# Patient Record
Sex: FEMALE | Race: WHITE | NOT HISPANIC OR LATINO | URBAN - METROPOLITAN AREA
[De-identification: names, ages, dates, MRNs, and addresses within clinical notes are randomized per-mention and may not be internally consistent; named-entity substitution may affect disease eponyms.]

---

## 2017-10-03 ENCOUNTER — APPOINTMENT (OUTPATIENT)
Dept: URBAN - METROPOLITAN AREA CLINIC 200 | Age: 57
Setting detail: DERMATOLOGY
End: 2017-10-04

## 2017-10-03 DIAGNOSIS — L28.0 LICHEN SIMPLEX CHRONICUS: ICD-10-CM

## 2017-10-03 PROCEDURE — OTHER COUNSELING: OTHER

## 2017-10-03 PROCEDURE — OTHER PRESCRIPTION: OTHER

## 2017-10-03 PROCEDURE — 99212 OFFICE O/P EST SF 10 MIN: CPT

## 2017-10-03 RX ORDER — BETAMETHASONE DIPROPIONATE 0.5 MG/G
CREAM TOPICAL
Qty: 1 | Refills: 4 | Status: ERX

## 2017-10-03 ASSESSMENT — LOCATION DETAILED DESCRIPTION DERM: LOCATION DETAILED: RIGHT DISTAL DORSAL FOREARM

## 2017-10-03 ASSESSMENT — LOCATION SIMPLE DESCRIPTION DERM: LOCATION SIMPLE: RIGHT FOREARM

## 2017-10-03 ASSESSMENT — LOCATION ZONE DERM: LOCATION ZONE: ARM

## 2019-03-24 LAB
ALBUMIN SERPL-MCNC: 4.4 G/DL (ref 3.5–5.5)
ALBUMIN/GLOB SERPL: 1.5 {RATIO} (ref 1.2–2.2)
ALP SERPL-CCNC: 76 IU/L (ref 39–117)
ALT SERPL-CCNC: 20 IU/L (ref 0–32)
AST SERPL-CCNC: 22 IU/L (ref 0–40)
BASOPHILS # BLD AUTO: 0 X10E3/UL (ref 0–0.2)
BASOPHILS NFR BLD AUTO: 0 %
BILIRUB SERPL-MCNC: 0.3 MG/DL (ref 0–1.2)
BUN SERPL-MCNC: 11 MG/DL (ref 6–24)
BUN/CREAT SERPL: 16 (ref 9–23)
CALCIUM SERPL-MCNC: 9.7 MG/DL (ref 8.7–10.2)
CHLORIDE SERPL-SCNC: 97 MMOL/L (ref 96–106)
CO2 SERPL-SCNC: 28 MMOL/L (ref 20–29)
CREAT SERPL-MCNC: 0.68 MG/DL (ref 0.57–1)
EOSINOPHIL # BLD AUTO: 0.3 X10E3/UL (ref 0–0.4)
EOSINOPHIL NFR BLD AUTO: 2 %
ERYTHROCYTE [DISTWIDTH] IN BLOOD BY AUTOMATED COUNT: 14.6 % (ref 12.3–15.4)
GLOBULIN SER CALC-MCNC: 3 G/DL (ref 1.5–4.5)
GLUCOSE SERPL-MCNC: 145 MG/DL (ref 65–99)
HCT VFR BLD AUTO: 39 % (ref 34–46.6)
HGB BLD-MCNC: 13.5 G/DL (ref 11.1–15.9)
IMM GRANULOCYTES # BLD AUTO: 0 X10E3/UL (ref 0–0.1)
IMM GRANULOCYTES NFR BLD AUTO: 0 %
LAB CORP EGFR IF AFRICN AM: 112 ML/MIN/1.73
LAB CORP EGFR IF NONAFRICN AM: 97 ML/MIN/1.73
LYMPHOCYTES # BLD AUTO: 2.6 X10E3/UL (ref 0.7–3.1)
LYMPHOCYTES NFR BLD AUTO: 22 %
MCH RBC QN AUTO: 27.9 PG (ref 26.6–33)
MCHC RBC AUTO-ENTMCNC: 34.6 G/DL (ref 31.5–35.7)
MCV RBC AUTO: 81 FL (ref 79–97)
MONOCYTES # BLD AUTO: 0.8 X10E3/UL (ref 0.1–0.9)
MONOCYTES NFR BLD AUTO: 7 %
NEUTROPHILS # BLD AUTO: 8 X10E3/UL (ref 1.4–7)
NEUTROPHILS NFR BLD AUTO: 69 %
PLATELET # BLD AUTO: 327 X10E3/UL (ref 150–379)
POTASSIUM SERPL-SCNC: 4.2 MMOL/L (ref 3.5–5.2)
PROT SERPL-MCNC: 7.4 G/DL (ref 6–8.5)
RBC # BLD AUTO: 4.84 X10E6/UL (ref 3.77–5.28)
SODIUM SERPL-SCNC: 140 MMOL/L (ref 134–144)
WBC # BLD AUTO: 11.6 X10E3/UL (ref 3.4–10.8)

## 2019-04-02 ENCOUNTER — APPOINTMENT (OUTPATIENT)
Dept: URBAN - METROPOLITAN AREA CLINIC 200 | Age: 59
Setting detail: DERMATOLOGY
End: 2019-04-09

## 2019-04-02 DIAGNOSIS — C88.8 OTHER MALIGNANT IMMUNOPROLIFERATIVE DISEASES: ICD-10-CM

## 2019-04-02 PROCEDURE — OTHER COUNSELING: OTHER

## 2019-04-02 PROCEDURE — 99212 OFFICE O/P EST SF 10 MIN: CPT

## 2019-04-02 PROCEDURE — OTHER MIPS QUALITY: OTHER

## 2019-04-02 ASSESSMENT — LOCATION DETAILED DESCRIPTION DERM
LOCATION DETAILED: RIGHT SUPERIOR FOREHEAD
LOCATION DETAILED: RIGHT SUPERIOR FOREHEAD

## 2019-04-02 ASSESSMENT — LOCATION SIMPLE DESCRIPTION DERM
LOCATION SIMPLE: RIGHT FOREHEAD
LOCATION SIMPLE: RIGHT FOREHEAD

## 2019-04-02 ASSESSMENT — LOCATION ZONE DERM
LOCATION ZONE: FACE
LOCATION ZONE: FACE

## 2019-04-02 NOTE — PROCEDURE: MIPS QUALITY
Quality 474: Zoster Vaccination Status: Shingrix Vaccination not Administered or Previously Received, Reason not Otherwise Specified
Detail Level: Detailed
Quality 110: Preventive Care And Screening: Influenza Immunization: Influenza Immunization not Administered because Patient Refused.

## 2019-04-02 NOTE — HPI: COUNSELING (TEST RESULT)
Additional History: Patient had a biopsy done at [facility]. Is here today for a second opinion.

## 2019-04-03 ENCOUNTER — OFFICE VISIT (OUTPATIENT)
Dept: PRIMARY CARE | Facility: CLINIC | Age: 59
End: 2019-04-03
Payer: COMMERCIAL

## 2019-04-03 VITALS
SYSTOLIC BLOOD PRESSURE: 130 MMHG | BODY MASS INDEX: 46.77 KG/M2 | DIASTOLIC BLOOD PRESSURE: 80 MMHG | OXYGEN SATURATION: 97 % | WEIGHT: 291 LBS | HEART RATE: 100 BPM | HEIGHT: 66 IN

## 2019-04-03 DIAGNOSIS — R73.01 IMPAIRED FASTING GLUCOSE: ICD-10-CM

## 2019-04-03 DIAGNOSIS — D72.820 LYMPHOCYTOSIS: ICD-10-CM

## 2019-04-03 DIAGNOSIS — E66.01 OBESITY, CLASS III, BMI 40-49.9 (MORBID OBESITY) (CMS/HCC): ICD-10-CM

## 2019-04-03 DIAGNOSIS — I10 ESSENTIAL HYPERTENSION, BENIGN: Primary | ICD-10-CM

## 2019-04-03 DIAGNOSIS — C85.80 MARGINAL ZONE LYMPHOMA (CMS/HCC): ICD-10-CM

## 2019-04-03 PROBLEM — E66.813 OBESITY, CLASS III, BMI 40-49.9 (MORBID OBESITY): Status: ACTIVE | Noted: 2019-04-03

## 2019-04-03 PROBLEM — K76.0 FATTY LIVER: Status: ACTIVE | Noted: 2019-04-03

## 2019-04-03 PROCEDURE — 99214 OFFICE O/P EST MOD 30 MIN: CPT | Performed by: NURSE PRACTITIONER

## 2019-04-03 RX ORDER — NORETHINDRONE 5 MG/1
TABLET ORAL
Refills: 1 | COMMUNITY
Start: 2019-01-22 | End: 2019-04-03 | Stop reason: ALTCHOICE

## 2019-04-03 RX ORDER — LORAZEPAM 0.5 MG/1
0.5 TABLET ORAL EVERY 6 HOURS PRN
Refills: 0 | COMMUNITY
Start: 2019-03-28 | End: 2020-09-04 | Stop reason: ALTCHOICE

## 2019-04-03 NOTE — PROGRESS NOTES
[facility]  Meghan Cordova  [address]  [place] [address]  Phone: [phone]  Fax: [phone]      Patient ID: Anh Rowan                              : 1960    Visit Date: 4/3/2019    Chief Complaint: Results         Patient ID: Anh Rowan is a 58 y.o. female.    Patient Active Problem List   Diagnosis   • Essential hypertension, benign   • Fatty liver   • Obesity, Class III, BMI 40-49.9 (morbid obesity) (CMS/HCC) (HCC)   • Marginal zone lymphoma (CMS/HCC) (HCC)   • Impaired fasting glucose   • Elevated WBC count         Current Outpatient Prescriptions:   •  LORazepam (ATIVAN) 0.5 mg tablet, Take 0.5 mg by mouth every 6 (six) hours as needed. for anxiety, Disp: , Rfl: 0    Allergies   Allergen Reactions   • Clarithromycin Rash       Social History     Social History   • Marital status:      Spouse name: N/A   • Number of children: N/A   • Years of education: N/A     Occupational History   • Not on file.     Social History Main Topics   • Smoking status: Never Smoker   • Smokeless tobacco: Never Used   • Alcohol use Not on file   • Drug use: Unknown   • Sexual activity: Not on file     Other Topics Concern   • Not on file     Social History Narrative   • No narrative on file       Health Maintenance   Topic Date Due   • Pneumococcal 19-64 Highest Risk (1 of 3 - PCV13) 1980   • Colonoscopy  2011   • Influenza Vaccine (Season Ended) 2019   • Mammogram  07/10/2020   • DTaP, Tdap, and Td Vaccines (2 - Td) 2021   • Cervical Cancer Screening  2023   • HPV Vaccines  Aged Out   • Meningococcal Vaccine  Aged Out   • HIB Vaccines  Aged Out   • IPV Vaccines  Aged Out   • Hepatitis C Screening  Completed       HPI  Here to go over some recent lab results she had with her specialists. New dx of impaired glucose. Also with recent dx of lymphoma on her scalp. She is undergoing workup with oncology and will  "needs more surgery from [facility] as well. She has a PET scan scheduled this week. She has been under a lot of stress with this new dx but is doing ok overall.  Denies recent weight loss, increased thirst, urinary frequency, change in vision.  States she does consume a lot of carbs in her diet. She does not exercise regularly.        The following have been reviewed and updated as appropriate in this visit:  Allergies  Meds  Problems         Review of System  Review of Systems   Constitutional: Negative for activity change, appetite change, chills, fatigue, fever and unexpected weight change.   Respiratory: Negative.    Cardiovascular: Negative.    Endocrine: Negative.        Objective     Vitals  Vitals:    04/03/19 1614   BP: 130/80   Pulse: 100   SpO2: 97%   Weight: 132 kg (291 lb)   Height: 1.67 m (5' 5.75\")     Body mass index is 47.33 kg/m².    Physical Exam  Physical Exam   Constitutional: She is oriented to person, place, and time. She appears well-developed and well-nourished. No distress.   Neck: Neck supple. No JVD present. No thyromegaly present.   Cardiovascular: Normal rate, regular rhythm and normal heart sounds.    No murmur heard.  Pulmonary/Chest: Effort normal and breath sounds normal. No respiratory distress. She has no wheezes. She has no rales.   Musculoskeletal: She exhibits no edema.   Lymphadenopathy:     She has no cervical adenopathy.   Neurological: She is alert and oriented to person, place, and time.   Skin: She is not diaphoretic.   Vitals reviewed.      Assessment/Plan     Problem List Items Addressed This Visit     Essential hypertension, benign - Primary     BP stable today  Continued monitoring suggested.  Weight loss will help.         Obesity, Class III, BMI 40-49.9 (morbid obesity) (CMS/HCC) (HCC)     BMI very elevated.  Encouraged pt to actively work on weight loss and try to get some regular exercise as well.         Marginal zone lymphoma (CMS/HCC) ([facility])     Recent dx--skin " lesion on her scalp.  Seeing Dr Salcedo.  PET scan scheduled for 4/6/2019.  Will need wide excision of area-- schedule with [facility] for early May.         Impaired fasting glucose     Check fasting glucose and A1C with next lab draw for oncology.  Also needs a fasting lipid panel.  Encouraged low sugar/carb diet  Avoid concentrated sweets.         Elevated WBC count     Recent WBC 11.6  Repeat CBC 1 mo  May have been due to stress.             Follow up 3 mo      HAYDER James  4/4/2019

## 2019-04-04 PROBLEM — D72.829 ELEVATED WBC COUNT: Status: ACTIVE | Noted: 2019-04-04

## 2019-04-04 PROBLEM — R73.01 IMPAIRED FASTING GLUCOSE: Status: ACTIVE | Noted: 2019-04-04

## 2019-04-04 ASSESSMENT — ENCOUNTER SYMPTOMS
FEVER: 0
APPETITE CHANGE: 0
CHILLS: 0
UNEXPECTED WEIGHT CHANGE: 0
FATIGUE: 0
CARDIOVASCULAR NEGATIVE: 1
ENDOCRINE NEGATIVE: 1
RESPIRATORY NEGATIVE: 1
ACTIVITY CHANGE: 0

## 2019-04-04 NOTE — ASSESSMENT & PLAN NOTE
BMI very elevated.  Encouraged pt to actively work on weight loss and try to get some regular exercise as well.

## 2019-04-04 NOTE — ASSESSMENT & PLAN NOTE
Check fasting glucose and A1C with next lab draw for oncology.  Also needs a fasting lipid panel.  Encouraged low sugar/carb diet  Avoid concentrated sweets.

## 2019-04-09 LAB
BASOPHILS # BLD AUTO: (no result) 10*3/UL
EOSINOPHIL # BLD AUTO: (no result) 10*3/UL
EOSINOPHIL NFR BLD AUTO: (no result) %
ESTRADIOL SERPL-MCNC: NORMAL PG/ML
FSH SERPL-ACNC: NORMAL MIU/ML
HCT VFR BLD AUTO: (no result) %
HGB BLD-MCNC: (no result) G/DL
LYMPHOCYTES # BLD AUTO: (no result) 10*3/UL
LYMPHOCYTES NFR BLD AUTO: (no result) %
MONOCYTES NFR BLD AUTO: (no result) %
NEUTROPHILS NFR BLD AUTO: (no result) %
PLATELET # BLD AUTO: (no result) 10*3/UL
RBC # BLD AUTO: (no result) 10*6/UL
SPECIMEN STATUS: NORMAL
TSH SERPL DL<=0.005 MIU/L-ACNC: NORMAL UIU/ML (ref 0.45–4.5)
WBC # BLD AUTO: (no result) X10E3/UL (ref 3.4–10.8)

## 2019-05-10 ENCOUNTER — APPOINTMENT (OUTPATIENT)
Dept: URBAN - METROPOLITAN AREA CLINIC 200 | Age: 59
Setting detail: DERMATOLOGY
End: 2019-05-13

## 2019-05-10 DIAGNOSIS — C85.8 OTHER SPECIFIED TYPES OF NON-HODGKIN LYMPHOMA: ICD-10-CM

## 2019-05-10 PROBLEM — C85.80 OTHER SPECIFIED TYPES OF NON-HODGKIN LYMPHOMA, UNSPECIFIED SITE: Status: ACTIVE | Noted: 2019-05-10

## 2019-05-10 PROCEDURE — OTHER BIOPSY BY PUNCH METHOD: OTHER

## 2019-05-10 PROCEDURE — 11104 PUNCH BX SKIN SINGLE LESION: CPT

## 2019-05-10 ASSESSMENT — LOCATION ZONE DERM: LOCATION ZONE: FACE

## 2019-05-10 ASSESSMENT — LOCATION SIMPLE DESCRIPTION DERM: LOCATION SIMPLE: RIGHT FOREHEAD

## 2019-05-10 ASSESSMENT — LOCATION DETAILED DESCRIPTION DERM: LOCATION DETAILED: RIGHT SUPERIOR FOREHEAD

## 2019-05-10 NOTE — PROCEDURE: BIOPSY BY PUNCH METHOD
Billing Type: Third-Party Bill
Hemostasis: None
X Size Of Lesion In Cm (Optional): 0
Anesthesia Type: 1% lidocaine without epinephrine
Biopsy Type: H and E
Anesthesia Volume In Cc (Will Not Render If 0): 1
Consent: Written consent was obtained and risks were reviewed including but not limited to scarring, infection, bleeding, scabbing, incomplete removal, nerve damage and allergy to anesthesia.
Punch Size In Mm: 6
Render In Bullet Format When Appropriate: No
Epidermal Sutures: 4-0 Nylon
Home Suture Removal Text: Patient was provided a home suture removal kit and will remove their sutures at home.  If they have any questions or difficulties they will call the office.
Detail Level: Detailed
Was A Bandage Applied: Yes
Post-Care Instructions: I reviewed with the patient in detail post-care instructions. Patient is to keep the biopsy site dry overnight, and then apply bacitracin twice daily until healed. Patient may apply hydrogen peroxide soaks to remove any crusting.
Dressing: Band-Aid
Number Of Epidermal Sutures (Optional): 2
Suture Removal: 14 days
Wound Care: Vaseline
Notification Instructions: Patient will be notified of biopsy results. However, patient instructed to call the office if not contacted within 2 weeks.

## 2019-05-23 ENCOUNTER — APPOINTMENT (OUTPATIENT)
Dept: URBAN - METROPOLITAN AREA CLINIC 200 | Age: 59
Setting detail: DERMATOLOGY
End: 2019-06-10

## 2019-05-23 DIAGNOSIS — C85.8 OTHER SPECIFIED TYPES OF NON-HODGKIN LYMPHOMA: ICD-10-CM

## 2019-05-23 PROBLEM — C85.80 OTHER SPECIFIED TYPES OF NON-HODGKIN LYMPHOMA, UNSPECIFIED SITE: Status: ACTIVE | Noted: 2019-05-23

## 2019-05-23 PROCEDURE — OTHER SUTURE REMOVAL (GLOBAL PERIOD): OTHER

## 2019-05-23 ASSESSMENT — LOCATION DETAILED DESCRIPTION DERM: LOCATION DETAILED: RIGHT SUPERIOR FOREHEAD

## 2019-05-23 ASSESSMENT — LOCATION SIMPLE DESCRIPTION DERM: LOCATION SIMPLE: RIGHT FOREHEAD

## 2019-05-23 ASSESSMENT — LOCATION ZONE DERM: LOCATION ZONE: FACE

## 2019-05-23 NOTE — PROCEDURE: SUTURE REMOVAL (GLOBAL PERIOD)
Add 28976 Cpt? (Important Note: In 2017 The Use Of 15459 Is Being Tracked By Cms To Determine Future Global Period Reimbursement For Global Periods): no
Detail Level: Detailed

## 2019-07-24 NOTE — ASSESSMENT & PLAN NOTE
BP stable today  Continued monitoring suggested.  Weight loss will help.   E-prescription confirmation received. Patient was informed via [facility].

## 2019-08-15 ENCOUNTER — TELEPHONE (OUTPATIENT)
Dept: PRIMARY CARE | Facility: CLINIC | Age: 59
End: 2019-08-15

## 2019-08-15 RX ORDER — HYDROCHLOROTHIAZIDE 25 MG/1
25 TABLET ORAL DAILY
Qty: 30 TABLET | Refills: 0 | Status: SHIPPED | OUTPATIENT
Start: 2019-08-15 | End: 2019-09-06 | Stop reason: SDUPTHER

## 2019-08-15 NOTE — TELEPHONE ENCOUNTER
She is asking if a lab slip can be sent to [facility]  , also she would like water pills call to [facility] [address]

## 2019-08-15 NOTE — TELEPHONE ENCOUNTER
Ok to give HCTZ 25mg QD #30. She had labs in March-- was she looking to get something specific done?

## 2019-09-06 ENCOUNTER — APPOINTMENT (OUTPATIENT)
Dept: URBAN - METROPOLITAN AREA CLINIC 200 | Age: 59
Setting detail: DERMATOLOGY
End: 2019-09-09

## 2019-09-06 DIAGNOSIS — C85.8 OTHER SPECIFIED TYPES OF NON-HODGKIN LYMPHOMA: ICD-10-CM

## 2019-09-06 DIAGNOSIS — D18.0 HEMANGIOMA: ICD-10-CM

## 2019-09-06 DIAGNOSIS — L71.8 OTHER ROSACEA: ICD-10-CM

## 2019-09-06 PROBLEM — C85.80 OTHER SPECIFIED TYPES OF NON-HODGKIN LYMPHOMA, UNSPECIFIED SITE: Status: ACTIVE | Noted: 2019-09-06

## 2019-09-06 PROBLEM — D18.01 HEMANGIOMA OF SKIN AND SUBCUTANEOUS TISSUE: Status: ACTIVE | Noted: 2019-09-06

## 2019-09-06 PROCEDURE — OTHER REASSURANCE: OTHER

## 2019-09-06 PROCEDURE — OTHER PRESCRIPTION MEDICATION MANAGEMENT: OTHER

## 2019-09-06 PROCEDURE — OTHER PRESCRIPTION: OTHER

## 2019-09-06 PROCEDURE — 99213 OFFICE O/P EST LOW 20 MIN: CPT

## 2019-09-06 RX ORDER — METRONIDAZOLE 7.5 MG/G
GEL TOPICAL
Qty: 1 | Refills: 6 | Status: ERX | COMMUNITY
Start: 2019-09-06

## 2019-09-06 RX ORDER — HYDROCHLOROTHIAZIDE 25 MG/1
TABLET ORAL
Qty: 30 TABLET | Refills: 0 | Status: SHIPPED | OUTPATIENT
Start: 2019-09-06 | End: 2019-09-10 | Stop reason: SDUPTHER

## 2019-09-06 ASSESSMENT — LOCATION DETAILED DESCRIPTION DERM
LOCATION DETAILED: RIGHT CLAVICULAR NECK
LOCATION DETAILED: RIGHT MEDIAL MALAR CHEEK
LOCATION DETAILED: LEFT MEDIAL MALAR CHEEK
LOCATION DETAILED: HAIR

## 2019-09-06 ASSESSMENT — LOCATION SIMPLE DESCRIPTION DERM
LOCATION SIMPLE: RIGHT CHEEK
LOCATION SIMPLE: RIGHT ANTERIOR NECK
LOCATION SIMPLE: HAIR
LOCATION SIMPLE: LEFT CHEEK

## 2019-09-06 ASSESSMENT — LOCATION ZONE DERM
LOCATION ZONE: FACE
LOCATION ZONE: SCALP
LOCATION ZONE: NECK

## 2019-09-06 ASSESSMENT — SEVERITY ASSESSMENT OVERALL AMONG ALL PATIENTS
IN YOUR EXPERIENCE, AMONG ALL PATIENTS YOU HAVE SEEN WITH THIS CONDITION, HOW SEVERE IS THIS PATIENT'S CONDITION?: MILD TO MODERATE

## 2019-09-06 NOTE — PROCEDURE: PRESCRIPTION MEDICATION MANAGEMENT
Initiate Treatment: metronidazole 0.75 % topical gel
Detail Level: Generalized
Render In Strict Bullet Format?: No

## 2019-09-06 NOTE — TELEPHONE ENCOUNTER
Medicine Refill Request    Last Office Visit: 4/3/2019  Next Office Visit: Visit date not found        Current Outpatient Prescriptions:   •  hydrochlorothiazide (HYDRODIURIL) 25 mg tablet, Take 1 tablet (25 mg total) by mouth daily., Disp: 30 tablet, Rfl: 0  •  LORazepam (ATIVAN) 0.5 mg tablet, Take 0.5 mg by mouth every 6 (six) hours as needed. for anxiety, Disp: , Rfl: 0      BP Readings from Last 3 Encounters:   04/03/19 130/80       Recent Lab results:  No results found for: CHOL, No results found for: HDL, No results found for: LDLCALC, No results found for: TRIG     Lab Results   Component Value Date    GLUCOSE 145 (H) 03/23/2019   , No results found for: HGBA1C      Lab Results   Component Value Date    CREATININE 0.68 03/23/2019       Lab Results   Component Value Date    TSH Comment 03/23/2019

## 2019-09-10 RX ORDER — HYDROCHLOROTHIAZIDE 25 MG/1
25 TABLET ORAL
Qty: 90 TABLET | Refills: 0 | Status: SHIPPED | OUTPATIENT
Start: 2019-09-10 | End: 2020-09-04 | Stop reason: ALTCHOICE

## 2019-09-10 NOTE — TELEPHONE ENCOUNTER
Medicine Refill Request    Last Office Visit: 4/3/2019  Next Office Visit: Visit date not found    Pharm called-insurance will only accept #[phone]-repended to you    Current Outpatient Prescriptions:   •  hydrochlorothiazide (HYDRODIURIL) 25 mg tablet, TAKE 1 TABLET BY MOUTH EVERY DAY, Disp: 30 tablet, Rfl: 0  •  LORazepam (ATIVAN) 0.5 mg tablet, Take 0.5 mg by mouth every 6 (six) hours as needed. for anxiety, Disp: , Rfl: 0      BP Readings from Last 3 Encounters:   04/03/19 130/80       Recent Lab results:  No results found for: CHOL, No results found for: HDL, No results found for: LDLCALC, No results found for: TRIG     Lab Results   Component Value Date    GLUCOSE 145 (H) 03/23/2019   , No results found for: HGBA1C      Lab Results   Component Value Date    CREATININE 0.68 03/23/2019       Lab Results   Component Value Date    TSH Comment 03/23/2019

## 2020-03-09 ENCOUNTER — APPOINTMENT (OUTPATIENT)
Dept: URBAN - METROPOLITAN AREA CLINIC 200 | Age: 60
Setting detail: DERMATOLOGY
End: 2020-03-12

## 2020-03-09 DIAGNOSIS — L71.8 OTHER ROSACEA: ICD-10-CM

## 2020-03-09 DIAGNOSIS — L20.84 INTRINSIC (ALLERGIC) ECZEMA: ICD-10-CM

## 2020-03-09 DIAGNOSIS — C85.8 OTHER SPECIFIED TYPES OF NON-HODGKIN LYMPHOMA: ICD-10-CM

## 2020-03-09 PROBLEM — C85.80 OTHER SPECIFIED TYPES OF NON-HODGKIN LYMPHOMA, UNSPECIFIED SITE: Status: ACTIVE | Noted: 2020-03-09

## 2020-03-09 PROCEDURE — OTHER PRESCRIPTION MEDICATION MANAGEMENT: OTHER

## 2020-03-09 PROCEDURE — 99213 OFFICE O/P EST LOW 20 MIN: CPT

## 2020-03-09 PROCEDURE — OTHER PRESCRIPTION: OTHER

## 2020-03-09 RX ORDER — IVERMECTIN 10 MG/G
CREAM TOPICAL
Qty: 1 | Refills: 6 | Status: ERX | COMMUNITY
Start: 2020-03-09

## 2020-03-09 RX ORDER — TRIAMCINOLONE ACETONIDE 1 MG/G
CREAM TOPICAL
Qty: 1 | Refills: 6 | Status: ERX | COMMUNITY
Start: 2020-03-09

## 2020-03-09 ASSESSMENT — LOCATION SIMPLE DESCRIPTION DERM
LOCATION SIMPLE: RIGHT CHEEK
LOCATION SIMPLE: HAIR
LOCATION SIMPLE: RIGHT PRETIBIAL REGION
LOCATION SIMPLE: LEFT PRETIBIAL REGION
LOCATION SIMPLE: LEFT CHEEK

## 2020-03-09 ASSESSMENT — LOCATION DETAILED DESCRIPTION DERM
LOCATION DETAILED: HAIR
LOCATION DETAILED: LEFT DISTAL PRETIBIAL REGION
LOCATION DETAILED: RIGHT DISTAL PRETIBIAL REGION
LOCATION DETAILED: LEFT INFERIOR MEDIAL MALAR CHEEK
LOCATION DETAILED: RIGHT INFERIOR CENTRAL MALAR CHEEK

## 2020-03-09 ASSESSMENT — LOCATION ZONE DERM
LOCATION ZONE: SCALP
LOCATION ZONE: FACE
LOCATION ZONE: LEG

## 2020-03-09 NOTE — PROCEDURE: PRESCRIPTION MEDICATION MANAGEMENT
Initiate Treatment: Soolantra 1 % topical cream
Initiate Treatment: triamcinolone acetonide 0.1 % topical cream
Render In Strict Bullet Format?: No
Samples Given: Cerve anti itch moisturizer
Detail Level: Detailed

## 2020-08-22 ENCOUNTER — TELEPHONE (OUTPATIENT)
Dept: PRIMARY CARE | Facility: CLINIC | Age: 60
End: 2020-08-22

## 2020-08-22 NOTE — TELEPHONE ENCOUNTER
Pt phoned the answering service requesting that a water pill be called in today for her.  Ast the pt was last seen in April 2019 by [facility] and the most recent diuretic script was issued 9/2019, the pt was not phoned.  The answering service replied to her that this examiner is on call for emergencies. If she cannot wait until 8/24/2020 to call the office for her script, she should go to [facility] urgent care to be seen.

## 2020-08-24 NOTE — TELEPHONE ENCOUNTER
Please call pt to schedule her for appt with Meghan. Telemed ok. If she has questions, she can talk to me, but she can not get a script without an appt.

## 2020-09-03 ENCOUNTER — TELEPHONE (OUTPATIENT)
Dept: PRIMARY CARE | Facility: CLINIC | Age: 60
End: 2020-09-03

## 2020-09-03 NOTE — TELEPHONE ENCOUNTER
Patient went to urgent care for rash.  They did lab work on her and patient said they told her she should see her PCP.  Patient has high sugar 305 and liver is high.  Patient also has a rash on stomach, legs and forearm.  The rash is not itchy and patient does not have a fever.  Patient would like to be seen in the office by Meghan, but there are no openings tomorrow and patient could not do today until after 3:15.  Please advise

## 2020-09-03 NOTE — TELEPHONE ENCOUNTER
Pt called back. She will call tomorrow to see if she can get an appt. Offer 5:30 today with Dr Aguilera but she declined

## 2020-09-04 ENCOUNTER — OFFICE VISIT (OUTPATIENT)
Dept: PRIMARY CARE | Facility: CLINIC | Age: 60
End: 2020-09-04
Payer: COMMERCIAL

## 2020-09-04 VITALS
SYSTOLIC BLOOD PRESSURE: 140 MMHG | BODY MASS INDEX: 47.09 KG/M2 | DIASTOLIC BLOOD PRESSURE: 88 MMHG | WEIGHT: 293 LBS | TEMPERATURE: 98.6 F | OXYGEN SATURATION: 98 % | HEIGHT: 66 IN | HEART RATE: 68 BPM

## 2020-09-04 DIAGNOSIS — R73.01 IMPAIRED FASTING GLUCOSE: ICD-10-CM

## 2020-09-04 DIAGNOSIS — R23.3 PETECHIAL RASH: Primary | ICD-10-CM

## 2020-09-04 DIAGNOSIS — C85.80 MARGINAL ZONE LYMPHOMA (CMS/HCC): ICD-10-CM

## 2020-09-04 DIAGNOSIS — E66.01 OBESITY, CLASS III, BMI 40-49.9 (MORBID OBESITY) (CMS/HCC): ICD-10-CM

## 2020-09-04 DIAGNOSIS — R79.89 ABNORMAL LFTS: ICD-10-CM

## 2020-09-04 PROCEDURE — 99214 OFFICE O/P EST MOD 30 MIN: CPT | Performed by: NURSE PRACTITIONER

## 2020-09-04 RX ORDER — FUROSEMIDE 20 MG/1
20 TABLET ORAL
COMMUNITY
Start: 2020-08-22 | End: 2020-09-04 | Stop reason: ALTCHOICE

## 2020-09-04 ASSESSMENT — ENCOUNTER SYMPTOMS
FATIGUE: 0
SHORTNESS OF BREATH: 0
FEVER: 0

## 2020-09-04 NOTE — ASSESSMENT & PLAN NOTE
Check A1C  Pt aware with glucose in 300s that she has DM and we need to see where she is at to start treatment.  She will switch to diabetic plan at [facility].  May need telemed visit to discuss treatment plan.

## 2020-09-04 NOTE — PROGRESS NOTES
[facility]  Meghan Cordova  [address]  [place] [address]  Phone: [phone]  Fax: [phone]      Patient ID: Anh Rowan                              : 1960    Visit Date: 2020    Chief Complaint: Rash (treated and doctors express they did labs and sugar was high )         Patient ID: Anh Rowan is a 60 y.o. female.    Patient Active Problem List   Diagnosis   • Essential hypertension, benign   • Fatty liver   • Obesity, Class III, BMI 40-49.9 (morbid obesity) (CMS/HCC)   • Marginal zone lymphoma (CMS/HCC)   • Impaired fasting glucose   • Elevated WBC count   • Abnormal LFTs   • Petechial rash       No current outpatient medications on file.    Allergies   Allergen Reactions   • Clarithromycin Rash       Social History     Tobacco Use   • Smoking status: Never Smoker   • Smokeless tobacco: Never Used   Substance Use Topics   • Alcohol use: Not on file   • Drug use: Not on file       Health Maintenance   Topic Date Due   • Pneumococcal (1 of 3 - PCV13) 1967   • HIV Screening  1974   • Zoster Vaccine (1 of 2) 2011   • Colonoscopy  2011   • Mammogram  07/10/2020   • Influenza Vaccine (1) 2021 (Originally 2020)   • Varicella Vaccines (1 of 2 - 2-dose childhood series) 2031 (Originally 1961)   • DTaP, Tdap, and Td Vaccines (2 - Td) 2021   • Cervical Cancer Screening  2023   • Hepatitis C Screening  Completed   • Meningococcal ACWY  Aged Out   • HIB Vaccines  Aged Out   • IPV Vaccines  Aged Out   • HPV Vaccines  Aged Out       HPI  Rash on abdomen, arms and upper thighs.  It is slowly fading.  No symptoms-- no pain, itching, irritation.  Seen at   Labs done.  Glucose high @ 300  LFTs mildly elevated.    Rash   This is a new problem. The current episode started in the past 7 days. The problem has been gradually improving since onset. The affected locations include the abdomen,  "left arm, right arm, right upper leg and left upper leg. The rash is characterized by redness. She was exposed to nothing. Pertinent negatives include no fatigue, fever, joint pain or shortness of breath. Past treatments include nothing.       The following have been reviewed and updated as appropriate in this visit:         Review of System  Review of Systems   Constitutional: Negative for fatigue and fever.   Respiratory: Negative for shortness of breath.    Musculoskeletal: Negative for joint pain.   Skin: Positive for rash.       Objective     Vitals  Vitals:    09/04/20 1129   BP: 140/88   BP Location: Left forearm   Patient Position: Sitting   Pulse: 68   Temp: 37 °C (98.6 °F)   SpO2: 98%   Weight: 134 kg (296 lb)   Height: 1.67 m (5' 5.75\")     Body mass index is 48.14 kg/m².    Physical Exam  Physical Exam   Constitutional: She is oriented to person, place, and time. She appears well-developed and well-nourished. No distress.   Cardiovascular: Normal rate, regular rhythm and normal heart sounds. Exam reveals no gallop and no friction rub.   No murmur heard.  Pulmonary/Chest: Effort normal and breath sounds normal. No respiratory distress. She has no wheezes. She has no rales.   Neurological: She is alert and oriented to person, place, and time.   Skin: Rash noted. She is not diaphoretic.   Petechial rash on lower abdomen diffuse. Fading petechial rash both inner forearms and upper thighs.  Non tender. No discharge.   Vitals reviewed.      Assessment/Plan     Problem List Items Addressed This Visit     Obesity, Class III, BMI 40-49.9 (morbid obesity) (CMS/[facility])     Currently doing weight loss program with [facility].         Marginal zone lymphoma (CMS/[facility])     Dr Salcedo following.         Impaired fasting glucose     Check A1C  Pt aware with glucose in 300s that she has DM and we need to see where she is at to start treatment.  She will switch to diabetic plan at [facility].  May need telemed visit to " discuss treatment plan.         Relevant Orders    Hemoglobin A1c    Abnormal LFTs     Fatty liver hx  GI consult  Weight loss--pt is currently doing [facility].         Petechial rash - Primary     Unsure of cause.  Seems to be fading on its own  Monitor for now.  Doubt liver related-- LFTs only mildly elevated and improved from prior #s.                   HAYDER James  9/4/2020

## 2020-09-04 NOTE — ASSESSMENT & PLAN NOTE
Unsure of cause.  Seems to be fading on its own  Monitor for now.  Doubt liver related-- LFTs only mildly elevated and improved from prior #s.

## 2020-09-05 LAB — HBA1C MFR BLD: 11.3 % (ref 4.8–5.6)

## 2020-09-09 ENCOUNTER — TELEPHONE (OUTPATIENT)
Dept: PRIMARY CARE | Facility: CLINIC | Age: 60
End: 2020-09-09

## 2020-09-09 ENCOUNTER — APPOINTMENT (OUTPATIENT)
Dept: URBAN - METROPOLITAN AREA CLINIC 200 | Age: 60
Setting detail: DERMATOLOGY
End: 2020-09-29

## 2020-09-09 DIAGNOSIS — Z41.9 ENCOUNTER FOR PROCEDURE FOR PURPOSES OTHER THAN REMEDYING HEALTH STATE, UNSPECIFIED: ICD-10-CM

## 2020-09-09 DIAGNOSIS — L67.8 OTHER HAIR COLOR AND HAIR SHAFT ABNORMALITIES: ICD-10-CM

## 2020-09-09 DIAGNOSIS — C85.8 OTHER SPECIFIED TYPES OF NON-HODGKIN LYMPHOMA: ICD-10-CM

## 2020-09-09 PROBLEM — C85.80 OTHER SPECIFIED TYPES OF NON-HODGKIN LYMPHOMA, UNSPECIFIED SITE: Status: ACTIVE | Noted: 2020-09-09

## 2020-09-09 PROCEDURE — 99212 OFFICE O/P EST SF 10 MIN: CPT | Mod: 25

## 2020-09-09 PROCEDURE — OTHER COUNSELING: OTHER

## 2020-09-09 PROCEDURE — 11102 TANGNTL BX SKIN SINGLE LES: CPT

## 2020-09-09 PROCEDURE — OTHER CAUTERY: OTHER

## 2020-09-09 PROCEDURE — OTHER BIOPSY BY SHAVE METHOD: OTHER

## 2020-09-09 ASSESSMENT — LOCATION SIMPLE DESCRIPTION DERM
LOCATION SIMPLE: RIGHT ANTERIOR NECK
LOCATION SIMPLE: RIGHT FOREHEAD
LOCATION SIMPLE: CHEST
LOCATION SIMPLE: POSTERIOR SCALP
LOCATION SIMPLE: HAIR
LOCATION SIMPLE: LEFT AXILLARY VAULT

## 2020-09-09 ASSESSMENT — LOCATION DETAILED DESCRIPTION DERM
LOCATION DETAILED: RIGHT INFERIOR ANTERIOR NECK
LOCATION DETAILED: RIGHT SUPERIOR OCCIPITAL SCALP
LOCATION DETAILED: UPPER STERNUM
LOCATION DETAILED: RIGHT SUPERIOR FOREHEAD
LOCATION DETAILED: LEFT AXILLARY VAULT
LOCATION DETAILED: HAIR

## 2020-09-09 ASSESSMENT — LOCATION ZONE DERM
LOCATION ZONE: SCALP
LOCATION ZONE: FACE
LOCATION ZONE: TRUNK
LOCATION ZONE: NECK
LOCATION ZONE: AXILLAE

## 2020-09-09 NOTE — PROCEDURE: COUNSELING
Detail Level: Detailed
Patient Specific Counseling (Will Not Stick From Patient To Patient): No evidence of recurrence

## 2020-09-09 NOTE — TELEPHONE ENCOUNTER
I asked the nurses to call her. She needs to come back in and go over. A1C high--new dx of DM. I can do telemed or she can come in.

## 2020-09-09 NOTE — PROCEDURE: BIOPSY BY SHAVE METHOD
Consent: Written consent was obtained and risks were reviewed including but not limited to scarring, infection, bleeding, scabbing, incomplete removal, nerve damage and allergy to anesthesia.
Validate That Anesthesia Is Not 0: No
Dressing: bandage
Biopsy Type: H and E
Detail Level: Detailed
Additional Anesthesia Volume In Cc (Will Not Render If 0): 0
Electrodesiccation And Curettage Text: The wound bed was treated with electrodesiccation and curettage after the biopsy was performed.
Electrodesiccation Text: The wound bed was treated with electrodesiccation after the biopsy was performed.
Was A Bandage Applied: Yes
Biopsy Method: sterile single edge surgical blade
Hemostasis: Drysol
Anesthesia Volume In Cc (Will Not Render If 0): 0.5
Billing Type: Third-Party Bill
Type Of Destruction Used: Curettage
Cryotherapy Text: The wound bed was treated with cryotherapy after the biopsy was performed.
Curettage Text: The wound bed was treated with curettage after the biopsy was performed.
Silver Nitrate Text: The wound bed was treated with silver nitrate after the biopsy was performed.
Wound Care: Aquaphor
Information: Selecting Yes will display possible errors in your note based on the variables you have selected. This validation is only offered as a suggestion for you. PLEASE NOTE THAT THE VALIDATION TEXT WILL BE REMOVED WHEN YOU FINALIZE YOUR NOTE. IF YOU WANT TO FAX A PRELIMINARY NOTE YOU WILL NEED TO TOGGLE THIS TO 'NO' IF YOU DO NOT WANT IT IN YOUR FAXED NOTE.
Notification Instructions: Patient will be notified of biopsy results. However, patient instructed to call the office if not contacted within 2 weeks.
Post-Care Instructions: I reviewed with the patient in detail post-care instructions. Patient is to keep the biopsy site dry overnight, and then apply bacitracin twice daily until healed. Patient may apply hydrogen peroxide soaks to remove any crusting.
Depth Of Biopsy: dermis

## 2020-09-10 ENCOUNTER — TELEPHONE (OUTPATIENT)
Dept: FAMILY MEDICINE | Facility: CLINIC | Age: 60
End: 2020-09-10

## 2020-09-10 NOTE — TELEPHONE ENCOUNTER
----- Message from HAYDER James sent at 9/8/2020  6:39 AM EDT -----  Pt's A1c is 11.3. Uncontrolled DM--new diagnosis. Please have her schedule appt here this week to discuss.

## 2020-09-11 ENCOUNTER — OFFICE VISIT (OUTPATIENT)
Dept: PRIMARY CARE | Facility: CLINIC | Age: 60
End: 2020-09-11
Payer: COMMERCIAL

## 2020-09-11 VITALS
TEMPERATURE: 98.6 F | SYSTOLIC BLOOD PRESSURE: 120 MMHG | DIASTOLIC BLOOD PRESSURE: 80 MMHG | OXYGEN SATURATION: 98 % | HEART RATE: 64 BPM | WEIGHT: 293 LBS | BODY MASS INDEX: 47.09 KG/M2 | HEIGHT: 66 IN

## 2020-09-11 DIAGNOSIS — E66.01 OBESITY, CLASS III, BMI 40-49.9 (MORBID OBESITY) (CMS/HCC): ICD-10-CM

## 2020-09-11 DIAGNOSIS — E11.65 TYPE 2 DIABETES MELLITUS WITH HYPERGLYCEMIA, WITHOUT LONG-TERM CURRENT USE OF INSULIN (CMS/HCC): Primary | ICD-10-CM

## 2020-09-11 DIAGNOSIS — C85.80 MARGINAL ZONE LYMPHOMA (CMS/HCC): ICD-10-CM

## 2020-09-11 PROBLEM — R73.01 IMPAIRED FASTING GLUCOSE: Status: RESOLVED | Noted: 2019-04-04 | Resolved: 2020-09-11

## 2020-09-11 PROCEDURE — 99214 OFFICE O/P EST MOD 30 MIN: CPT | Performed by: NURSE PRACTITIONER

## 2020-09-11 RX ORDER — METFORMIN HYDROCHLORIDE 500 MG/1
500 TABLET, EXTENDED RELEASE ORAL
Qty: 30 TABLET | Refills: 0 | Status: SHIPPED | OUTPATIENT
Start: 2020-09-11 | End: 2020-10-06

## 2020-09-11 RX ORDER — BLOOD-GLUCOSE METER
1 KIT MISCELLANEOUS 2 TIMES DAILY
Qty: 100 STRIP | Refills: 1 | Status: SHIPPED | OUTPATIENT
Start: 2020-09-11 | End: 2020-10-16 | Stop reason: ALTCHOICE

## 2020-09-11 RX ORDER — INSULIN PUMP SYRINGE, 3 ML
EACH MISCELLANEOUS
Qty: 1 EACH | Refills: 0 | Status: SHIPPED | OUTPATIENT
Start: 2020-09-11 | End: 2020-10-16 | Stop reason: ALTCHOICE

## 2020-09-11 NOTE — PATIENT INSTRUCTIONS
Check fingerstick blood sugar twice a day before breakfast and before dinner.  Record and send through the portal weekly.  Start Metformin daily in AM  May cause diarrhea.  Low carb diet( continue [facility])  Exercise 30 minutes 5 days a week.  Follow up blood test 3-4 weeks then follow up.

## 2020-09-11 NOTE — PROGRESS NOTES
[facility]  Meghan Cordova  [address]  [place] [address]  Phone: [phone]  Fax: [phone]      Patient ID: Anh Rowan                              : 1960    Visit Date: 2020    Chief Complaint: Results         Patient ID: Anh Rowan is a 60 y.o. female.    Patient Active Problem List   Diagnosis   • Essential hypertension, benign   • Fatty liver   • Obesity, Class III, BMI 40-49.9 (morbid obesity) (CMS/HCC)   • Marginal zone lymphoma (CMS/HCC)   • Elevated WBC count   • Abnormal LFTs   • Petechial rash   • Type 2 diabetes mellitus with hyperglycemia, without long-term current use of insulin (CMS/HCC)         Current Outpatient Medications:   •  blood-glucose meter (FREESTYLE LITE METER) kit, Use as instructed, Disp: 1 each, Rfl: 0  •  FREESTYLE LITE STRIPS strip, 1 strip 2 (two) times a day., Disp: 100 strip, Rfl: 1  •  metFORMIN XR (GLUCOPHAGE XR) 500 mg 24 hr tablet, Take 1 tablet (500 mg total) by mouth daily with breakfast., Disp: 30 tablet, Rfl: 0    Allergies   Allergen Reactions   • Clarithromycin Rash       Social History     Tobacco Use   • Smoking status: Never Smoker   • Smokeless tobacco: Never Used   Substance Use Topics   • Alcohol use: Not on file   • Drug use: Not on file       Health Maintenance   Topic Date Due   • Pneumococcal (1 of 3 - PCV13) 1967   • Annual Dilated Retinal Exam  1971   • Diabetic Foot Exam  1971   • Urine Protein Screening  1971   • HIV Screening  1974   • Zoster Vaccine (1 of 2) 2011   • Colonoscopy  2011   • Mammogram  07/10/2020   • Influenza Vaccine (1) 2021 (Originally 2020)   • Varicella Vaccines (1 of 2 - 2-dose childhood series) 2031 (Originally 1961)   • DTaP, Tdap, and Td Vaccines (2 - Td) 2021   • Hemoglobin A1C  2021   • Cervical Cancer Screening  2023   • Hepatitis C Screening  Completed   •  "Meningococcal ACWY  Aged Out   • HIB Vaccines  Aged Out   • IPV Vaccines  Aged Out   • HPV Vaccines  Aged Out       HPI  Here to discuss A1C results.    Diabetes   She presents for her initial diabetic visit. She has type 2 diabetes mellitus. Her disease course has been worsening. There are no hypoglycemic associated symptoms. There are no diabetic associated symptoms. When asked about current treatments, none were reported. Her weight is decreasing steadily (on [facility]). She rarely participates in exercise. An ACE inhibitor/angiotensin II receptor blocker is not being taken. She does not see a podiatrist.Eye exam is not current.       The following have been reviewed and updated as appropriate in this visit:  Allergies  Meds  Problems         Review of System  Review of Systems    Objective     Vitals  Vitals:    09/11/20 1522   BP: 120/80   BP Location: Left upper arm   Patient Position: Sitting   Pulse: 64   Temp: 37 °C (98.6 °F)   SpO2: 98%   Weight: 134 kg (296 lb)   Height: 1.67 m (5' 5.75\")     Body mass index is 48.14 kg/m².    Physical Exam  Physical Exam   Constitutional: She is oriented to person, place, and time. She appears well-developed and well-nourished. No distress.   Neck: Neck supple. No JVD present. No thyromegaly present.   Cardiovascular: Normal rate, regular rhythm and normal heart sounds. Exam reveals no gallop and no friction rub.   No murmur heard.  Pulmonary/Chest: Effort normal and breath sounds normal. No respiratory distress. She has no wheezes. She has no rales.   Lymphadenopathy:     She has no cervical adenopathy.   Neurological: She is alert and oriented to person, place, and time.   Skin: Rash noted. She is not diaphoretic.   Arms and upper thighs pinpoint erythematous rash--unchanged   Vitals reviewed.    A1C 11.3  Assessment/Plan     Problem List Items Addressed This Visit     Obesity, Class III, BMI 40-49.9 (morbid obesity) (CMS/[facility])     On [facility] currently  Has " lost 7 lbs so far.  Need to keep diet going.  Also needs a regular exercise program 5 days a week for 30 minutes.         Marginal zone lymphoma (CMS/HCC)     Oncology following  Stable.         Type 2 diabetes mellitus with hyperglycemia, without long-term current use of insulin (CMS/HCC) - Primary     Check fingerstick blood sugar twice a day before breakfast and before dinner.  Record and send through the portal weekly.  Start Metformin daily in AM  May cause diarrhea.  Low carb diet( continue [employer])  Exercise 30 minutes 5 days a week.  Follow up blood test 3-4 weeks then follow up.         Relevant Medications    metFORMIN XR (GLUCOPHAGE XR) 500 mg 24 hr tablet    blood-glucose meter (FREESTYLE LITE METER) kit    FREESTYLE LITE STRIPS strip    Other Relevant Orders    Basic metabolic panel        Over 50% of 25 minute visit spent in counseling and education today        HAYDER James  9/12/2020

## 2020-09-12 DIAGNOSIS — E11.65 TYPE 2 DIABETES MELLITUS WITH HYPERGLYCEMIA, WITHOUT LONG-TERM CURRENT USE OF INSULIN (CMS/HCC): Primary | ICD-10-CM

## 2020-09-12 DIAGNOSIS — E11.65 UNCONTROLLED TYPE 2 DIABETES MELLITUS WITH HYPERGLYCEMIA (CMS/HCC): Primary | ICD-10-CM

## 2020-09-12 RX ORDER — DEXTROSE 4 G
1 TABLET,CHEWABLE ORAL 2 TIMES DAILY
Qty: 1 EACH | Refills: 0 | Status: SHIPPED | OUTPATIENT
Start: 2020-09-12 | End: 2020-12-09 | Stop reason: ALTCHOICE

## 2020-09-12 RX ORDER — INSULIN PUMP SYRINGE, 3 ML
EACH MISCELLANEOUS
Qty: 1 EACH | Refills: 0 | Status: CANCELLED | OUTPATIENT
Start: 2020-09-12 | End: 2021-09-12

## 2020-09-12 ASSESSMENT — ENCOUNTER SYMPTOMS: DIABETIC ASSOCIATED SYMPTOMS: 0

## 2020-09-12 NOTE — ASSESSMENT & PLAN NOTE
On [facility] currently  Has lost 7 lbs so far.  Need to keep diet going.  Also needs a regular exercise program 5 days a week for 30 minutes.

## 2020-09-14 DIAGNOSIS — E11.65 TYPE 2 DIABETES MELLITUS WITH HYPERGLYCEMIA, WITHOUT LONG-TERM CURRENT USE OF INSULIN (CMS/HCC): Primary | ICD-10-CM

## 2020-09-15 RX ORDER — DEXTROSE 4 G
TABLET,CHEWABLE ORAL
Qty: 1 EACH | Refills: 0 | Status: SHIPPED | OUTPATIENT
Start: 2020-09-15 | End: 2025-03-18 | Stop reason: SDUPTHER

## 2020-09-15 RX ORDER — LANCETS
1 EACH MISCELLANEOUS 2 TIMES DAILY
Qty: 100 EACH | Refills: 1 | Status: SHIPPED | OUTPATIENT
Start: 2020-09-15 | End: 2020-10-21 | Stop reason: SDUPTHER

## 2020-09-25 ENCOUNTER — APPOINTMENT (OUTPATIENT)
Dept: URBAN - METROPOLITAN AREA CLINIC 200 | Age: 60
Setting detail: DERMATOLOGY
End: 2020-10-02

## 2020-09-25 DIAGNOSIS — M31.0 HYPERSENSITIVITY ANGIITIS: ICD-10-CM

## 2020-09-25 PROCEDURE — 11105 PUNCH BX SKIN EA SEP/ADDL: CPT

## 2020-09-25 PROCEDURE — OTHER BIOPSY BY PUNCH METHOD: OTHER

## 2020-09-25 PROCEDURE — 11104 PUNCH BX SKIN SINGLE LESION: CPT

## 2020-09-25 PROCEDURE — OTHER ORDER TESTS: OTHER

## 2020-09-25 ASSESSMENT — LOCATION SIMPLE DESCRIPTION DERM: LOCATION SIMPLE: RIGHT FOREARM

## 2020-09-25 ASSESSMENT — LOCATION ZONE DERM: LOCATION ZONE: ARM

## 2020-09-25 ASSESSMENT — LOCATION DETAILED DESCRIPTION DERM: LOCATION DETAILED: RIGHT VENTRAL PROXIMAL FOREARM

## 2020-09-25 NOTE — PROCEDURE: BIOPSY BY PUNCH METHOD
X Size Of Lesion In Cm (Optional): 0
Anesthesia Type: 1% lidocaine without epinephrine
Validate Anticipated Plan: No
Validate Note Data (See Information Below): Yes
Billing Type: Third-Party Bill
Consent: Written consent was obtained and risks were reviewed including but not limited to scarring, infection, bleeding, scabbing, incomplete removal, nerve damage and allergy to anesthesia.
Wound Care: Aquaphor
Punch Size In Mm: 4
Suture Removal: 14 days
Dressing: pressure dressing
Notification Instructions: Patient will be notified of biopsy results. However, patient instructed to call the office if not contacted within 2 weeks.
Post-Care Instructions: I reviewed with the patient in detail post-care instructions. Patient is to keep the biopsy site dry overnight, and then apply bacitracin twice daily until healed. Patient may apply hydrogen peroxide soaks to remove any crusting.
Epidermal Sutures: 4-0 Nylon
Biopsy Type: DIF
Biopsy Type: H and E
Home Suture Removal Text: Patient was provided a home suture removal kit and will remove their sutures at home.  If they have any questions or difficulties they will call the office.
Information: Selecting Yes will display possible errors in your note based on the variables you have selected. This validation is only offered as a suggestion for you. PLEASE NOTE THAT THE VALIDATION TEXT WILL BE REMOVED WHEN YOU FINALIZE YOUR NOTE. IF YOU WANT TO FAX A PRELIMINARY NOTE YOU WILL NEED TO TOGGLE THIS TO 'NO' IF YOU DO NOT WANT IT IN YOUR FAXED NOTE.
Detail Level: Detailed
Anesthesia Volume In Cc (Will Not Render If 0): 1
Hemostasis: None

## 2020-10-03 DIAGNOSIS — E11.65 TYPE 2 DIABETES MELLITUS WITH HYPERGLYCEMIA, WITHOUT LONG-TERM CURRENT USE OF INSULIN (CMS/HCC): ICD-10-CM

## 2020-10-04 LAB
BUN SERPL-MCNC: 10 MG/DL (ref 6–24)
BUN/CREAT SERPL: 12 (ref 9–23)
CALCIUM SERPL-MCNC: 10.1 MG/DL (ref 8.7–10.2)
CHLORIDE SERPL-SCNC: 100 MMOL/L (ref 96–106)
CO2 SERPL-SCNC: 24 MMOL/L (ref 20–29)
CREAT SERPL-MCNC: 0.84 MG/DL (ref 0.57–1)
GLUCOSE SERPL-MCNC: 204 MG/DL (ref 65–99)
LAB CORP EGFR IF AFRICN AM: 88 ML/MIN/1.73
LAB CORP EGFR IF NONAFRICN AM: 76 ML/MIN/1.73
POTASSIUM SERPL-SCNC: 5.1 MMOL/L (ref 3.5–5.2)
SODIUM SERPL-SCNC: 144 MMOL/L (ref 134–144)

## 2020-10-05 ENCOUNTER — RX ONLY (RX ONLY)
Age: 60
End: 2020-10-05

## 2020-10-05 RX ORDER — PREDNISONE 10 MG/1
TABLET ORAL
Qty: 30 | Refills: 0 | Status: ERX | COMMUNITY
Start: 2020-10-05

## 2020-10-06 RX ORDER — METFORMIN HYDROCHLORIDE 500 MG/1
TABLET, EXTENDED RELEASE ORAL
Qty: 30 TABLET | Refills: 0 | Status: SHIPPED | OUTPATIENT
Start: 2020-10-06 | End: 2020-10-21 | Stop reason: SDUPTHER

## 2020-10-09 ENCOUNTER — APPOINTMENT (OUTPATIENT)
Dept: URBAN - METROPOLITAN AREA CLINIC 200 | Age: 60
Setting detail: DERMATOLOGY
End: 2020-10-28

## 2020-10-09 DIAGNOSIS — M31.0 HYPERSENSITIVITY ANGIITIS: ICD-10-CM

## 2020-10-09 PROCEDURE — OTHER SUTURE REMOVAL (NO GLOBAL PERIOD): OTHER

## 2020-10-09 PROCEDURE — OTHER ORDER TESTS: OTHER

## 2020-10-09 PROCEDURE — 99212 OFFICE O/P EST SF 10 MIN: CPT

## 2020-10-09 ASSESSMENT — LOCATION SIMPLE DESCRIPTION DERM: LOCATION SIMPLE: RIGHT FOREARM

## 2020-10-09 ASSESSMENT — LOCATION ZONE DERM: LOCATION ZONE: ARM

## 2020-10-09 ASSESSMENT — LOCATION DETAILED DESCRIPTION DERM: LOCATION DETAILED: RIGHT VENTRAL PROXIMAL FOREARM

## 2020-10-16 ENCOUNTER — OFFICE VISIT (OUTPATIENT)
Dept: PRIMARY CARE | Facility: CLINIC | Age: 60
End: 2020-10-16
Payer: COMMERCIAL

## 2020-10-16 VITALS
WEIGHT: 286 LBS | SYSTOLIC BLOOD PRESSURE: 122 MMHG | HEART RATE: 64 BPM | HEIGHT: 66 IN | TEMPERATURE: 97.8 F | DIASTOLIC BLOOD PRESSURE: 80 MMHG | BODY MASS INDEX: 45.96 KG/M2

## 2020-10-16 DIAGNOSIS — C85.80 MARGINAL ZONE LYMPHOMA (CMS/HCC): ICD-10-CM

## 2020-10-16 DIAGNOSIS — E11.65 TYPE 2 DIABETES MELLITUS WITH HYPERGLYCEMIA, WITHOUT LONG-TERM CURRENT USE OF INSULIN (CMS/HCC): Primary | ICD-10-CM

## 2020-10-16 DIAGNOSIS — R23.3 PETECHIAL RASH: ICD-10-CM

## 2020-10-16 PROBLEM — R79.89 ABNORMAL LFTS: Status: RESOLVED | Noted: 2020-07-21 | Resolved: 2020-10-16

## 2020-10-16 PROCEDURE — 99214 OFFICE O/P EST MOD 30 MIN: CPT | Performed by: NURSE PRACTITIONER

## 2020-10-16 ASSESSMENT — ENCOUNTER SYMPTOMS: DIABETIC ASSOCIATED SYMPTOMS: 0

## 2020-10-16 NOTE — ASSESSMENT & PLAN NOTE
On Metformin BID  Doing well  Home readings are under 150 now.  Has lost 10 lbs!  Continue weight loss program and home management  Repeat labs early December then follow up again.

## 2020-10-16 NOTE — PROGRESS NOTES
[facility]  Meghan Cordova  [address]  [place] [address]  Phone: [phone]  Fax: [phone]      Patient ID: Anh Rowan                              : 1960    Visit Date: 10/16/2020    Chief Complaint: Follow-up         Patient ID: Anh Rowan is a 60 y.o. female.    Patient Active Problem List   Diagnosis   • Essential hypertension, benign   • Fatty liver   • Body mass index (BMI) 45.0-49.9, adult (CMS/HCC)   • Marginal zone lymphoma (CMS/HCC)   • Elevated WBC count   • Petechial rash   • Type 2 diabetes mellitus with hyperglycemia, without long-term current use of insulin (CMS/HCC)         Current Outpatient Medications:   •  blood sugar diagnostic (ACCU-CHEK MARY PLUS TEST STRP) strip, 1 strip 2 (two) times a day., Disp: 100 strip, Rfl: 1  •  blood sugar diagnostic (ACCU-CHEK GUIDE TEST STRIPS) strip, 1 strip 2 (two) times a day., Disp: 200 strip, Rfl: 1  •  blood-glucose meter (ACCU-CHEK MARY PLUS METER) misc, 1 each 2 (two) times a day., Disp: 1 each, Rfl: 0  •  blood-glucose meter (ACCU-CHEK GUIDE GLUCOSE METER) misc, Test bid, Disp: 1 each, Rfl: 0  •  lancets (ACCU-CHEK MULTICLIX LANCET) misc, 1 each 2 (two) times a day., Disp: 100 each, Rfl: 1  •  metFORMIN XR (GLUCOPHAGE-XR) 500 mg 24 hr tablet, TAKE 1 TABLET BY MOUTH EVERY DAY WITH BREAKFAST, Disp: 30 tablet, Rfl: 0    Allergies   Allergen Reactions   • Clarithromycin Rash       Social History     Tobacco Use   • Smoking status: Never Smoker   • Smokeless tobacco: Never Used   Substance Use Topics   • Alcohol use: Not on file   • Drug use: Not on file       Health Maintenance   Topic Date Due   • Urine Protein Screening  1971   • Influenza Vaccine (1) 2021 (Originally 2020)   • Colonoscopy  10/15/2021 (Originally 2011)   • Zoster Vaccine (1 of 2) 10/16/2021 (Originally 2011)   • Hemoglobin A1C  2021   • Annual Dilated Retinal Exam   "10/15/2021   • Diabetic Foot Exam  10/16/2021   • Mammogram  07/20/2022   • Cervical Cancer Screening  03/07/2023   • Hepatitis C Screening  Completed   • Meningococcal ACWY  Aged Out   • HIB Vaccines  Aged Out   • IPV Vaccines  Aged Out   • HPV Vaccines  Aged Out   • DTaP, Tdap, and Td Vaccines  Discontinued   • Varicella Vaccines  Discontinued   • HIV Screening  Discontinued   • Pneumococcal  Discontinued       HPI  Follow up DM  Doing well.  Tolerating Metformin BID.  Monitoring blood sugars BID  Running under 150 now.  Still on Kayy Artemio.    Diabetes  She presents for her follow-up diabetic visit. She has type 2 diabetes mellitus. Her disease course has been improving. There are no hypoglycemic associated symptoms. There are no diabetic associated symptoms. Current diabetic treatment includes oral agent (monotherapy). She is compliant with treatment all of the time. Her weight is decreasing steadily. She is following a diabetic diet. Meal planning includes avoidance of concentrated sweets. She participates in exercise daily. Her home blood glucose trend is decreasing steadily. Her breakfast blood glucose range is generally 130-140 mg/dl. Her dinner blood glucose range is generally 130-140 mg/dl. An ACE inhibitor/angiotensin II receptor blocker is not being taken. She does not see a podiatrist.Eye exam is current.       The following have been reviewed and updated as appropriate in this visit:  Allergies  Meds  Problems         Review of System  Review of Systems    Objective     Vitals  Vitals:    10/16/20 1542   BP: 122/80   BP Location: Left forearm   Patient Position: Sitting   Pulse: 64   Temp: 36.6 °C (97.8 °F)   Weight: 130 kg (286 lb)   Height: 1.67 m (5' 5.75\")     Body mass index is 46.51 kg/m².    Physical Exam  Physical Exam  Cardiovascular:      Pulses:           Dorsalis pedis pulses are 2+ on the right side and 2+ on the left side.   Musculoskeletal:      Right foot: Normal range of motion. No " deformity or bunion.      Left foot: Normal range of motion. No deformity or bunion.   Feet:      Right foot:      Skin integrity: Skin integrity normal.      Left foot:      Skin integrity: Skin integrity normal.      Comments: Normal sensation bilat.        Assessment/Plan     Problem List Items Addressed This Visit     Body mass index (BMI) 45.0-49.9, adult (CMS/[facility])     Losing weight steadily  Continue Kayy Ulloa.         Marginal zone lymphoma (CMS/[facility])     Has follow up with oncology in early November.  Has been stable.         Petechial rash     Rash still present.  No symptoms  Will discuss with Oncology at upcoming appt.         Type 2 diabetes mellitus with hyperglycemia, without long-term current use of insulin (CMS/[facility]) - Primary     On Metformin BID  Doing well  Home readings are under 150 now.  Has lost 10 lbs!  Continue weight loss program and home management  Repeat labs early December then follow up again.         Relevant Orders    Microalbumin/Creatinine Ur Random    Hemoglobin A1c    Comprehensive metabolic panel        Over 50% of 25 minute visit spent in counseling and education today        HAYDER James  10/16/2020

## 2020-10-21 DIAGNOSIS — E11.65 TYPE 2 DIABETES MELLITUS WITH HYPERGLYCEMIA, WITHOUT LONG-TERM CURRENT USE OF INSULIN (CMS/HCC): ICD-10-CM

## 2020-10-21 RX ORDER — METFORMIN HYDROCHLORIDE 500 MG/1
1000 TABLET, EXTENDED RELEASE ORAL
Qty: 60 TABLET | Refills: 0 | Status: SHIPPED | OUTPATIENT
Start: 2020-10-21 | End: 2020-11-18 | Stop reason: SDUPTHER

## 2020-10-21 RX ORDER — BLOOD SUGAR DIAGNOSTIC
1 STRIP MISCELLANEOUS 2 TIMES DAILY
Qty: 200 STRIP | Refills: 1 | Status: SHIPPED | OUTPATIENT
Start: 2020-10-21 | End: 2021-11-01

## 2020-10-21 RX ORDER — LANCETS
1 EACH MISCELLANEOUS 2 TIMES DAILY
Qty: 100 EACH | Refills: 1 | Status: SHIPPED | OUTPATIENT
Start: 2020-10-21 | End: 2020-12-29 | Stop reason: SDUPTHER

## 2020-10-28 DIAGNOSIS — E11.65 TYPE 2 DIABETES MELLITUS WITH HYPERGLYCEMIA, WITHOUT LONG-TERM CURRENT USE OF INSULIN (CMS/HCC): ICD-10-CM

## 2020-10-28 RX ORDER — METFORMIN HYDROCHLORIDE 500 MG/1
TABLET, EXTENDED RELEASE ORAL
Qty: 60 TABLET | Refills: 0 | OUTPATIENT
Start: 2020-10-28

## 2020-10-28 NOTE — TELEPHONE ENCOUNTER
Medicine Refill Request    Last Office Visit: 10/16/2020  Last Telemedicine Visit: 8/22/2020 Gail Ricks MD    Next Office Visit: 12/9/2020  Next Telemedicine Visit: Visit date not found         Current Outpatient Medications:   •  blood sugar diagnostic (ACCU-CHEK MARY PLUS TEST STRP) strip, 1 strip 2 (two) times a day., Disp: 100 strip, Rfl: 1  •  blood sugar diagnostic (ACCU-CHEK GUIDE TEST STRIPS) strip, 1 strip 2 (two) times a day., Disp: 200 strip, Rfl: 1  •  blood-glucose meter (ACCU-CHEK MARY PLUS METER) misc, 1 each 2 (two) times a day., Disp: 1 each, Rfl: 0  •  blood-glucose meter (ACCU-CHEK GUIDE GLUCOSE METER) misc, Test bid, Disp: 1 each, Rfl: 0  •  lancets (ACCU-CHEK MULTICLIX LANCET) misc, 1 each 2 (two) times a day., Disp: 100 each, Rfl: 1  •  metFORMIN XR (GLUCOPHAGE-XR) 500 mg 24 hr tablet, Take 2 tablets (1,000 mg total) by mouth daily with breakfast., Disp: 60 tablet, Rfl: 0      BP Readings from Last 3 Encounters:   10/16/20 122/80   09/11/20 120/80   09/04/20 140/88       Recent Lab results:  No results found for: CHOL, No results found for: HDL, No results found for: LDLCALC, No results found for: TRIG     Lab Results   Component Value Date    GLUCOSE 204 (H) 10/03/2020   ,   Lab Results   Component Value Date    HGBA1C 11.3 (H) 09/04/2020         Lab Results   Component Value Date    CREATININE 0.84 10/03/2020       Lab Results   Component Value Date    TSH Comment 03/23/2019

## 2020-11-12 ENCOUNTER — TELEPHONE (OUTPATIENT)
Dept: PRIMARY CARE | Facility: CLINIC | Age: 60
End: 2020-11-12

## 2020-11-12 ENCOUNTER — RX ONLY (RX ONLY)
Age: 60
End: 2020-11-12

## 2020-11-12 RX ORDER — COLCHICINE 0.6 MG/1
TABLET, FILM COATED ORAL
Qty: 14 | Refills: 0 | Status: ERX | COMMUNITY
Start: 2020-11-12

## 2020-11-12 NOTE — TELEPHONE ENCOUNTER
Patient has a question regarding test strips.  She says she never has enough, she runs short.  Patient would like to talk to Meghan about this.

## 2020-11-12 NOTE — TELEPHONE ENCOUNTER
She is not due in yet. I can call in more. She can test twice a day total. Please get info to what she needs.

## 2020-11-13 NOTE — TELEPHONE ENCOUNTER
Called patient and left her VM and gave her Meghan's message below. Patient is advised to call the office to inform Meghan what else she needs

## 2020-11-18 DIAGNOSIS — E11.65 TYPE 2 DIABETES MELLITUS WITH HYPERGLYCEMIA, WITHOUT LONG-TERM CURRENT USE OF INSULIN (CMS/HCC): ICD-10-CM

## 2020-11-18 RX ORDER — METFORMIN HYDROCHLORIDE 500 MG/1
1000 TABLET, EXTENDED RELEASE ORAL
Qty: 180 TABLET | Refills: 0 | Status: SHIPPED | OUTPATIENT
Start: 2020-11-18 | End: 2021-01-03 | Stop reason: SDUPTHER

## 2020-12-06 LAB
ALBUMIN SERPL-MCNC: 4.4 G/DL (ref 3.8–4.9)
ALBUMIN/CREAT UR: 17 MG/G CREAT (ref 0–29)
ALBUMIN/GLOB SERPL: 1.6 {RATIO} (ref 1.2–2.2)
ALP SERPL-CCNC: 75 IU/L (ref 39–117)
ALT SERPL-CCNC: 25 IU/L (ref 0–32)
AST SERPL-CCNC: 31 IU/L (ref 0–40)
BILIRUB SERPL-MCNC: 0.4 MG/DL (ref 0–1.2)
BUN SERPL-MCNC: 9 MG/DL (ref 6–24)
BUN/CREAT SERPL: 13 (ref 9–23)
CALCIUM SERPL-MCNC: 9.7 MG/DL (ref 8.7–10.2)
CHLORIDE SERPL-SCNC: 102 MMOL/L (ref 96–106)
CO2 SERPL-SCNC: 25 MMOL/L (ref 20–29)
CREAT SERPL-MCNC: 0.72 MG/DL (ref 0.57–1)
CREAT UR-MCNC: 132.4 MG/DL
GLOBULIN SER CALC-MCNC: 2.7 G/DL (ref 1.5–4.5)
GLUCOSE SERPL-MCNC: 161 MG/DL (ref 65–99)
HBA1C MFR BLD: 7.3 % (ref 4.8–5.6)
LAB CORP EGFR IF AFRICN AM: 106 ML/MIN/1.73
LAB CORP EGFR IF NONAFRICN AM: 92 ML/MIN/1.73
MICROALBUMIN UR-MCNC: 23.1 UG/ML
POTASSIUM SERPL-SCNC: 4.6 MMOL/L (ref 3.5–5.2)
PROT SERPL-MCNC: 7.1 G/DL (ref 6–8.5)
SODIUM SERPL-SCNC: 141 MMOL/L (ref 134–144)

## 2020-12-09 ENCOUNTER — OFFICE VISIT (OUTPATIENT)
Dept: PRIMARY CARE | Facility: CLINIC | Age: 60
End: 2020-12-09
Payer: COMMERCIAL

## 2020-12-09 DIAGNOSIS — C85.80 MARGINAL ZONE LYMPHOMA (CMS/HCC): ICD-10-CM

## 2020-12-09 DIAGNOSIS — J01.00 ACUTE NON-RECURRENT MAXILLARY SINUSITIS: ICD-10-CM

## 2020-12-09 DIAGNOSIS — E11.65 TYPE 2 DIABETES MELLITUS WITH HYPERGLYCEMIA, WITHOUT LONG-TERM CURRENT USE OF INSULIN (CMS/HCC): Primary | ICD-10-CM

## 2020-12-09 PROBLEM — D72.829 ELEVATED WBC COUNT: Status: RESOLVED | Noted: 2019-04-04 | Resolved: 2020-12-09

## 2020-12-09 PROBLEM — R23.3 PETECHIAL RASH: Status: RESOLVED | Noted: 2020-09-04 | Resolved: 2020-12-09

## 2020-12-09 PROCEDURE — 99214 OFFICE O/P EST MOD 30 MIN: CPT | Performed by: NURSE PRACTITIONER

## 2020-12-09 RX ORDER — AMOXICILLIN 875 MG/1
875 TABLET, FILM COATED ORAL 2 TIMES DAILY
Qty: 20 TABLET | Refills: 0 | Status: SHIPPED | OUTPATIENT
Start: 2020-12-09 | End: 2020-12-19

## 2020-12-09 NOTE — PROGRESS NOTES
[facility]  Meghan Cordova  [address]  [place] [address]  Phone: [phone]  Fax: [phone]      Patient ID: Anh Rowan                              : 1960    Visit Date: 2020    Chief Complaint: Diabetes         Patient ID: Anh Rowan is a 60 y.o. female.    Patient Active Problem List   Diagnosis   • Essential hypertension, benign   • Fatty liver   • BMI 40.0-44.9, adult (CMS/HCC)   • Marginal zone lymphoma (CMS/HCC)   • Type 2 diabetes mellitus with hyperglycemia, without long-term current use of insulin (CMS/HCC)   • Acute non-recurrent maxillary sinusitis         Current Outpatient Medications:   •  blood sugar diagnostic (ACCU-CHEK GUIDE TEST STRIPS) strip, 1 strip 2 (two) times a day., Disp: 200 strip, Rfl: 1  •  blood-glucose meter (ACCU-CHEK GUIDE GLUCOSE METER) misc, Test bid, Disp: 1 each, Rfl: 0  •  lancets (ACCU-CHEK MULTICLIX LANCET) misc, 1 each 2 (two) times a day., Disp: 100 each, Rfl: 1  •  metFORMIN XR (GLUCOPHAGE-XR) 500 mg 24 hr tablet, Take 2 tablets (1,000 mg total) by mouth daily with breakfast., Disp: 180 tablet, Rfl: 0  •  amoxicillin (AMOXIL) 875 mg tablet, Take 1 tablet (875 mg total) by mouth 2 (two) times a day for 10 days., Disp: 20 tablet, Rfl: 0    Allergies   Allergen Reactions   • Clarithromycin Rash       Social History     Tobacco Use   • Smoking status: Never Smoker   • Smokeless tobacco: Never Used   Substance Use Topics   • Alcohol use: Not on file   • Drug use: Not on file       Health Maintenance   Topic Date Due   • Influenza Vaccine (1) 2021 (Originally 2020)   • Colonoscopy  10/15/2021 (Originally 2011)   • Zoster Vaccine (1 of 2) 10/16/2021 (Originally 2011)   • Annual Dilated Retinal Exam  10/15/2021   • Diabetic Foot Exam  10/16/2021   • Hemoglobin A1C  2021   • Urine Protein Screening  2021   • Mammogram  2022   • Cervical Cancer Screening   03/07/2023   • Hepatitis C Screening  Completed   • Meningococcal ACWY  Aged Out   • HIB Vaccines  Aged Out   • IPV Vaccines  Aged Out   • HPV Vaccines  Aged Out   • DTaP, Tdap, and Td Vaccines  Discontinued   • Varicella Vaccines  Discontinued   • HIV Screening  Discontinued   • Pneumococcal  Discontinued       HPI  Follow up --DM  Discuss labs.    Possible sinus infection.    Diabetes  She presents for her follow-up diabetic visit. She has type 2 diabetes mellitus. Her disease course has been improving. Hypoglycemia symptoms include headaches. There are no diabetic associated symptoms. Current diabetic treatment includes oral agent (monotherapy). She is compliant with treatment all of the time. Her weight is decreasing steadily. She is following a generally healthy diet. Meal planning includes avoidance of concentrated sweets. She participates in exercise three times a week. There is no change in her home blood glucose trend. Her breakfast blood glucose range is generally 110-130 mg/dl. An ACE inhibitor/angiotensin II receptor blocker is not being taken. She does not see a podiatrist.Eye exam is current.   Sinus Problem  This is a new problem. The current episode started in the past 7 days. The problem is unchanged. There has been no fever. The pain is moderate. Associated symptoms include congestion, headaches and sinus pressure. Pertinent negatives include no chills, coughing, diaphoresis, ear pain, hoarse voice, neck pain, shortness of breath, sneezing, sore throat or swollen glands. (Mucus is green/yellow) Past treatments include nothing.       The following have been reviewed and updated as appropriate in this visit:         Review of System  Review of Systems   Constitutional: Negative for chills and diaphoresis.   HENT: Positive for congestion and sinus pressure. Negative for ear pain, hoarse voice, sneezing and sore throat.    Respiratory: Negative for cough and shortness of breath.    Musculoskeletal:  "Negative for neck pain.   Neurological: Positive for headaches.       Objective     Vitals  Vitals:    12/09/20 1531   BP: 124/80   BP Location: Left upper arm   Patient Position: Sitting   Pulse: 88   Temp: 36.2 °C (97.2 °F)   SpO2: 97%   Weight: 127 kg (281 lb)   Height: 1.715 m (5' 7.5\")     Body mass index is 43.36 kg/m².    Physical Exam  Physical Exam  Vitals signs reviewed.   Constitutional:       General: She is not in acute distress.     Appearance: Normal appearance. She is not diaphoretic.   HENT:      Right Ear: Tympanic membrane, ear canal and external ear normal.      Left Ear: Tympanic membrane, ear canal and external ear normal.      Nose: Mucosal edema and rhinorrhea present.      Right Sinus: Maxillary sinus tenderness present.      Left Sinus: Maxillary sinus tenderness present.      Mouth/Throat:      Mouth: Mucous membranes are moist.      Pharynx: Oropharynx is clear. Posterior oropharyngeal erythema present. No oropharyngeal exudate.   Neck:      Musculoskeletal: Neck supple. No neck rigidity or muscular tenderness.   Cardiovascular:      Rate and Rhythm: Normal rate and regular rhythm.      Heart sounds: No murmur. No friction rub. No gallop.    Pulmonary:      Effort: Pulmonary effort is normal.      Breath sounds: Normal breath sounds. No wheezing, rhonchi or rales.   Musculoskeletal:      Right lower leg: No edema.      Left lower leg: No edema.   Lymphadenopathy:      Cervical: No cervical adenopathy.   Neurological:      Mental Status: She is alert and oriented to person, place, and time.         Assessment/Plan     Problem List Items Addressed This Visit     BMI 40.0-44.9, adult (CMS/HCC)     BMI lower @ 43  Continue weight loss efforts with Kayy Ulloa.  Regular exercise.         Marginal zone lymphoma (CMS/HCC)     Stable.  Dr Matias tapia.         Type 2 diabetes mellitus with hyperglycemia, without long-term current use of insulin (CMS/[facility]) - Primary     Excellent " improvement!  A1C down from 11.3 to 7.3.  Increase Metformin to 2 po BID  Stay on Kayydimple Collazoig and continue weight loss efforts( lost 20 lbs)  Continue to monitor blood glucose at home  Repeat A1C 3 mo then follow up.         Relevant Orders    Hemoglobin A1c    Acute non-recurrent maxillary sinusitis     Amox BID #20  Push po fluids  Saline NS PRN  Follow up if symptoms worsen/persist.         Relevant Medications    amoxicillin (AMOXIL) 875 mg tablet              HAYDER James  12/10/2020

## 2020-12-10 VITALS
HEIGHT: 68 IN | BODY MASS INDEX: 42.59 KG/M2 | TEMPERATURE: 97.2 F | WEIGHT: 281 LBS | SYSTOLIC BLOOD PRESSURE: 124 MMHG | OXYGEN SATURATION: 97 % | HEART RATE: 88 BPM | DIASTOLIC BLOOD PRESSURE: 80 MMHG

## 2020-12-10 PROBLEM — J01.00 ACUTE NON-RECURRENT MAXILLARY SINUSITIS: Status: ACTIVE | Noted: 2020-12-10

## 2020-12-10 ASSESSMENT — ENCOUNTER SYMPTOMS
COUGH: 0
HOARSE VOICE: 0
SORE THROAT: 0
CHILLS: 0
HEADACHES: 1
DIAPHORESIS: 0
SHORTNESS OF BREATH: 0
DIABETIC ASSOCIATED SYMPTOMS: 0
SWOLLEN GLANDS: 0
NECK PAIN: 0
SINUS PRESSURE: 1
SINUS COMPLAINT: 1

## 2020-12-10 NOTE — ASSESSMENT & PLAN NOTE
Excellent improvement!  A1C down from 11.3 to 7.3.  Increase Metformin to 2 po BID  Stay on Kayy Artemio and continue weight loss efforts( lost 20 lbs)  Continue to monitor blood glucose at home  Repeat A1C 3 mo then follow up.

## 2020-12-29 DIAGNOSIS — E11.65 TYPE 2 DIABETES MELLITUS WITH HYPERGLYCEMIA, WITHOUT LONG-TERM CURRENT USE OF INSULIN (CMS/HCC): ICD-10-CM

## 2020-12-29 RX ORDER — LANCETS
1 EACH MISCELLANEOUS 2 TIMES DAILY
Qty: 102 EACH | Refills: 1 | Status: SHIPPED | OUTPATIENT
Start: 2020-12-29 | End: 2021-03-29

## 2020-12-29 NOTE — TELEPHONE ENCOUNTER
Medicine Refill Request    Last Office Visit: 12/9/2020  Last Telemedicine Visit: 8/22/2020 Gail Ricks MD    Next Office Visit: Visit date not found  Next Telemedicine Visit: Visit date not found     Insurance is requesting rx be for 90 day supply for lancets    Current Outpatient Medications:   •  blood sugar diagnostic (ACCU-CHEK GUIDE TEST STRIPS) strip, 1 strip 2 (two) times a day., Disp: 200 strip, Rfl: 1  •  blood-glucose meter (ACCU-CHEK GUIDE GLUCOSE METER) misc, Test bid, Disp: 1 each, Rfl: 0  •  lancets (ACCU-CHEK MULTICLIX LANCET) misc, 1 each 2 (two) times a day., Disp: 100 each, Rfl: 1  •  metFORMIN XR (GLUCOPHAGE-XR) 500 mg 24 hr tablet, Take 2 tablets (1,000 mg total) by mouth daily with breakfast., Disp: 180 tablet, Rfl: 0      BP Readings from Last 3 Encounters:   12/09/20 124/80   10/16/20 122/80   09/11/20 120/80       Recent Lab results:  No results found for: CHOL, No results found for: HDL, No results found for: LDLCALC, No results found for: TRIG     Lab Results   Component Value Date    GLUCOSE 161 (H) 12/05/2020   ,   Lab Results   Component Value Date    HGBA1C 7.3 (H) 12/05/2020         Lab Results   Component Value Date    CREATININE 0.72 12/05/2020       Lab Results   Component Value Date    TSH Comment 03/23/2019

## 2021-01-03 DIAGNOSIS — E11.65 TYPE 2 DIABETES MELLITUS WITH HYPERGLYCEMIA, WITHOUT LONG-TERM CURRENT USE OF INSULIN (CMS/HCC): ICD-10-CM

## 2021-01-04 NOTE — TELEPHONE ENCOUNTER
Medicine Refill Request    Last Office Visit: 12/9/2020  Last Telemedicine Visit: 8/22/2020 Gail Ricks MD    Next Office Visit: Visit date not found  Next Telemedicine Visit: Visit date not found         Current Outpatient Medications:   •  blood sugar diagnostic (ACCU-CHEK GUIDE TEST STRIPS) strip, 1 strip 2 (two) times a day., Disp: 200 strip, Rfl: 1  •  blood-glucose meter (ACCU-CHEK GUIDE GLUCOSE METER) misc, Test bid, Disp: 1 each, Rfl: 0  •  lancets (ACCU-CHEK MULTICLIX LANCET) misc, 1 each 2 (two) times a day., Disp: 102 each, Rfl: 1  •  metFORMIN XR (GLUCOPHAGE-XR) 500 mg 24 hr tablet, Take 2 tablets (1,000 mg total) by mouth daily with breakfast., Disp: 180 tablet, Rfl: 0      BP Readings from Last 3 Encounters:   12/09/20 124/80   10/16/20 122/80   09/11/20 120/80       Recent Lab results:  No results found for: CHOL, No results found for: HDL, No results found for: LDLCALC, No results found for: TRIG     Lab Results   Component Value Date    GLUCOSE 161 (H) 12/05/2020   ,   Lab Results   Component Value Date    HGBA1C 7.3 (H) 12/05/2020         Lab Results   Component Value Date    CREATININE 0.72 12/05/2020       Lab Results   Component Value Date    TSH Comment 03/23/2019

## 2021-01-05 RX ORDER — METFORMIN HYDROCHLORIDE 500 MG/1
1000 TABLET, EXTENDED RELEASE ORAL
Qty: 180 TABLET | Refills: 0 | Status: SHIPPED | OUTPATIENT
Start: 2021-01-05 | End: 2021-01-11 | Stop reason: SDUPTHER

## 2021-01-11 ENCOUNTER — TELEPHONE (OUTPATIENT)
Dept: PRIMARY CARE | Facility: CLINIC | Age: 61
End: 2021-01-11

## 2021-01-11 DIAGNOSIS — E11.65 TYPE 2 DIABETES MELLITUS WITH HYPERGLYCEMIA, WITHOUT LONG-TERM CURRENT USE OF INSULIN (CMS/HCC): ICD-10-CM

## 2021-01-11 RX ORDER — METFORMIN HYDROCHLORIDE 500 MG/1
TABLET, EXTENDED RELEASE ORAL
Qty: 360 TABLET | Refills: 0 | Status: SHIPPED | OUTPATIENT
Start: 2021-01-11 | End: 2021-04-06

## 2021-01-11 NOTE — TELEPHONE ENCOUNTER
Meghan please clarify dosage of metformin looks like pt is on 1000mg with breakfast. But her request for refill is 2 tabs in the AM and 2 in the afternoon . Tried to call pt to verify LMOM for pt to return call

## 2021-01-11 NOTE — TELEPHONE ENCOUNTER
Patient said the pharmacy will not dispense her metformin because of the way the script is written.  It needs to say take 2 in the morning and 2 in the afternoon.    Please resent script.      Patient knows Meghan is not in today.

## 2021-01-11 NOTE — TELEPHONE ENCOUNTER
LMOM for pt I looked at the 2 last OV notes and do not see where pt's metformin is 2 tab in AM and 2 in the afternoon . Told pt I will contact her tomorrow after your clarification

## 2021-02-21 LAB — HBA1C MFR BLD: 6.3 % (ref 4.8–5.6)

## 2021-03-03 ENCOUNTER — APPOINTMENT (OUTPATIENT)
Dept: URBAN - METROPOLITAN AREA CLINIC 200 | Age: 61
Setting detail: DERMATOLOGY
End: 2021-03-05

## 2021-03-03 DIAGNOSIS — L71.0 PERIORAL DERMATITIS: ICD-10-CM

## 2021-03-03 PROCEDURE — OTHER PRESCRIPTION: OTHER

## 2021-03-03 PROCEDURE — 99213 OFFICE O/P EST LOW 20 MIN: CPT

## 2021-03-03 PROCEDURE — OTHER MIPS QUALITY: OTHER

## 2021-03-03 RX ORDER — AZELAIC ACID 0.15 G/G
AEROSOL, FOAM TOPICAL
Qty: 1 | Refills: 3 | Status: ERX | COMMUNITY
Start: 2021-03-03

## 2021-03-03 ASSESSMENT — LOCATION ZONE DERM: LOCATION ZONE: FACE

## 2021-03-03 ASSESSMENT — LOCATION SIMPLE DESCRIPTION DERM: LOCATION SIMPLE: LEFT FOREHEAD

## 2021-03-03 ASSESSMENT — LOCATION DETAILED DESCRIPTION DERM: LOCATION DETAILED: LEFT INFERIOR MEDIAL FOREHEAD

## 2021-03-05 ENCOUNTER — OFFICE VISIT (OUTPATIENT)
Dept: PRIMARY CARE | Facility: CLINIC | Age: 61
End: 2021-03-05
Payer: COMMERCIAL

## 2021-03-05 VITALS
BODY MASS INDEX: 39.6 KG/M2 | HEIGHT: 68 IN | TEMPERATURE: 98.5 F | HEART RATE: 70 BPM | SYSTOLIC BLOOD PRESSURE: 140 MMHG | WEIGHT: 261.3 LBS | DIASTOLIC BLOOD PRESSURE: 80 MMHG | OXYGEN SATURATION: 98 %

## 2021-03-05 DIAGNOSIS — C85.80 MARGINAL ZONE LYMPHOMA (CMS/HCC): ICD-10-CM

## 2021-03-05 DIAGNOSIS — E11.9 TYPE 2 DIABETES MELLITUS WITHOUT COMPLICATION, WITHOUT LONG-TERM CURRENT USE OF INSULIN (CMS/HCC): Primary | ICD-10-CM

## 2021-03-05 PROBLEM — J01.00 ACUTE NON-RECURRENT MAXILLARY SINUSITIS: Status: RESOLVED | Noted: 2020-12-10 | Resolved: 2021-03-05

## 2021-03-05 PROCEDURE — 99214 OFFICE O/P EST MOD 30 MIN: CPT | Performed by: NURSE PRACTITIONER

## 2021-03-05 ASSESSMENT — ENCOUNTER SYMPTOMS
RESPIRATORY NEGATIVE: 1
CARDIOVASCULAR NEGATIVE: 1
CONSTITUTIONAL NEGATIVE: 1
ENDOCRINE NEGATIVE: 1
DIABETIC ASSOCIATED SYMPTOMS: 0

## 2021-03-05 NOTE — PROGRESS NOTES
[facility]  Meghan Cordova  [address]  [place] [address]  Phone: [phone]  Fax: [phone]      Patient ID: Anh Rowan                              : 1960    Visit Date: 3/5/2021    Chief Complaint: Diabetes         Patient ID: Anh Rowan is a 60 y.o. female.    Patient Active Problem List   Diagnosis   • Essential hypertension, benign   • Fatty liver   • BMI 40.0-44.9, adult (CMS/HCC)   • Marginal zone lymphoma (CMS/HCC)   • Type 2 diabetes mellitus without complication, without long-term current use of insulin (CMS/HCC)         Current Outpatient Medications:   •  blood sugar diagnostic (ACCU-CHEK GUIDE TEST STRIPS) strip, 1 strip 2 (two) times a day., Disp: 200 strip, Rfl: 1  •  blood-glucose meter (ACCU-CHEK GUIDE GLUCOSE METER) misc, Test bid, Disp: 1 each, Rfl: 0  •  lancets (ACCU-CHEK MULTICLIX LANCET) misc, 1 each 2 (two) times a day., Disp: 102 each, Rfl: 1  •  metFORMIN XR (GLUCOPHAGE-XR) 500 mg 24 hr tablet, 2 po BID, Disp: 360 tablet, Rfl: 0    Allergies   Allergen Reactions   • Clarithromycin Rash       Social History     Tobacco Use   • Smoking status: Never Smoker   • Smokeless tobacco: Never Used   Substance Use Topics   • Alcohol use: Not on file   • Drug use: Not on file       Health Maintenance   Topic Date Due   • Influenza Vaccine (1) 2021 (Originally 2020)   • Colonoscopy  10/15/2021 (Originally 2011)   • Zoster Vaccine (1 of 2) 10/16/2021 (Originally 2011)   • Annual Dilated Retinal Exam  10/15/2021   • Diabetic Foot Exam  10/16/2021   • Urine Protein Screening  2021   • Hemoglobin A1C  2022   • Mammogram  2022   • Cervical Cancer Screening  2023   • Hepatitis C Screening  Completed   • Meningococcal ACWY  Aged Out   • HIB Vaccines  Aged Out   • IPV Vaccines  Aged Out   • HPV Vaccines  Aged Out   • DTaP, Tdap, and Td Vaccines  Discontinued   • Varicella Vaccines   "Discontinued   • HIV Screening  Discontinued   • Pneumococcal  Discontinued       HPI  Follow up DM    Diabetes  She presents for her follow-up diabetic visit. She has type 2 diabetes mellitus. Her disease course has been stable. There are no hypoglycemic associated symptoms. There are no diabetic associated symptoms. Her weight is decreasing steadily. She is following a generally healthy diet. Meal planning includes avoidance of concentrated sweets. She participates in exercise daily. There is no change in her home blood glucose trend. Her breakfast blood glucose is taken between 6-7 am. Her breakfast blood glucose range is generally  mg/dl. An ACE inhibitor/angiotensin II receptor blocker is not being taken. She sees a podiatrist.Eye exam is current.       The following have been reviewed and updated as appropriate in this visit:  Allergies  Meds  Problems         Review of System  Review of Systems   Constitutional: Negative.    Respiratory: Negative.    Cardiovascular: Negative.    Endocrine: Negative.        Objective     Vitals  Vitals:    03/05/21 1532   BP: 140/80   BP Location: Left upper arm   Patient Position: Sitting   Pulse: 70   Temp: 36.9 °C (98.5 °F)   SpO2: 98%   Weight: 119 kg (261 lb 4.8 oz)   Height: 1.715 m (5' 7.5\")     Body mass index is 40.32 kg/m².    Physical Exam  Physical Exam  Constitutional:       General: She is not in acute distress.     Appearance: Normal appearance. She is obese. She is not diaphoretic.   Neck:      Musculoskeletal: Neck supple. No neck rigidity or muscular tenderness.      Thyroid: No thyromegaly.   Cardiovascular:      Rate and Rhythm: Normal rate and regular rhythm.      Heart sounds: No murmur. No friction rub. No gallop.    Pulmonary:      Effort: Pulmonary effort is normal.      Breath sounds: Normal breath sounds. No wheezing, rhonchi or rales.   Musculoskeletal:      Right lower leg: No edema.      Left lower leg: No edema.   Lymphadenopathy:      " Cervical: No cervical adenopathy.   Neurological:      Mental Status: She is alert and oriented to person, place, and time.         Assessment/Plan     Problem List Items Addressed This Visit     BMI 40.0-44.9, adult (CMS/[facility])     Has lost 40 lbs total.  Continue weight loss efforts.  Continue regular exercise.         Marginal zone lymphoma (CMS/[facility])     Stable.  Dr Salcedo following every 6 mo.  Recent CT was good.  Labs have been stable.         Type 2 diabetes mellitus without complication, without long-term current use of insulin (CMS/[facility]) - Primary     Stable now  A1C 6.3  Continues to lose weight  Continue Metformin 2 BID  Repeat A1C 3 mo  Does not want to start ACE.  NL microalbumin.  Will address lipids when repeated in the fall.         Relevant Orders    Hemoglobin A1c        I spent 30 minutes on this date of service performing the following activities: obtaining history, performing examination, entering orders, documenting, preparing for visit, obtaining / reviewing records and providing counseling and education.      HAYDER James  3/5/2021

## 2021-03-05 NOTE — ASSESSMENT & PLAN NOTE
Stable now  A1C 6.3  Continues to lose weight  Continue Metformin 2 BID  Repeat A1C 3 mo  Does not want to start ACE.  NL microalbumin.  Will address lipids when repeated in the fall.

## 2021-03-29 DIAGNOSIS — E11.65 TYPE 2 DIABETES MELLITUS WITH HYPERGLYCEMIA, WITHOUT LONG-TERM CURRENT USE OF INSULIN (CMS/HCC): ICD-10-CM

## 2021-03-29 RX ORDER — LANCETS
EACH MISCELLANEOUS
Qty: 100 EACH | Refills: 0 | Status: SHIPPED | OUTPATIENT
Start: 2021-03-29 | End: 2021-07-07

## 2021-03-29 NOTE — TELEPHONE ENCOUNTER
Medicine Refill Request    Last Office Visit: 3/5/2021  Last Telemedicine Visit: 8/22/2020 Gail Ricks MD    Next Office Visit: 6/24/2021  Next Telemedicine Visit: Visit date not found         Current Outpatient Medications:   •  blood sugar diagnostic (ACCU-CHEK GUIDE TEST STRIPS) strip, 1 strip 2 (two) times a day., Disp: 200 strip, Rfl: 1  •  blood-glucose meter (ACCU-CHEK GUIDE GLUCOSE METER) misc, Test bid, Disp: 1 each, Rfl: 0  •  lancets (ACCU-CHEK MULTICLIX LANCET) misc, 1 each 2 (two) times a day., Disp: 102 each, Rfl: 1  •  metFORMIN XR (GLUCOPHAGE-XR) 500 mg 24 hr tablet, 2 po BID, Disp: 360 tablet, Rfl: 0      BP Readings from Last 3 Encounters:   03/05/21 140/80   12/09/20 124/80   10/16/20 122/80       Recent Lab results:  No results found for: CHOL, No results found for: HDL, No results found for: LDLCALC, No results found for: TRIG     Lab Results   Component Value Date    GLUCOSE 161 (H) 12/05/2020   ,   Lab Results   Component Value Date    HGBA1C 6.3 (H) 02/20/2021         Lab Results   Component Value Date    CREATININE 0.72 12/05/2020       Lab Results   Component Value Date    TSH Comment 03/23/2019

## 2021-04-06 DIAGNOSIS — E11.65 TYPE 2 DIABETES MELLITUS WITH HYPERGLYCEMIA, WITHOUT LONG-TERM CURRENT USE OF INSULIN (CMS/HCC): ICD-10-CM

## 2021-04-06 RX ORDER — METFORMIN HYDROCHLORIDE 500 MG/1
TABLET, EXTENDED RELEASE ORAL
Qty: 360 TABLET | Refills: 0 | Status: SHIPPED | OUTPATIENT
Start: 2021-04-06 | End: 2021-06-24 | Stop reason: SDUPTHER

## 2021-04-06 NOTE — TELEPHONE ENCOUNTER
Medicine Refill Request    Last Office Visit: 3/5/2021  Last Telemedicine Visit: 8/22/2020 Gail Ricks MD    Next Office Visit: 6/24/2021  Next Telemedicine Visit: Visit date not found         Current Outpatient Medications:   •  ACCU-CHEK FASTCLIX LANCET DRUM misc, USE TO TEST BLOOD SUGAR TWICE A DAY, Disp: 100 each, Rfl: 0  •  blood sugar diagnostic (ACCU-CHEK GUIDE TEST STRIPS) strip, 1 strip 2 (two) times a day., Disp: 200 strip, Rfl: 1  •  blood-glucose meter (ACCU-CHEK GUIDE GLUCOSE METER) misc, Test bid, Disp: 1 each, Rfl: 0  •  metFORMIN XR (GLUCOPHAGE-XR) 500 mg 24 hr tablet, 2 po BID, Disp: 360 tablet, Rfl: 0      BP Readings from Last 3 Encounters:   03/05/21 140/80   12/09/20 124/80   10/16/20 122/80       Recent Lab results:  No results found for: CHOL, No results found for: HDL, No results found for: LDLCALC, No results found for: TRIG     Lab Results   Component Value Date    GLUCOSE 161 (H) 12/05/2020   ,   Lab Results   Component Value Date    HGBA1C 6.3 (H) 02/20/2021         Lab Results   Component Value Date    CREATININE 0.72 12/05/2020       Lab Results   Component Value Date    TSH Comment 03/23/2019

## 2021-04-14 ENCOUNTER — APPOINTMENT (OUTPATIENT)
Dept: URBAN - METROPOLITAN AREA CLINIC 200 | Age: 61
Setting detail: DERMATOLOGY
End: 2021-04-15

## 2021-04-14 DIAGNOSIS — L60.8 OTHER NAIL DISORDERS: ICD-10-CM

## 2021-04-14 DIAGNOSIS — L67.0 TRICHORRHEXIS NODOSA: ICD-10-CM

## 2021-04-14 PROBLEM — D48.5 NEOPLASM OF UNCERTAIN BEHAVIOR OF SKIN: Status: ACTIVE | Noted: 2021-04-14

## 2021-04-14 PROCEDURE — OTHER REASSURANCE: OTHER

## 2021-04-14 PROCEDURE — OTHER HAIR CLIPPING FOR HISTOLOGY: OTHER

## 2021-04-14 PROCEDURE — 96902 MCRSCP XM HAIR PLUCK/CLIPPED: CPT

## 2021-04-14 PROCEDURE — OTHER PRESCRIPTION: OTHER

## 2021-04-14 PROCEDURE — 99213 OFFICE O/P EST LOW 20 MIN: CPT | Mod: 25

## 2021-04-14 PROCEDURE — OTHER HAIR PREP: OTHER

## 2021-04-14 RX ORDER — POLY-UREAURETHANE 16 %
NAIL FILM SOLUTION (ML) TOPICAL
Qty: 1 | Refills: 3 | Status: ERX | COMMUNITY
Start: 2021-04-14

## 2021-04-14 ASSESSMENT — LOCATION SIMPLE DESCRIPTION DERM
LOCATION SIMPLE: POSTERIOR SCALP
LOCATION SIMPLE: LEFT INDEX FINGERNAIL
LOCATION SIMPLE: HAIR

## 2021-04-14 ASSESSMENT — LOCATION DETAILED DESCRIPTION DERM
LOCATION DETAILED: LEFT INDEX FINGERNAIL
LOCATION DETAILED: MID-OCCIPITAL SCALP
LOCATION DETAILED: HAIR

## 2021-04-14 ASSESSMENT — LOCATION ZONE DERM
LOCATION ZONE: SCALP
LOCATION ZONE: FINGERNAIL

## 2021-04-14 NOTE — HPI: HAIR LOSS
Previous Labs: Yes
How Did The Hair Loss Occur?: gradual in onset
How Severe Is Your Hair Loss?: moderate
Additional History: Actual brittle hair in back of head.

## 2021-04-14 NOTE — PROCEDURE: HAIR CLIPPING FOR HISTOLOGY
Render Path Notes In Note?: No
Billing Type: Third-Party Bill
Path Notes (To The Dermatopathologist): Hair is kinky and dry on nuchal/ occipital scalp area.  No flat iron or processing other than hair dye.  Hair in frontal scalp normal.  Sleeps on back.  Used dermatoscope to examine I did not see fraying or nodules. Thank you!!!!
Detail Level: Detailed

## 2021-06-21 LAB — HBA1C MFR BLD: 6.3 %

## 2021-06-24 ENCOUNTER — OFFICE VISIT (OUTPATIENT)
Dept: PRIMARY CARE | Facility: CLINIC | Age: 61
End: 2021-06-24
Payer: COMMERCIAL

## 2021-06-24 VITALS
RESPIRATION RATE: 17 BRPM | WEIGHT: 256 LBS | DIASTOLIC BLOOD PRESSURE: 82 MMHG | OXYGEN SATURATION: 97 % | HEART RATE: 84 BPM | SYSTOLIC BLOOD PRESSURE: 124 MMHG | BODY MASS INDEX: 38.8 KG/M2 | TEMPERATURE: 98.2 F | HEIGHT: 68 IN

## 2021-06-24 DIAGNOSIS — E66.01 MORBID OBESITY DUE TO EXCESS CALORIES (CMS/HCC): ICD-10-CM

## 2021-06-24 DIAGNOSIS — I10 ESSENTIAL HYPERTENSION, BENIGN: ICD-10-CM

## 2021-06-24 DIAGNOSIS — E11.65 TYPE 2 DIABETES MELLITUS WITH HYPERGLYCEMIA, WITHOUT LONG-TERM CURRENT USE OF INSULIN (CMS/HCC): Primary | ICD-10-CM

## 2021-06-24 DIAGNOSIS — Z12.11 COLON CANCER SCREENING: ICD-10-CM

## 2021-06-24 PROCEDURE — 3079F DIAST BP 80-89 MM HG: CPT | Performed by: NURSE PRACTITIONER

## 2021-06-24 PROCEDURE — 3008F BODY MASS INDEX DOCD: CPT | Performed by: NURSE PRACTITIONER

## 2021-06-24 PROCEDURE — 3074F SYST BP LT 130 MM HG: CPT | Performed by: NURSE PRACTITIONER

## 2021-06-24 PROCEDURE — 99214 OFFICE O/P EST MOD 30 MIN: CPT | Performed by: NURSE PRACTITIONER

## 2021-06-24 RX ORDER — METFORMIN HYDROCHLORIDE 500 MG/1
TABLET, EXTENDED RELEASE ORAL
Qty: 360 TABLET | Refills: 1 | Status: SHIPPED | OUTPATIENT
Start: 2021-06-24 | End: 2022-01-03

## 2021-06-24 ASSESSMENT — ENCOUNTER SYMPTOMS
RESPIRATORY NEGATIVE: 1
CONSTITUTIONAL NEGATIVE: 1
DIABETIC ASSOCIATED SYMPTOMS: 0
CARDIOVASCULAR NEGATIVE: 1

## 2021-06-24 NOTE — PROGRESS NOTES
[facility] at [address]  [place] [address]  [phone]  Fax [phone]      Patient ID: Anh Rowan                              : 1960    Visit Date: 2021    Chief Complaint: Med Management         Patient ID: Anh Rowan is a 61 y.o. female.    Patient Active Problem List   Diagnosis   • Essential hypertension, benign   • Fatty liver   • Morbid obesity due to excess calories (CMS/HCC)   • Marginal zone lymphoma (CMS/HCC)   • Type 2 diabetes mellitus with hyperglycemia, without long-term current use of insulin (CMS/HCC)         Current Outpatient Medications:   •  ACCU-CHEK FASTCLIX LANCET DRUM misc, USE TO TEST BLOOD SUGAR TWICE A DAY, Disp: 100 each, Rfl: 0  •  blood sugar diagnostic (ACCU-CHEK GUIDE TEST STRIPS) strip, 1 strip 2 (two) times a day., Disp: 200 strip, Rfl: 1  •  blood-glucose meter (ACCU-CHEK GUIDE GLUCOSE METER) misc, Test bid, Disp: 1 each, Rfl: 0  •  metFORMIN XR (GLUCOPHAGE-XR) 500 mg 24 hr tablet, TAKE 2 TABLETS BY MOUTH TWICE A DAY, Disp: 360 tablet, Rfl: 1    Allergies   Allergen Reactions   • Clarithromycin Rash       Social History     Tobacco Use   • Smoking status: Never Smoker   • Smokeless tobacco: Never Used   Substance Use Topics   • Alcohol use: Not on file   • Drug use: Not on file       Health Maintenance   Topic Date Due   • Influenza Vaccine (Season Ended) 2021 (Originally 2021)   • COVID-19 Vaccine (1) 2021 (Originally 1973)   • Colonoscopy  10/15/2021 (Originally 2011)   • Zoster Vaccine (1 of 2) 10/16/2021 (Originally 2011)   • Annual Dilated Retinal Exam  10/15/2021   • Diabetic Foot Exam  10/16/2021   • Urine Protein Screening  2021   • Hemoglobin A1C  2022   • Mammogram  2022   • Cervical Cancer Screening  2023   • Hepatitis C Screening  Completed   • Meningococcal ACWY  Aged Out   • HIB Vaccines  Aged Out   • IPV Vaccines   "Aged Out   • HPV Vaccines  Aged Out   • DTaP, Tdap, and Td Vaccines  Discontinued   • Varicella Vaccines  Discontinued   • HIV Screening  Discontinued   • Pneumococcal  Discontinued       HPI  DM follow up.  Recent labs.    Diabetes  She presents for her follow-up diabetic visit. She has type 2 diabetes mellitus. Her disease course has been stable. There are no hypoglycemic associated symptoms. There are no diabetic associated symptoms. Symptoms are stable. Current diabetic treatment includes oral agent (monotherapy). She is compliant with treatment all of the time. She is following a generally healthy diet. Meal planning includes avoidance of concentrated sweets. She participates in exercise three times a week. There is no change in her home blood glucose trend. An ACE inhibitor/angiotensin II receptor blocker is not being taken. She sees a podiatrist.Eye exam is current.       The following have been reviewed and updated as appropriate in this visit:  Allergies  Meds  Problems         Review of System  Review of Systems   Constitutional: Negative.    Respiratory: Negative.    Cardiovascular: Negative.        Objective     Vitals  Vitals:    06/24/21 0959   BP: 124/82   Pulse: 84   Resp: 17   Temp: 36.8 °C (98.2 °F)   TempSrc: Oral   SpO2: 97%   Weight: 116 kg (256 lb)   Height: 1.715 m (5' 7.5\")     Body mass index is 39.5 kg/m².    Physical Exam  Physical Exam  Vitals reviewed.   Constitutional:       General: She is not in acute distress.     Appearance: Normal appearance. She is not diaphoretic.   Neck:      Vascular: No carotid bruit.   Cardiovascular:      Rate and Rhythm: Normal rate and regular rhythm.      Heart sounds: No murmur heard.   No friction rub. No gallop.    Pulmonary:      Effort: Pulmonary effort is normal.      Breath sounds: Normal breath sounds. No wheezing, rhonchi or rales.   Musculoskeletal:      Cervical back: Neck supple. No rigidity or tenderness.      Right lower leg: No edema.    "   Left lower leg: No edema.   Lymphadenopathy:      Cervical: No cervical adenopathy.   Neurological:      Mental Status: She is alert and oriented to person, place, and time.         Assessment/Plan     Problem List Items Addressed This Visit     Essential hypertension, benign     BP stable today.  Discussed ACE.  Pt declines for now.         Relevant Orders    CBC and Differential    Comprehensive metabolic panel    TSH 3rd Generation    Urinalysis with Reflex Culture (ED and Outpatient only)    Morbid obesity due to excess calories (CMS/[facility])     BMI down to 39.  Continue weight loss efforts and regular exercise to lower BMI.         Type 2 diabetes mellitus with hyperglycemia, without long-term current use of insulin (CMS/[facility]) - Primary     A1C 6.3  Excellent control.  Continue Metformin 2 BID  Follow up 6 mo  Repeat full labs before next visit.  Routine eye exam.  Sees podiatry Dr Dalton.         Relevant Medications    metFORMIN XR (GLUCOPHAGE-XR) 500 mg 24 hr tablet    Other Relevant Orders    Lipid panel    Hemoglobin A1c    Microalbumin/Creatinine Ur Random      Other Visit Diagnoses     Colon cancer screening        Relevant Orders    Ambulatory referral to Gastroenterology      Referred to GI for baseline colonoscopy.        HAYDER James  6/24/2021

## 2021-06-24 NOTE — ASSESSMENT & PLAN NOTE
A1C 6.3  Excellent control.  Continue Metformin 2 BID  Follow up 6 mo  Repeat full labs before next visit.  Routine eye exam.  Sees podiatry Dr Dalton.

## 2021-07-07 DIAGNOSIS — E11.65 TYPE 2 DIABETES MELLITUS WITH HYPERGLYCEMIA, WITHOUT LONG-TERM CURRENT USE OF INSULIN (CMS/HCC): ICD-10-CM

## 2021-07-07 RX ORDER — LANCETS
EACH MISCELLANEOUS
Qty: 100 EACH | Refills: 2 | Status: SHIPPED | OUTPATIENT
Start: 2021-07-07 | End: 2021-12-29 | Stop reason: SDUPTHER

## 2021-07-07 NOTE — TELEPHONE ENCOUNTER
Medicine Refill Request    Last Office Visit: 6/24/2021  Last Telemedicine Visit: 8/22/2020 Gail Ricks MD    Next Office Visit: 12/21/2021  Next Telemedicine Visit: Visit date not found         Current Outpatient Medications:   •  ACCU-CHEK FASTCLIX LANCET DRUM misc, USE TO TEST BLOOD SUGAR TWICE A DAY, Disp: 100 each, Rfl: 0  •  blood sugar diagnostic (ACCU-CHEK GUIDE TEST STRIPS) strip, 1 strip 2 (two) times a day., Disp: 200 strip, Rfl: 1  •  blood-glucose meter (ACCU-CHEK GUIDE GLUCOSE METER) misc, Test bid, Disp: 1 each, Rfl: 0  •  metFORMIN XR (GLUCOPHAGE-XR) 500 mg 24 hr tablet, TAKE 2 TABLETS BY MOUTH TWICE A DAY, Disp: 360 tablet, Rfl: 1      BP Readings from Last 3 Encounters:   06/24/21 124/82   03/05/21 140/80   12/09/20 124/80       Recent Lab results:  No results found for: CHOL, No results found for: HDL, No results found for: LDLCALC, No results found for: TRIG     Lab Results   Component Value Date    GLUCOSE 161 (H) 12/05/2020   ,   Lab Results   Component Value Date    HGBA1C 6.3 06/21/2021         Lab Results   Component Value Date    CREATININE 0.72 12/05/2020       Lab Results   Component Value Date    TSH Comment 03/23/2019

## 2021-08-02 ENCOUNTER — TELEPHONE (OUTPATIENT)
Dept: PRIMARY CARE | Facility: CLINIC | Age: 61
End: 2021-08-02

## 2021-08-02 DIAGNOSIS — Z12.31 VISIT FOR SCREENING MAMMOGRAM: Primary | ICD-10-CM

## 2021-08-02 NOTE — LETTER
[facility]  [facility] in [place]  [address] [place] [address]  [address]  [facility] [address]  Phone:  [phone]  Fax:  [phone]    [facility] Account Number - [id number]  [facility] Account Number - [id number]      Patient:    Anh Rowan MRN:  [id number]   [address] [facility]  :  1960  Sex:  F   Phone: [phone]      INSURANCE PAYOR PLAN GROUP # SUBSCRIBER ID   Primary:   [employer] [id number]  Insurance: N/A  Plan Name: N/A [id number]     Order Date:  Aug 2, 2021             BI SCREENING MAMMOGRAM BILATERAL(TOMOSYNTHESIS)  CPT: 92979   (Order ID: [id number])   Diagnosis:  Visit for screening mammogram (Z12.31)   Priority:  Routine  Comments: FAST TRACK  Release to patient: Immediate                       Authorized by: Meghan Cordova CRNP   (NPI: [id number])    E-Signature: Meghan Cordova CRNP                       To schedule radiology appointments with [facility]     call Central Scheduling at [phone]  To schedule PET scans call PET Scheduling [phone]    To schedule Low-Dose CT Scan for Lung Cancer Screening  call [phone]        Lab draws do not require an appointment

## 2021-08-02 NOTE — TELEPHONE ENCOUNTER
Gave patient Cat's message.  Patient said they have been paying for it in the past.  Faxed over mammo order to [facility].

## 2021-08-02 NOTE — TELEPHONE ENCOUNTER
Patient is asking if the mammogram order can have 'fast track' on it.  With 'fast track' on the order they will review right after the mammogram.  For the past 3 years patient says she gets a call the following day saying they found something on the film and she needs to go back.  Saying 'fast track' will eliminate this.    Pt would like order to be faxed to Nicole Solis - [phone] and notified.

## 2021-08-02 NOTE — TELEPHONE ENCOUNTER
Please let pt know I ordered and it says fast track I have sent it through her portal but please make sure she is aware we can not guarantee her insurance will cover at 100 %

## 2021-09-02 ENCOUNTER — APPOINTMENT (OUTPATIENT)
Dept: URBAN - METROPOLITAN AREA CLINIC 200 | Age: 61
Setting detail: DERMATOLOGY
End: 2021-09-06

## 2021-09-02 DIAGNOSIS — Z11.52 ENCOUNTER FOR SCREENING FOR COVID-19: ICD-10-CM

## 2021-09-02 DIAGNOSIS — L65.9 NONSCARRING HAIR LOSS, UNSPECIFIED: ICD-10-CM

## 2021-09-02 PROCEDURE — OTHER ORDER TESTS: OTHER

## 2021-09-02 PROCEDURE — OTHER PRESCRIPTION MEDICATION MANAGEMENT: OTHER

## 2021-09-02 PROCEDURE — OTHER SCREENING FOR COVID-19: OTHER

## 2021-09-02 PROCEDURE — OTHER COUNSELING: OTHER

## 2021-09-02 PROCEDURE — OTHER PRESCRIPTION: OTHER

## 2021-09-02 PROCEDURE — 99213 OFFICE O/P EST LOW 20 MIN: CPT

## 2021-09-02 RX ORDER — FINASTERIDE 5 MG/1
TABLET, FILM COATED ORAL
Qty: 15 | Refills: 3 | Status: ERX | COMMUNITY
Start: 2021-09-02

## 2021-09-02 ASSESSMENT — LOCATION SIMPLE DESCRIPTION DERM
LOCATION SIMPLE: ANTERIOR SCALP
LOCATION SIMPLE: ABDOMEN
LOCATION SIMPLE: LEFT SCALP
LOCATION SIMPLE: POSTERIOR SCALP

## 2021-09-02 ASSESSMENT — LOCATION DETAILED DESCRIPTION DERM
LOCATION DETAILED: POSTERIOR MID-PARIETAL SCALP
LOCATION DETAILED: PERIUMBILICAL SKIN
LOCATION DETAILED: LEFT MEDIAL FRONTAL SCALP
LOCATION DETAILED: MID-FRONTAL SCALP

## 2021-09-02 ASSESSMENT — LOCATION ZONE DERM
LOCATION ZONE: SCALP
LOCATION ZONE: TRUNK

## 2021-09-02 NOTE — PROCEDURE: PRESCRIPTION MEDICATION MANAGEMENT
Render In Strict Bullet Format?: No
Plan: T/c If not improving in 2 months on finasteride, will try spironolactone and minoxidil.
Initiate Treatment: finasteride 5 mg tablet
Detail Level: Detailed

## 2021-09-29 ENCOUNTER — TELEMEDICINE (OUTPATIENT)
Dept: PRIMARY CARE | Facility: CLINIC | Age: 61
End: 2021-09-29
Payer: COMMERCIAL

## 2021-09-29 VITALS — HEIGHT: 65 IN | WEIGHT: 247 LBS | BODY MASS INDEX: 41.15 KG/M2

## 2021-09-29 DIAGNOSIS — E11.65 TYPE 2 DIABETES MELLITUS WITH HYPERGLYCEMIA, WITHOUT LONG-TERM CURRENT USE OF INSULIN (CMS/HCC): ICD-10-CM

## 2021-09-29 DIAGNOSIS — J02.9 PHARYNGITIS, UNSPECIFIED ETIOLOGY: Primary | ICD-10-CM

## 2021-09-29 DIAGNOSIS — E66.01 MORBID OBESITY DUE TO EXCESS CALORIES (CMS/HCC): ICD-10-CM

## 2021-09-29 PROCEDURE — 3008F BODY MASS INDEX DOCD: CPT | Performed by: FAMILY MEDICINE

## 2021-09-29 PROCEDURE — 99214 OFFICE O/P EST MOD 30 MIN: CPT | Mod: 95 | Performed by: FAMILY MEDICINE

## 2021-09-29 RX ORDER — MELOXICAM 15 MG/1
15 TABLET ORAL DAILY
Qty: 7 TABLET | Refills: 1 | Status: SHIPPED | OUTPATIENT
Start: 2021-09-29 | End: 2021-12-29

## 2021-09-29 RX ORDER — METHYLPREDNISOLONE 4 MG/1
TABLET ORAL
Qty: 21 TABLET | Refills: 0 | Status: SHIPPED | OUTPATIENT
Start: 2021-09-29 | End: 2021-10-06

## 2021-09-29 ASSESSMENT — ENCOUNTER SYMPTOMS
DYSPHORIC MOOD: 0
CONSTIPATION: 0
EYE PAIN: 0
FATIGUE: 0
CHILLS: 0
HEADACHES: 0
FEVER: 0
POLYDIPSIA: 1
DIZZINESS: 0
ABDOMINAL DISTENTION: 0
NUMBNESS: 0
SHORTNESS OF BREATH: 0
RHINORRHEA: 0
HALLUCINATIONS: 0
SINUS PAIN: 0
COUGH: 1
DIARRHEA: 0

## 2021-09-29 NOTE — PROGRESS NOTES
Verification of Patient Location:  The patient affirms they are currently located in the following state: [place]    Request for Consent:    Audio and Video Encounter   Hello, my name is Nicole LOCKHART DO Carlos.  Before we proceed, can you please verify your identification by telling me your full name and date of birth?  Can you tell me who is in the room with you?    You and I are about to have a telemedicine check-in or visit because you have requested it.  This is a live video-conference.  I am a real person, speaking to you in real time.  There is no one else with me on the video-conference.  However, when we use ([employer]eor, [employer], etc) it is important for you to know that the video-conference may not be secure or private.  I am not recording this conversation and I am asking you not to record it.  This telemedicine visit will be billed to your health insurance or you, if you are self-insured.  You understand you will be responsible for any copayments or coinsurances that apply to your telemedicine visit.  Communication platform used for this encounter:  [employer] Video Visit (with Zoom integration)     Before starting our telemedicine visit, I am required to get your consent for this virtual check-in or visit by telemedicine. Do you consent?      Patient Response to Request for Consent:  Yes      Visit Documentation:  Subjective     Patient ID: Anh Rowan is a 61 y.o. female.  1960      Sore throat, severe, unable to swallow.  No fever, no change in taste or smell.      The following have been reviewed and updated as appropriate in this visit:  Tobacco  Allergies  Meds  Problems  Med Hx  Surg Hx  Fam Hx       Review of Systems   Constitutional: Negative for chills, fatigue and fever.   HENT: Negative for congestion, postnasal drip, rhinorrhea and sinus pain.    Eyes: Negative for pain.   Respiratory: Positive for cough (slight). Negative for shortness of breath.    Gastrointestinal: Negative  for abdominal distention, constipation and diarrhea.   Endocrine: Positive for polydipsia.   Neurological: Negative for dizziness, numbness and headaches.   Psychiatric/Behavioral: Negative for dysphoric mood and hallucinations.         Assessment/Plan   Diagnoses and all orders for this visit:    Pharyngitis, unspecified etiology (Primary)  Assessment & Plan:  Medrol Dose Pack, as dir, Magic mouthwash, finish abx      Morbid obesity due to excess calories (CMS/[facility])    Type 2 diabetes mellitus with hyperglycemia, without long-term current use of insulin (CMS/[facility])    Other orders  -     methylPREDNISolone (MEDROL DOSEPACK) 4 mg tablet; Follow package directions.  -     magic mouthwash (BENADRYL-MAALOX-LIDOCAINE); Swish and spit 15 mL every 4 (four) hours as needed (sore throat).  -     meloxicam (MOBIC) 15 mg tablet; Take 1 tablet (15 mg total) by mouth daily.      Time Spent:  I spent 24 minutes on this date of service performing the following activities:

## 2021-10-30 DIAGNOSIS — E11.65 TYPE 2 DIABETES MELLITUS WITH HYPERGLYCEMIA, WITHOUT LONG-TERM CURRENT USE OF INSULIN (CMS/HCC): ICD-10-CM

## 2021-11-01 RX ORDER — BLOOD SUGAR DIAGNOSTIC
STRIP MISCELLANEOUS
Qty: 200 STRIP | Refills: 1 | Status: SHIPPED | OUTPATIENT
Start: 2021-11-01 | End: 2021-12-29 | Stop reason: SDUPTHER

## 2021-11-01 NOTE — TELEPHONE ENCOUNTER
Medicine Refill Request    Last Office Visit: 6/24/2021  Last Telemedicine Visit: 9/29/2021 Nicole Gonzalez DO    Next Office Visit: 12/21/2021  Next Telemedicine Visit: Visit date not found         Current Outpatient Medications:   •  ACCU-CHEK FASTCLIX LANCET DRUM misc, USE TO TEST BLOOD SUGAR TWICE A DAY, Disp: 100 each, Rfl: 2  •  blood sugar diagnostic (ACCU-CHEK GUIDE TEST STRIPS) strip, 1 strip 2 (two) times a day., Disp: 200 strip, Rfl: 1  •  blood-glucose meter (ACCU-CHEK GUIDE GLUCOSE METER) misc, Test bid, Disp: 1 each, Rfl: 0  •  magic mouthwash (BENADRYL-MAALOX-LIDOCAINE), Swish and spit 15 mL every 4 (four) hours as needed (sore throat)., Disp: 180 mL, Rfl: 1  •  meloxicam (MOBIC) 15 mg tablet, Take 1 tablet (15 mg total) by mouth daily., Disp: 7 tablet, Rfl: 1  •  metFORMIN XR (GLUCOPHAGE-XR) 500 mg 24 hr tablet, TAKE 2 TABLETS BY MOUTH TWICE A DAY, Disp: 360 tablet, Rfl: 1      BP Readings from Last 3 Encounters:   06/24/21 124/82   03/05/21 140/80   12/09/20 124/80       Recent Lab results:  No results found for: CHOL, No results found for: HDL, No results found for: LDLCALC, No results found for: TRIG     Lab Results   Component Value Date    GLUCOSE 161 (H) 12/05/2020   ,   Lab Results   Component Value Date    HGBA1C 6.3 06/21/2021         Lab Results   Component Value Date    CREATININE 0.72 12/05/2020       Lab Results   Component Value Date    TSH Comment 03/23/2019

## 2021-12-06 ENCOUNTER — APPOINTMENT (OUTPATIENT)
Dept: URBAN - METROPOLITAN AREA CLINIC 200 | Age: 61
Setting detail: DERMATOLOGY
End: 2021-12-13

## 2021-12-06 DIAGNOSIS — Z11.52 ENCOUNTER FOR SCREENING FOR COVID-19: ICD-10-CM

## 2021-12-06 DIAGNOSIS — L71.8 OTHER ROSACEA: ICD-10-CM

## 2021-12-06 DIAGNOSIS — C85.8 OTHER SPECIFIED TYPES OF NON-HODGKIN LYMPHOMA: ICD-10-CM

## 2021-12-06 PROBLEM — C85.80 OTHER SPECIFIED TYPES OF NON-HODGKIN LYMPHOMA, UNSPECIFIED SITE: Status: ACTIVE | Noted: 2021-12-06

## 2021-12-06 PROBLEM — Z85.79 PERSONAL HISTORY OF OTHER MALIGNANT NEOPLASMS OF LYMPHOID, HEMATOPOIETIC AND RELATED TISSUES: Status: ACTIVE | Noted: 2021-12-06

## 2021-12-06 PROCEDURE — OTHER REASSURANCE: OTHER

## 2021-12-06 PROCEDURE — 99213 OFFICE O/P EST LOW 20 MIN: CPT

## 2021-12-06 PROCEDURE — OTHER SCREENING FOR COVID-19: OTHER

## 2021-12-06 PROCEDURE — OTHER PRESCRIPTION: OTHER

## 2021-12-06 PROCEDURE — OTHER PRESCRIPTION MEDICATION MANAGEMENT: OTHER

## 2021-12-06 RX ORDER — METRONIDAZOLE 7.5 MG/G
GEL TOPICAL
Qty: 45 | Refills: 3 | Status: ERX | COMMUNITY
Start: 2021-12-06

## 2021-12-06 ASSESSMENT — LOCATION SIMPLE DESCRIPTION DERM
LOCATION SIMPLE: LEFT SCALP
LOCATION SIMPLE: RIGHT CHEEK
LOCATION SIMPLE: LEFT CHEEK
LOCATION SIMPLE: ABDOMEN

## 2021-12-06 ASSESSMENT — LOCATION ZONE DERM
LOCATION ZONE: FACE
LOCATION ZONE: SCALP
LOCATION ZONE: TRUNK

## 2021-12-06 ASSESSMENT — LOCATION DETAILED DESCRIPTION DERM
LOCATION DETAILED: RIGHT CENTRAL MALAR CHEEK
LOCATION DETAILED: PERIUMBILICAL SKIN
LOCATION DETAILED: LEFT CENTRAL MALAR CHEEK
LOCATION DETAILED: LEFT MEDIAL FRONTAL SCALP
LOCATION DETAILED: LEFT INFERIOR CENTRAL MALAR CHEEK

## 2021-12-29 ENCOUNTER — OFFICE VISIT (OUTPATIENT)
Dept: PRIMARY CARE | Facility: CLINIC | Age: 61
End: 2021-12-29
Payer: COMMERCIAL

## 2021-12-29 VITALS
OXYGEN SATURATION: 97 % | WEIGHT: 262 LBS | HEART RATE: 96 BPM | HEIGHT: 68 IN | DIASTOLIC BLOOD PRESSURE: 82 MMHG | SYSTOLIC BLOOD PRESSURE: 130 MMHG | BODY MASS INDEX: 39.71 KG/M2

## 2021-12-29 DIAGNOSIS — E66.01 MORBID OBESITY DUE TO EXCESS CALORIES (CMS/HCC): ICD-10-CM

## 2021-12-29 DIAGNOSIS — I10 ESSENTIAL HYPERTENSION, BENIGN: ICD-10-CM

## 2021-12-29 DIAGNOSIS — Z12.11 COLON CANCER SCREENING: ICD-10-CM

## 2021-12-29 DIAGNOSIS — C85.80 MARGINAL ZONE LYMPHOMA (CMS/HCC): ICD-10-CM

## 2021-12-29 DIAGNOSIS — E11.65 TYPE 2 DIABETES MELLITUS WITH HYPERGLYCEMIA, WITHOUT LONG-TERM CURRENT USE OF INSULIN (CMS/HCC): Primary | ICD-10-CM

## 2021-12-29 PROBLEM — J02.9 PHARYNGITIS: Status: RESOLVED | Noted: 2021-09-29 | Resolved: 2021-12-29

## 2021-12-29 PROBLEM — H90.3 ASYMMETRICAL SENSORINEURAL HEARING LOSS: Status: ACTIVE | Noted: 2021-12-29

## 2021-12-29 PROCEDURE — 3079F DIAST BP 80-89 MM HG: CPT | Performed by: NURSE PRACTITIONER

## 2021-12-29 PROCEDURE — 3075F SYST BP GE 130 - 139MM HG: CPT | Performed by: NURSE PRACTITIONER

## 2021-12-29 PROCEDURE — 3008F BODY MASS INDEX DOCD: CPT | Performed by: NURSE PRACTITIONER

## 2021-12-29 PROCEDURE — 99214 OFFICE O/P EST MOD 30 MIN: CPT | Performed by: NURSE PRACTITIONER

## 2021-12-29 RX ORDER — LANCETS
EACH MISCELLANEOUS
Qty: 100 EACH | Refills: 1 | Status: SHIPPED | OUTPATIENT
Start: 2021-12-29 | End: 2025-03-18 | Stop reason: SDUPTHER

## 2021-12-29 RX ORDER — BLOOD SUGAR DIAGNOSTIC
STRIP MISCELLANEOUS
Qty: 100 STRIP | Refills: 1 | Status: SHIPPED | OUTPATIENT
Start: 2021-12-29 | End: 2022-01-14 | Stop reason: SDUPTHER

## 2021-12-29 RX ORDER — ORAL SEMAGLUTIDE 3 MG/1
3 TABLET ORAL DAILY
Qty: 30 TABLET | Refills: 0 | Status: SHIPPED | OUTPATIENT
Start: 2021-12-29 | End: 2022-03-30

## 2021-12-29 RX ORDER — FINASTERIDE 5 MG/1
TABLET, FILM COATED ORAL
COMMUNITY
Start: 2021-12-01 | End: 2021-12-29

## 2021-12-29 ASSESSMENT — PATIENT HEALTH QUESTIONNAIRE - PHQ9: SUM OF ALL RESPONSES TO PHQ9 QUESTIONS 1 & 2: 0

## 2021-12-29 ASSESSMENT — ENCOUNTER SYMPTOMS
PALPITATIONS: 0
HEADACHES: 0
HYPERTENSION: 1
BLURRED VISION: 0
SHORTNESS OF BREATH: 0
DIABETIC ASSOCIATED SYMPTOMS: 0

## 2021-12-29 NOTE — ASSESSMENT & PLAN NOTE
Results for MINERVA PIERRE (MRN [id number]) as of 12/29/2021 07:27   Ref. Range 12/18/2021 07:42   WBC Latest Ref Range: 3.4 - 10.8 x10E3/uL 8.9   RBC Latest Ref Range: 3.77 - 5.28 x10E6/uL 4.55   Hemoglobin Latest Ref Range: 11.1 - 15.9 g/dL 12.7   Hematocrit Latest Ref Range: 34.0 - 46.6 % 37.9   MCV Latest Ref Range: 79 - 97 fL 83   MCH Latest Ref Range: 26.6 - 33.0 pg 27.9   MCHC Latest Ref Range: 31.5 - 35.7 g/dL 33.5   RDW Latest Ref Range: 11.7 - 15.4 % 13.1   Platelets Latest Ref Range: 150 - 450 x10E3/uL 301     Dr Matias hogan.

## 2021-12-29 NOTE — ASSESSMENT & PLAN NOTE
Weight is up from last time. Using Kayy Ulloa.  Frustrated.  Add Rybelsus daily  Follow up 3 months.

## 2021-12-29 NOTE — ASSESSMENT & PLAN NOTE
Results for MINERVA PIERRE (MRN [id number]) as of 12/29/2021 07:27   Ref. Range 12/18/2021 07:42   Hemoglobin A1C Latest Ref Range: 4.8 - 5.6 % 5.9 (H)     Very stable  Adding Rybelsus  For weight loss help.  Follow up 3 mo.  Check A1C 3 months.

## 2021-12-29 NOTE — PROGRESS NOTES
[facility] at [address]  [facility] [address]  [phone]  Fax [phone]      Patient ID: Anh Rowan                              : 1960    Visit Date: 2021    Chief Complaint: Diabetes         Patient ID: Anh Rowan is a 61 y.o. female.    Patient Active Problem List   Diagnosis   • Essential hypertension, benign   • Fatty liver   • Morbid obesity due to excess calories (CMS/HCC)   • Marginal zone lymphoma (CMS/HCC)   • Type 2 diabetes mellitus with hyperglycemia, without long-term current use of insulin (CMS/HCC)   • Asymmetrical sensorineural hearing loss         Current Outpatient Medications:   •  blood sugar diagnostic (ACCU-CHEK GUIDE TEST STRIPS) strip, Test 1-2 times daily, Disp: 100 strip, Rfl: 1  •  blood-glucose meter (ACCU-CHEK GUIDE GLUCOSE METER) misc, Test bid, Disp: 1 each, Rfl: 0  •  lancets (ACCU-CHEK FASTCLIX LANCET DRUM) misc, Test blood sugar one to two times daily, Disp: 100 each, Rfl: 1  •  metFORMIN XR (GLUCOPHAGE-XR) 500 mg 24 hr tablet, TAKE 2 TABLETS BY MOUTH TWICE A DAY, Disp: 360 tablet, Rfl: 1  •  semaglutide (RYBELSUS) 3 mg tablet, Take 1 tablet (3 mg total) by mouth daily., Disp: 30 tablet, Rfl: 0    Allergies   Allergen Reactions   • Clarithromycin Rash       Social History     Tobacco Use   • Smoking status: Never Smoker   • Smokeless tobacco: Never Used   Substance Use Topics   • Alcohol use: Not on file   • Drug use: Not on file       Health Maintenance   Topic Date Due   • COVID-19 Vaccine (1) Never done   • Diabetic Foot Exam  2022 (Originally 10/16/2021)   • Colonoscopy  2022 (Originally 2011)   • Zoster Vaccine (1 of 2) 2022 (Originally 2011)   • Influenza Vaccine (1) 2022 (Originally 2021)   • Annual Dilated Retinal Exam  2022   • Hemoglobin A1C  2022   • Urine Protein Screening  2022   • Cervical Cancer Screening   03/07/2023   • Breast Cancer Screening  08/09/2023   • Hepatitis C Screening  Completed   • Meningococcal ACWY  Aged Out   • HIB Vaccines  Aged Out   • IPV Vaccines  Aged Out   • HPV Vaccines  Aged Out   • DTaP, Tdap, and Td Vaccines  Discontinued   • HIV Screening  Discontinued   • Pneumococcal  Discontinued       HPI  Routine med check.    Diabetes  She presents for her follow-up diabetic visit. She has type 2 diabetes mellitus. Her disease course has been stable. Pertinent negatives for hypoglycemia include no headaches. There are no diabetic associated symptoms. Pertinent negatives for diabetes include no blurred vision and no chest pain. Her weight is stable. She is following a generally healthy diet. Meal planning includes avoidance of concentrated sweets. She participates in exercise daily. She sees a podiatrist.Eye exam is current.   Hypertension  This is a chronic problem. The current episode started more than 1 year ago. The problem is controlled. Pertinent negatives include no blurred vision, chest pain, headaches, palpitations, peripheral edema or shortness of breath. There are no associated agents to hypertension. Past treatments include lifestyle changes. The current treatment provides significant improvement. There are no compliance problems.  There is no history of chronic renal disease.   Hyperlipidemia  This is a new problem. This is a new diagnosis. Lipid results: very borderline high. Exacerbating diseases include diabetes and obesity. She has no history of chronic renal disease, hypothyroidism, liver disease or nephrotic syndrome. There are no known factors aggravating her hyperlipidemia. Pertinent negatives include no chest pain or shortness of breath. Current antihyperlipidemic treatment includes diet change and exercise. There are no compliance problems.        The following have been reviewed and updated as appropriate in this visit:          Review of System  Review of Systems   Eyes:  "Negative for blurred vision.   Respiratory: Negative for shortness of breath.    Cardiovascular: Negative for chest pain and palpitations.   Neurological: Negative for headaches.       Objective     Vitals  Vitals:    12/29/21 1557   BP: 130/82   Pulse: 96   SpO2: 97%   Weight: 119 kg (262 lb)   Height: 1.715 m (5' 7.5\")     Body mass index is 40.43 kg/m².    Physical Exam  Physical Exam  Vitals reviewed.   Constitutional:       General: She is not in acute distress.     Appearance: Normal appearance. She is not diaphoretic.   Cardiovascular:      Rate and Rhythm: Normal rate and regular rhythm.      Heart sounds: No murmur heard.    No friction rub. No gallop.   Pulmonary:      Effort: Pulmonary effort is normal.      Breath sounds: Normal breath sounds. No wheezing, rhonchi or rales.   Neurological:      Mental Status: She is alert and oriented to person, place, and time.         Assessment/Plan     Problem List Items Addressed This Visit     Essential hypertension, benign     Results for MINERVA PIERRE (MRN [id number]) as of 12/29/2021 07:27   Ref. Range 12/18/2021 07:42   Sodium Latest Ref Range: 134 - 144 mmol/L 144   Potassium Latest Ref Range: 3.5 - 5.2 mmol/L 4.8   Chloride Latest Ref Range: 96 - 106 mmol/L 102   CO2 Latest Ref Range: 20 - 29 mmol/L 28   BUN Latest Ref Range: 8 - 27 mg/dL 14   Creatinine Latest Ref Range: 0.57 - 1.00 mg/dL 0.66   Glucose Latest Ref Range: 65 - 99 mg/dL 113 (H)   Calcium Latest Ref Range: 8.7 - 10.3 mg/dL 9.9   AST (SGOT) Latest Ref Range: 0 - 40 IU/L 19   ALT (SGPT) Latest Ref Range: 0 - 32 IU/L 19   Alkaline Phosphatase Latest Ref Range: 44 - 121 IU/L 74   Total Protein Latest Ref Range: 6.0 - 8.5 g/dL 7.2   Albumin Latest Ref Range: 3.8 - 4.9 g/dL 4.6   Bilirubin, Total Latest Ref Range: 0.0 - 1.2 mg/dL 0.4   eGFR Latest Ref Range: >59 mL/min/1.73 96   Results for MINERVA PIERRE (MRN [id number]) as of 12/29/2021 07:27   Ref. Range 12/18/2021 07:42   Cholesterol " Latest Ref Range: 100 - 199 mg/dL 168   Triglycerides Latest Ref Range: 0 - 149 mg/dL 117   HDL Latest Ref Range: >39 mg/dL 44   LDL Calculated Latest Ref Range: 0 - 99 mg/dL 103 (H)     Stable BP.  Continue to monitor.  Continue weight loss efforts.         Morbid obesity due to excess calories (CMS/HCC)     Weight is up from last time. Using Kayy Ulloa.  Frustrated.  Add Rybelsus daily  Follow up 3 months.         Marginal zone lymphoma (CMS/HCC)     Results for MINERVA PIERRE (MRN [id number]) as of 12/29/2021 07:27   Ref. Range 12/18/2021 07:42   WBC Latest Ref Range: 3.4 - 10.8 x10E3/uL 8.9   RBC Latest Ref Range: 3.77 - 5.28 x10E6/uL 4.55   Hemoglobin Latest Ref Range: 11.1 - 15.9 g/dL 12.7   Hematocrit Latest Ref Range: 34.0 - 46.6 % 37.9   MCV Latest Ref Range: 79 - 97 fL 83   MCH Latest Ref Range: 26.6 - 33.0 pg 27.9   MCHC Latest Ref Range: 31.5 - 35.7 g/dL 33.5   RDW Latest Ref Range: 11.7 - 15.4 % 13.1   Platelets Latest Ref Range: 150 - 450 x10E3/uL 301     Dr Salcedo follows.         Type 2 diabetes mellitus with hyperglycemia, without long-term current use of insulin (CMS/[facility], located within [facility]) - Primary     Results for MINERVA PIERRE (MRN [id number]) as of 12/29/2021 07:27   Ref. Range 12/18/2021 07:42   Hemoglobin A1C Latest Ref Range: 4.8 - 5.6 % 5.9 (H)     Very stable  Adding Rybelsus  For weight loss help.  Follow up 3 mo.  Check A1C 3 months.         Relevant Medications    semaglutide (RYBELSUS) 3 mg tablet    lancets (ACCU-CHEK FASTCLIX LANCET DRUM) [facility]    blood sugar diagnostic (ACCU-CHEK GUIDE TEST STRIPS) strip    Other Relevant Orders    Hemoglobin A1c      Other Visit Diagnoses     Colon cancer screening        Relevant Orders    Ambulatory referral to Gastroenterology        Schedule COVID vaccine.      HAYDER James  12/29/2021

## 2021-12-29 NOTE — ASSESSMENT & PLAN NOTE
Results for MINERVA PIERRE (MRN [id number]) as of 12/29/2021 07:27   Ref. Range 12/18/2021 07:42   Sodium Latest Ref Range: 134 - 144 mmol/L 144   Potassium Latest Ref Range: 3.5 - 5.2 mmol/L 4.8   Chloride Latest Ref Range: 96 - 106 mmol/L 102   CO2 Latest Ref Range: 20 - 29 mmol/L 28   BUN Latest Ref Range: 8 - 27 mg/dL 14   Creatinine Latest Ref Range: 0.57 - 1.00 mg/dL 0.66   Glucose Latest Ref Range: 65 - 99 mg/dL 113 (H)   Calcium Latest Ref Range: 8.7 - 10.3 mg/dL 9.9   AST (SGOT) Latest Ref Range: 0 - 40 IU/L 19   ALT (SGPT) Latest Ref Range: 0 - 32 IU/L 19   Alkaline Phosphatase Latest Ref Range: 44 - 121 IU/L 74   Total Protein Latest Ref Range: 6.0 - 8.5 g/dL 7.2   Albumin Latest Ref Range: 3.8 - 4.9 g/dL 4.6   Bilirubin, Total Latest Ref Range: 0.0 - 1.2 mg/dL 0.4   eGFR Latest Ref Range: >59 mL/min/1.73 96   Results for MINERVA PIERRE (MRN [id number]) as of 12/29/2021 07:27   Ref. Range 12/18/2021 07:42   Cholesterol Latest Ref Range: 100 - 199 mg/dL 168   Triglycerides Latest Ref Range: 0 - 149 mg/dL 117   HDL Latest Ref Range: >39 mg/dL 44   LDL Calculated Latest Ref Range: 0 - 99 mg/dL 103 (H)     Stable BP.  Continue to monitor.  Continue weight loss efforts.

## 2022-01-02 DIAGNOSIS — E11.65 TYPE 2 DIABETES MELLITUS WITH HYPERGLYCEMIA, WITHOUT LONG-TERM CURRENT USE OF INSULIN (CMS/HCC): ICD-10-CM

## 2022-01-03 RX ORDER — METFORMIN HYDROCHLORIDE 500 MG/1
TABLET, EXTENDED RELEASE ORAL
Qty: 360 TABLET | Refills: 0 | Status: SHIPPED | OUTPATIENT
Start: 2022-01-03 | End: 2022-03-30 | Stop reason: SDUPTHER

## 2022-01-03 NOTE — TELEPHONE ENCOUNTER
Medicine Refill Request    Last Office: 12/29/2021   Last Consult Visit: Visit date not found  Last Telemedicine Visit: 9/29/2021 Nicole Gonzalez DO    Next Appointment: 3/30/2022      Current Outpatient Medications:   •  blood sugar diagnostic (ACCU-CHEK GUIDE TEST STRIPS) strip, Test 1-2 times daily, Disp: 100 strip, Rfl: 1  •  blood-glucose meter (ACCU-CHEK GUIDE GLUCOSE METER) misc, Test bid, Disp: 1 each, Rfl: 0  •  lancets (ACCU-CHEK FASTCLIX LANCET DRUM) misc, Test blood sugar one to two times daily, Disp: 100 each, Rfl: 1  •  metFORMIN XR (GLUCOPHAGE-XR) 500 mg 24 hr tablet, TAKE 2 TABLETS BY MOUTH TWICE A DAY, Disp: 360 tablet, Rfl: 1  •  semaglutide (RYBELSUS) 3 mg tablet, Take 1 tablet (3 mg total) by mouth daily., Disp: 30 tablet, Rfl: 0      BP Readings from Last 3 Encounters:   12/29/21 130/82   06/24/21 124/82   03/05/21 140/80       Recent Lab results:  Lab Results   Component Value Date    CHOL 168 12/18/2021   ,   Lab Results   Component Value Date    HDL 44 12/18/2021   ,   Lab Results   Component Value Date    LDLCALC 103 (H) 12/18/2021   ,   Lab Results   Component Value Date    TRIG 117 12/18/2021        Lab Results   Component Value Date    GLUCOSE 113 (H) 12/18/2021   ,   Lab Results   Component Value Date    HGBA1C 5.9 (H) 12/18/2021         Lab Results   Component Value Date    CREATININE 0.66 12/18/2021       Lab Results   Component Value Date    TSH 3.050 12/18/2021

## 2022-01-14 DIAGNOSIS — E11.65 TYPE 2 DIABETES MELLITUS WITH HYPERGLYCEMIA, WITHOUT LONG-TERM CURRENT USE OF INSULIN (CMS/HCC): ICD-10-CM

## 2022-01-14 RX ORDER — BLOOD SUGAR DIAGNOSTIC
STRIP MISCELLANEOUS
Qty: 100 STRIP | Refills: 1 | Status: SHIPPED | OUTPATIENT
Start: 2022-01-14 | End: 2022-01-17 | Stop reason: SDUPTHER

## 2022-01-14 NOTE — TELEPHONE ENCOUNTER
Medicine Refill Request    Last Office: 12/29/2021   Last Consult Visit: Visit date not found  Last Telemedicine Visit: 9/29/2021 Nicole Gonzalez DO    Next Appointment: 3/30/2022      Current Outpatient Medications:   •  metFORMIN  mg 24 hr tablet, TAKE 2 TABLETS TWICE A DAY, Disp: 360 tablet, Rfl: 0  •  blood sugar diagnostic (ACCU-CHEK GUIDE TEST STRIPS) strip, Test 1-2 times daily, Disp: 100 strip, Rfl: 1  •  blood-glucose meter (ACCU-CHEK GUIDE GLUCOSE METER) misc, Test bid, Disp: 1 each, Rfl: 0  •  lancets (ACCU-CHEK FASTCLIX LANCET DRUM) misc, Test blood sugar one to two times daily, Disp: 100 each, Rfl: 1  •  semaglutide (RYBELSUS) 3 mg tablet, Take 1 tablet (3 mg total) by mouth daily., Disp: 30 tablet, Rfl: 0      BP Readings from Last 3 Encounters:   12/29/21 130/82   06/24/21 124/82   03/05/21 140/80       Recent Lab results:  Lab Results   Component Value Date    CHOL 168 12/18/2021   ,   Lab Results   Component Value Date    HDL 44 12/18/2021   ,   Lab Results   Component Value Date    LDLCALC 103 (H) 12/18/2021   ,   Lab Results   Component Value Date    TRIG 117 12/18/2021        Lab Results   Component Value Date    GLUCOSE 113 (H) 12/18/2021   ,   Lab Results   Component Value Date    HGBA1C 5.9 (H) 12/18/2021         Lab Results   Component Value Date    CREATININE 0.66 12/18/2021       Lab Results   Component Value Date    TSH 3.050 12/18/2021

## 2022-01-17 DIAGNOSIS — E11.65 TYPE 2 DIABETES MELLITUS WITH HYPERGLYCEMIA, WITHOUT LONG-TERM CURRENT USE OF INSULIN (CMS/HCC): ICD-10-CM

## 2022-01-17 RX ORDER — BLOOD SUGAR DIAGNOSTIC
STRIP MISCELLANEOUS
Qty: 100 STRIP | Refills: 1 | Status: SHIPPED | OUTPATIENT
Start: 2022-01-17 | End: 2025-03-18 | Stop reason: SDUPTHER

## 2022-02-20 ENCOUNTER — RX ONLY (RX ONLY)
Age: 62
End: 2022-02-20

## 2022-02-20 RX ORDER — BETAMETHASONE DIPROPIONATE 0.5 MG/G
CREAM, AUGMENTED TOPICAL
Qty: 50 | Refills: 5 | Status: ERX | COMMUNITY
Start: 2022-02-20

## 2022-03-16 ENCOUNTER — TELEMEDICINE (OUTPATIENT)
Dept: PRIMARY CARE | Facility: CLINIC | Age: 62
End: 2022-03-16
Payer: COMMERCIAL

## 2022-03-16 DIAGNOSIS — E11.65 TYPE 2 DIABETES MELLITUS WITH HYPERGLYCEMIA, WITHOUT LONG-TERM CURRENT USE OF INSULIN (CMS/HCC): ICD-10-CM

## 2022-03-16 DIAGNOSIS — J01.00 ACUTE NON-RECURRENT MAXILLARY SINUSITIS: Primary | ICD-10-CM

## 2022-03-16 DIAGNOSIS — C85.80 MARGINAL ZONE LYMPHOMA (CMS/HCC): ICD-10-CM

## 2022-03-16 PROCEDURE — 99213 OFFICE O/P EST LOW 20 MIN: CPT | Mod: 95 | Performed by: NURSE PRACTITIONER

## 2022-03-16 RX ORDER — AZELASTINE HYDROCHLORIDE, FLUTICASONE PROPIONATE 137; 50 UG/1; UG/1
1 SPRAY, METERED NASAL 2 TIMES DAILY
Qty: 23 G | Refills: 0 | Status: SHIPPED | OUTPATIENT
Start: 2022-03-16 | End: 2022-08-26

## 2022-03-16 RX ORDER — AMOXICILLIN 875 MG/1
875 TABLET, FILM COATED ORAL 2 TIMES DAILY
Qty: 20 TABLET | Refills: 0 | Status: SHIPPED | OUTPATIENT
Start: 2022-03-16 | End: 2022-03-30 | Stop reason: ALTCHOICE

## 2022-03-16 RX ORDER — PREDNISONE 20 MG/1
TABLET ORAL
COMMUNITY
Start: 2022-03-14 | End: 2022-03-30 | Stop reason: ALTCHOICE

## 2022-03-16 RX ORDER — AZITHROMYCIN 250 MG/1
TABLET, FILM COATED ORAL
COMMUNITY
Start: 2022-03-14 | End: 2022-03-30 | Stop reason: ALTCHOICE

## 2022-03-16 ASSESSMENT — ENCOUNTER SYMPTOMS
SHORTNESS OF BREATH: 0
NECK PAIN: 0
HEADACHES: 1
SWOLLEN GLANDS: 0
SINUS PRESSURE: 1
SORE THROAT: 0
SINUS COMPLAINT: 1
COUGH: 1
CHILLS: 0
HOARSE VOICE: 0

## 2022-03-16 NOTE — PROGRESS NOTES
Verification of Patient Location:  The patient affirms they are currently located in the following state: [place]    Request for Consent:    Audio and Video Encounter   Hello, my name is HAYDER James.  Before we proceed, can you please verify your identification by telling me your full name and date of birth?  Can you tell me who is in the room with you?    You and I are about to have a telemedicine check-in or visit because you have requested it.  This is a live video-conference.  I am a real person, speaking to you in real time.  There is no one else with me on the video-conference.  However, when we use ([employer], [employer], etc) it is important for you to know that the video-conference may not be secure or private.  I am not recording this conversation and I am asking you not to record it.  This telemedicine visit will be billed to your health insurance or you, if you are self-insured.  You understand you will be responsible for any copayments or coinsurances that apply to your telemedicine visit.  Communication platform used for this encounter:  [employer] Video Visit (no Zoom)     Before starting our telemedicine visit, I am required to get your consent for this virtual check-in or visit by telemedicine. Do you consent?      Patient Response to Request for Consent:  Yes    Patient Active Problem List   Diagnosis   • Essential hypertension, benign   • Fatty liver   • Morbid obesity due to excess calories (CMS/HCC)   • Marginal zone lymphoma (CMS/HCC)   • Type 2 diabetes mellitus with hyperglycemia, without long-term current use of insulin (CMS/HCC)   • Acute non-recurrent maxillary sinusitis   • Asymmetrical sensorineural hearing loss     Current Outpatient Medications on File Prior to Visit   Medication Sig Dispense Refill   • metFORMIN  mg 24 hr tablet TAKE 2 TABLETS TWICE A  tablet 0   • azithromycin (ZITHROMAX) 250 mg tablet      • blood sugar diagnostic (ACCU-CHEK GUIDE TEST STRIPS) strip  Test 1-2 times daily 100 strip 1   • blood-glucose meter (ACCU-CHEK GUIDE GLUCOSE METER) misc Test bid 1 each 0   • lancets (ACCU-CHEK FASTCLIX LANCET DRUM) misc Test blood sugar one to two times daily 100 each 1   • predniSONE (DELTASONE) 20 mg tablet      • semaglutide (RYBELSUS) 3 mg tablet Take 1 tablet (3 mg total) by mouth daily. 30 tablet 0     No current facility-administered medications on file prior to visit.     Allergies   Allergen Reactions   • Clarithromycin Rash     Social History     Tobacco Use   • Smoking status: Never Smoker   • Smokeless tobacco: Never Used   Substance Use Topics   • Alcohol use: Not on file   • Drug use: Not on file     Health Maintenance   Topic Date Due   • COVID-19 Vaccine (1) Never done   • Colonoscopy  03/30/2022 (Originally 5/23/2006)   • Zoster Vaccine (1 of 2) 12/29/2022 (Originally 5/23/2011)   • Influenza Vaccine (1) 12/29/2022 (Originally 8/1/2021)   • Annual Dilated Retinal Exam  11/16/2022   • Hemoglobin A1C  12/18/2022   • Urine Protein Screening  12/18/2022   • Diabetic Foot Exam  12/30/2022   • Cervical Cancer Screening  03/07/2023   • Breast Cancer Screening  08/09/2023   • Hepatitis C Screening  Completed   • Meningococcal ACWY  Aged Out   • HIB Vaccines  Aged Out   • IPV Vaccines  Aged Out   • HPV Vaccines  Aged Out   • DTaP, Tdap, and Td Vaccines  Discontinued   • HIV Screening  Discontinued   • Pneumococcal  Discontinued         Visit Documentation:  Subjective     Patient ID: Anh Rowan is a 61 y.o. female.  1960      Sinus issues  Recent UC visit.  Treated with Zpack and steroids and does not feel any better.  COVID tested negative.  Severe congestion  Mucus is thick and yellow/green in color    Sinus Problem  This is a new problem. The current episode started in the past 7 days. Associated symptoms include congestion, coughing, ear pain, headaches and sinus pressure. Pertinent negatives include no chills, hoarse voice, neck pain, shortness of  breath, sneezing, sore throat or swollen glands. Past treatments include antibiotics (steroids). The treatment provided no relief.       The following have been reviewed and updated as appropriate in this visit:   Allergies  Meds  Problems       Review of Systems   Constitutional: Negative for chills.   HENT: Positive for congestion, ear pain and sinus pressure. Negative for hoarse voice, sneezing and sore throat.    Respiratory: Positive for cough. Negative for shortness of breath.    Musculoskeletal: Negative for neck pain.   Neurological: Positive for headaches.     Physical Exam  Constitutional:       General: She is not in acute distress.     Appearance: Normal appearance. She is not ill-appearing, toxic-appearing or diaphoretic.   HENT:      Nose: Congestion present.      Right Sinus: Maxillary sinus tenderness present.      Left Sinus: Maxillary sinus tenderness present.      Comments: Per pt exam  Pulmonary:      Effort: Pulmonary effort is normal. No respiratory distress.      Breath sounds: No stridor. No wheezing.   Neurological:      Mental Status: She is alert and oriented to person, place, and time.         Assessment/Plan   Diagnoses and all orders for this visit:    Acute non-recurrent maxillary sinusitis (Primary)  Assessment & Plan:  Amox BID #20  Dymista NS BID  Push po fluids  Follow up if symptoms worsen/persist.      Orders:  -     amoxicillin (AMOXIL) 875 mg tablet; Take 1 tablet (875 mg total) by mouth 2 (two) times a day for 10 days.  -     azelastine-fluticasone (DYMISTA) 137-50 mcg/spray nasal spray; Administer 1 spray into each nostril 2 (two) times a day.    Marginal zone lymphoma (CMS/HCC)  Assessment & Plan:  Stable.  Dr Salcedo follows.      Type 2 diabetes mellitus with hyperglycemia, without long-term current use of insulin (CMS/HCC)  Assessment & Plan:  Stable on current meds.  Recent A1C 5.9.  Next follow up June 2022.        Time Spent:  I spent 20 minutes on this date of  service performing the following activities: obtaining history, performing examination, entering orders, documenting, preparing for visit, obtaining / reviewing records and providing counseling and education.

## 2022-03-27 LAB — HBA1C MFR BLD: 6.1 % (ref 4.8–5.6)

## 2022-03-30 ENCOUNTER — OFFICE VISIT (OUTPATIENT)
Dept: PRIMARY CARE | Facility: CLINIC | Age: 62
End: 2022-03-30
Payer: COMMERCIAL

## 2022-03-30 VITALS
WEIGHT: 271.8 LBS | RESPIRATION RATE: 16 BRPM | BODY MASS INDEX: 41.19 KG/M2 | HEART RATE: 96 BPM | TEMPERATURE: 98.3 F | OXYGEN SATURATION: 99 % | HEIGHT: 68 IN | SYSTOLIC BLOOD PRESSURE: 136 MMHG | DIASTOLIC BLOOD PRESSURE: 82 MMHG

## 2022-03-30 DIAGNOSIS — I10 ESSENTIAL HYPERTENSION, BENIGN: ICD-10-CM

## 2022-03-30 DIAGNOSIS — E11.9 TYPE 2 DIABETES MELLITUS WITHOUT COMPLICATION, WITHOUT LONG-TERM CURRENT USE OF INSULIN (CMS/HCC): Primary | ICD-10-CM

## 2022-03-30 DIAGNOSIS — E11.65 TYPE 2 DIABETES MELLITUS WITH HYPERGLYCEMIA, WITHOUT LONG-TERM CURRENT USE OF INSULIN (CMS/HCC): ICD-10-CM

## 2022-03-30 DIAGNOSIS — E66.01 MORBID OBESITY DUE TO EXCESS CALORIES (CMS/HCC): ICD-10-CM

## 2022-03-30 PROBLEM — J01.00 ACUTE NON-RECURRENT MAXILLARY SINUSITIS: Status: RESOLVED | Noted: 2020-12-10 | Resolved: 2022-03-30

## 2022-03-30 PROCEDURE — 3079F DIAST BP 80-89 MM HG: CPT | Performed by: NURSE PRACTITIONER

## 2022-03-30 PROCEDURE — 3075F SYST BP GE 130 - 139MM HG: CPT | Performed by: NURSE PRACTITIONER

## 2022-03-30 PROCEDURE — 3008F BODY MASS INDEX DOCD: CPT | Performed by: NURSE PRACTITIONER

## 2022-03-30 PROCEDURE — 99214 OFFICE O/P EST MOD 30 MIN: CPT | Performed by: NURSE PRACTITIONER

## 2022-03-30 RX ORDER — ORAL SEMAGLUTIDE 7 MG/1
7 TABLET ORAL DAILY
Qty: 30 TABLET | Refills: 0 | Status: SHIPPED | OUTPATIENT
Start: 2022-03-30 | End: 2022-04-08 | Stop reason: SDUPTHER

## 2022-03-30 RX ORDER — METFORMIN HYDROCHLORIDE 500 MG/1
TABLET, EXTENDED RELEASE ORAL
Qty: 360 TABLET | Refills: 1 | Status: SHIPPED | OUTPATIENT
Start: 2022-03-30 | End: 2022-04-08 | Stop reason: SDUPTHER

## 2022-03-30 RX ORDER — FLUTICASONE PROPIONATE 50 MCG
SPRAY, SUSPENSION (ML) NASAL
COMMUNITY
Start: 2022-03-14 | End: 2022-08-26

## 2022-03-30 ASSESSMENT — PATIENT HEALTH QUESTIONNAIRE - PHQ9: SUM OF ALL RESPONSES TO PHQ9 QUESTIONS 1 & 2: 0

## 2022-03-30 NOTE — ASSESSMENT & PLAN NOTE
Latest Reference Range & Units 03/26/22 08:09   Hemoglobin A1C 4.8 - 5.6 % 6.1 (H) [1]   (H): Data is abnormally high  [1]          Prediabetes: 5.7 - 6.4            Diabetes: >6.4            Glycemic control for adults with diabetes: <7.0     Increase Rybelsus to 7mg daily  Referred to [facility].  Weight loss encouraged.  Regular exercise encouraged.  Follow up 6 months

## 2022-03-30 NOTE — PROGRESS NOTES
[facility] at [address]  [place] [address]  [phone]  Fax [phone]      Patient ID: Anh Rowan                              : 1960    Visit Date: 3/30/2022    Chief Complaint: Follow-up         Patient ID: Anh Rowan is a 61 y.o. female.    Patient Active Problem List   Diagnosis   • Essential hypertension, benign   • Fatty liver   • Morbid obesity due to excess calories (CMS/HCC)   • Marginal zone lymphoma (CMS/HCC)   • Type 2 diabetes mellitus without complication, without long-term current use of insulin (CMS/HCC)   • Asymmetrical sensorineural hearing loss         Current Outpatient Medications:   •  azelastine-fluticasone (DYMISTA) 137-50 mcg/spray nasal spray, Administer 1 spray into each nostril 2 (two) times a day., Disp: 23 g, Rfl: 0  •  blood sugar diagnostic (ACCU-CHEK GUIDE TEST STRIPS) strip, Test 1-2 times daily, Disp: 100 strip, Rfl: 1  •  blood-glucose meter (ACCU-CHEK GUIDE GLUCOSE METER) misc, Test bid, Disp: 1 each, Rfl: 0  •  fluticasone propionate (FLONASE) 50 mcg/actuation nasal spray, TAKE 1 SPRAY EACH NOSTRIL TWICE DAILY FOR 14 DAYS (AND AS NEEDED FOR NASAL OBSTRUCTION), Disp: , Rfl:   •  lancets (ACCU-CHEK FASTCLIX LANCET DRUM) misc, Test blood sugar one to two times daily, Disp: 100 each, Rfl: 1  •  metFORMIN XR (GLUCOPHAGE-XR) 500 mg 24 hr tablet, TAKE 2 TABLETS TWICE A DAY, Disp: 360 tablet, Rfl: 1  •  semaglutide (RYBELSUS) 7 mg tablet, Take 1 tablet (7 mg total) by mouth daily., Disp: 30 tablet, Rfl: 0    Allergies   Allergen Reactions   • Clarithromycin Rash       Social History     Tobacco Use   • Smoking status: Never Smoker   • Smokeless tobacco: Never Used       Health Maintenance   Topic Date Due   • Colonoscopy  2022 (Originally 2006)   • Zoster Vaccine (1 of 2) 2022 (Originally 2011)   • Influenza Vaccine (1) 2022 (Originally 2021)   • COVID-19 Vaccine  (1) 12/31/2022 (Originally 5/23/1972)   • Annual Dilated Retinal Exam  11/16/2022   • Urine Protein Screening  12/18/2022   • Diabetic Foot Exam  12/30/2022   • Cervical Cancer Screening  03/07/2023   • Hemoglobin A1C  03/26/2023   • Breast Cancer Screening  08/09/2023   • Hepatitis C Screening  Completed   • Meningococcal ACWY  Aged Out   • HIB Vaccines  Aged Out   • IPV Vaccines  Aged Out   • HPV Vaccines  Aged Out   • DTaP, Tdap, and Td Vaccines  Discontinued   • HIV Screening  Discontinued   • Pneumococcal  Discontinued       HPI  Routine follow up  Recent labs    Diabetes  She presents for her follow-up diabetic visit. She has type 2 diabetes mellitus. Her disease course has been stable. Pertinent negatives for diabetes include no chest pain. Weakness: hyperten. There are no diabetic complications. Her weight is decreasing steadily. She is following a generally healthy diet. Meal planning includes avoidance of concentrated sweets (on Kayy Artemio). She participates in exercise intermittently. Her home blood glucose trend is increasing steadily. Her breakfast blood glucose is taken between 7-8 am. Her breakfast blood glucose range is generally 140-180 mg/dl. An ACE inhibitor/angiotensin II receptor blocker is not being taken. She sees a podiatrist.Eye exam is current.   Hypertension  This is a chronic problem. The current episode started more than 1 year ago. The problem has been resolved since onset. The problem is controlled. Pertinent negatives include no chest pain, palpitations, peripheral edema or shortness of breath. There are no associated agents to hypertension. Past treatments include lifestyle changes. The current treatment provides significant improvement. There are no compliance problems.        The following have been reviewed and updated as appropriate in this visit:   Allergies  Meds  Problems           Review of System  Review of Systems   Respiratory: Negative for shortness of breath.   "  Cardiovascular: Negative for chest pain and palpitations.   Neurological: Weakness: hyperten.       Objective     Vitals  Vitals:    03/30/22 1533   BP: 136/82   BP Location: Left upper arm   Patient Position: Sitting   Pulse: 96   Resp: 16   Temp: 36.8 °C (98.3 °F)   TempSrc: Oral   SpO2: 99%   Weight: 123 kg (271 lb 12.8 oz)   Height: 1.715 m (5' 7.52\")     Body mass index is 41.92 kg/m².    Physical Exam  Physical Exam  Vitals reviewed.   Constitutional:       General: She is not in acute distress.     Appearance: Normal appearance. She is not diaphoretic.   Cardiovascular:      Rate and Rhythm: Normal rate and regular rhythm.      Heart sounds: No murmur heard.    No friction rub. No gallop.   Pulmonary:      Effort: Pulmonary effort is normal.      Breath sounds: Normal breath sounds. No wheezing, rhonchi or rales.   Neurological:      Mental Status: She is alert and oriented to person, place, and time.         Assessment/Plan     Problem List Items Addressed This Visit     Essential hypertension, benign     BP borderline today.  To consider low dose ACE/ARB.  Weight loss emphasized.  Follow up 6 mo.           Morbid obesity due to excess calories (CMS/HCC)     Weight loss encouraged.  On Kayy Ulloa now and feels her blood sugars are going up on the new plan.  Referred to [facility].           Type 2 diabetes mellitus without complication, without long-term current use of insulin (CMS/HCC) - Primary      Latest Reference Range & Units 03/26/22 08:09   Hemoglobin A1C 4.8 - 5.6 % 6.1 (H) [1]   (H): Data is abnormally high  [1]          Prediabetes: 5.7 - 6.4            Diabetes: >6.4            Glycemic control for adults with diabetes: <7.0     Increase Rybelsus to 7mg daily  Referred to [facility].  Weight loss encouraged.  Regular exercise encouraged.  Follow up 6 months           Relevant Medications    semaglutide (RYBELSUS) 7 mg tablet    metFORMIN XR (GLUCOPHAGE-XR) 500 mg 24 hr tablet    "   Other Visit Diagnoses     Type 2 diabetes mellitus with hyperglycemia, without long-term current use of insulin (CMS/[facility])        Relevant Medications    semaglutide (RYBELSUS) 7 mg tablet    metFORMIN XR (GLUCOPHAGE-XR) 500 mg 24 hr tablet    Other Relevant Orders    Ambulatory referral to HAYDER Delgado  3/31/2022

## 2022-03-31 ASSESSMENT — ENCOUNTER SYMPTOMS
PALPITATIONS: 0
HYPERTENSION: 1
SHORTNESS OF BREATH: 0

## 2022-03-31 NOTE — ASSESSMENT & PLAN NOTE
Weight loss encouraged.  On Kayydimple Ulloa now and feels her blood sugars are going up on the new plan.  Referred to [facility].

## 2022-05-24 NOTE — ASSESSMENT & PLAN NOTE
Recent dx--skin lesion on her scalp.  Seeing Dr Salcedo.  PET scan scheduled for 4/6/2019.  Will need wide excision of area-- schedule with DERM for early May.   No

## 2022-07-08 ENCOUNTER — TELEPHONE (OUTPATIENT)
Dept: PRIMARY CARE | Facility: CLINIC | Age: 62
End: 2022-07-08
Payer: COMMERCIAL

## 2022-07-08 DIAGNOSIS — E11.65 TYPE 2 DIABETES MELLITUS WITH HYPERGLYCEMIA, WITHOUT LONG-TERM CURRENT USE OF INSULIN (CMS/HCC): ICD-10-CM

## 2022-07-08 RX ORDER — METFORMIN HYDROCHLORIDE 500 MG/1
TABLET, EXTENDED RELEASE ORAL
Qty: 14 TABLET | Refills: 0 | Status: SHIPPED | OUTPATIENT
Start: 2022-07-08 | End: 2022-10-10

## 2022-07-08 NOTE — TELEPHONE ENCOUNTER
Patient had a family emergency down in [place].  Airport security confiscated her metformin 500mg.  Patient is asking if Meghan would send a weeks worth to the [facility] in [facility].    Patient would like to be notified when sent.

## 2022-07-08 NOTE — TELEPHONE ENCOUNTER
Martha @ pharmacy in [place] left message checking on qty of meds. Patient thought there should have been more. Confirmed with [facility]

## 2022-07-13 ENCOUNTER — TELEPHONE (OUTPATIENT)
Dept: PRIMARY CARE | Facility: CLINIC | Age: 62
End: 2022-07-13
Payer: COMMERCIAL

## 2022-07-13 DIAGNOSIS — M54.30 SCIATICA, UNSPECIFIED LATERALITY: Primary | ICD-10-CM

## 2022-07-13 RX ORDER — METHYLPREDNISOLONE 4 MG/1
TABLET ORAL
Qty: 21 TABLET | Refills: 0 | Status: SHIPPED
Start: 2022-07-13 | End: 2022-07-15 | Stop reason: ALTCHOICE

## 2022-07-13 NOTE — TELEPHONE ENCOUNTER
Patient is still in [place] (family emergency) and she leaves tomorrow morning.    Patient said her sciatica is acting up and she is asking if Meghan could send something to the pharmacy for her to get through the flight home.    Prescription to go to [facility] in [place].    Patient said she will make an appt with Meghan when she gets home

## 2022-07-19 ENCOUNTER — OFFICE VISIT (OUTPATIENT)
Dept: PRIMARY CARE | Facility: CLINIC | Age: 62
End: 2022-07-19
Payer: COMMERCIAL

## 2022-07-19 VITALS
DIASTOLIC BLOOD PRESSURE: 80 MMHG | RESPIRATION RATE: 19 BRPM | BODY MASS INDEX: 42.57 KG/M2 | HEART RATE: 108 BPM | HEIGHT: 67 IN | OXYGEN SATURATION: 99 % | SYSTOLIC BLOOD PRESSURE: 130 MMHG | TEMPERATURE: 97.8 F

## 2022-07-19 DIAGNOSIS — R00.0 TACHYCARDIA: ICD-10-CM

## 2022-07-19 DIAGNOSIS — M54.31 RIGHT SCIATIC NOTCH PAIN: Primary | ICD-10-CM

## 2022-07-19 PROCEDURE — 3008F BODY MASS INDEX DOCD: CPT | Performed by: STUDENT IN AN ORGANIZED HEALTH CARE EDUCATION/TRAINING PROGRAM

## 2022-07-19 PROCEDURE — 99213 OFFICE O/P EST LOW 20 MIN: CPT | Performed by: STUDENT IN AN ORGANIZED HEALTH CARE EDUCATION/TRAINING PROGRAM

## 2022-07-19 PROCEDURE — 3079F DIAST BP 80-89 MM HG: CPT | Performed by: STUDENT IN AN ORGANIZED HEALTH CARE EDUCATION/TRAINING PROGRAM

## 2022-07-19 PROCEDURE — 3075F SYST BP GE 130 - 139MM HG: CPT | Performed by: STUDENT IN AN ORGANIZED HEALTH CARE EDUCATION/TRAINING PROGRAM

## 2022-07-19 RX ORDER — NAPROXEN 500 MG/1
500 TABLET ORAL 2 TIMES DAILY WITH MEALS
Qty: 20 TABLET | Refills: 0 | Status: SHIPPED | OUTPATIENT
Start: 2022-07-19 | End: 2022-08-03 | Stop reason: ALTCHOICE

## 2022-07-19 RX ORDER — LIDOCAINE 50 MG/G
1 PATCH TOPICAL DAILY
Qty: 30 PATCH | Refills: 1 | Status: SHIPPED | OUTPATIENT
Start: 2022-07-19 | End: 2022-08-26 | Stop reason: ALTCHOICE

## 2022-07-19 RX ORDER — CYCLOBENZAPRINE HCL 10 MG
10 TABLET ORAL 3 TIMES DAILY PRN
Qty: 30 TABLET | Refills: 0 | Status: SHIPPED | OUTPATIENT
Start: 2022-07-19 | End: 2022-08-03 | Stop reason: ALTCHOICE

## 2022-07-19 ASSESSMENT — PATIENT HEALTH QUESTIONNAIRE - PHQ9: SUM OF ALL RESPONSES TO PHQ9 QUESTIONS 1 & 2: 0

## 2022-07-19 NOTE — ASSESSMENT & PLAN NOTE
No significant radiculopathic symptoms, no red flag symptoms. Will start with supportive care.   -Naproxen 500mg, 1 tablet, twice daily, for 5 days - sent to pharmacy  -Flexiril, half to 1 tablet, as needed - sent to pharmacy  -Lidocaine patch, keep on for 12 hours - sent to pharmacy  -Try Voltaren gel - over the counter  -Look up [facility] - sciatica pain/lumbar strain. Do once daily

## 2022-07-19 NOTE — PATIENT INSTRUCTIONS
-Naproxen 500mg, 1 tablet, twice daily, for 5 days - sent to pharmacy  -Flexiril, half to 1 tablet, as needed - sent to pharmacy  -Lidocaine patch, keep on for 12 hours - sent to pharmacy  -Try Voltaren gel - over the counter  -Look up [facility] - sciatica pain/lumbar strain. Do once daily

## 2022-07-19 NOTE — PROGRESS NOTES
[facility] in [place]  Dr. Darell Barriga  [address], [address]  [place] [address]  Phone: [phone]  Fax: [phone]        Patient ID: Anh Rowan                              : 1960    Visit Date: 2022    Chief Complaint: Hip Pain (R sided lower back pain radiating down to the hip. Hurts to move. )      Patient ID: Anh Rowan is a 62 y.o. female.    HPI  HPI  CC: Sciatica  -Started  - occurred during flight  -Right sided pain - pain goes into right buttock. Occasional numbness in posterior thigh (though is  -Pain is better in mornings Towards after lunch, acts up. At night, throbs  -No weakness, no bowel/bladder incontinecne, no saddle anesthesias  -Called office, prescribed Prednisone 40mg, 5 day course, on Thursday  -Not taking anything else for pain  -No history of back pain, no other trauma        Patient Active Problem List   Diagnosis   • Essential hypertension, benign   • Fatty liver   • Morbid obesity due to excess calories (CMS/HCC)   • Marginal zone lymphoma (CMS/HCC)   • Type 2 diabetes mellitus without complication, without long-term current use of insulin (CMS/HCC)   • Asymmetrical sensorineural hearing loss   • Right sciatic notch pain       Past Medical History:   Diagnosis Date   • Abnormal LFTs 2020       No past surgical history on file.      Current Outpatient Medications:   •  blood sugar diagnostic (ACCU-CHEK GUIDE TEST STRIPS) strip, Test 1-2 times daily, Disp: 100 strip, Rfl: 1  •  blood-glucose meter (ACCU-CHEK GUIDE GLUCOSE METER) misc, Test bid, Disp: 1 each, Rfl: 0  •  cyclobenzaprine (FLEXERIL) 10 mg tablet, Take 1 tablet (10 mg total) by mouth 3 (three) times a day as needed for muscle spasms for up to 14 days., Disp: 30 tablet, Rfl: 0  •  fluticasone propionate (FLONASE) 50 mcg/actuation nasal spray, TAKE 1 SPRAY EACH NOSTRIL TWICE DAILY FOR 14 DAYS (AND AS NEEDED FOR NASAL OBSTRUCTION), Disp: , Rfl:   •  lancets  (ACCU-CHEK FASTCLIX LANCET DRUM) [facility], Test blood sugar one to two times daily, Disp: 100 each, Rfl: 1  •  lidocaine (LIDODERM) 5 % patch, Place 1 patch on the skin daily. Remove & discard patch within 12 hours or as directed by prescriber., Disp: 30 patch, Rfl: 1  •  metFORMIN XR (GLUCOPHAGE-XR) 500 mg 24 hr tablet, TAKE 2 TABLETS TWICE A DAY, Disp: 14 tablet, Rfl: 0  •  naproxen (NAPROSYN) 500 mg tablet, Take 1 tablet (500 mg total) by mouth 2 (two) times a day with meals for 5 days., Disp: 20 tablet, Rfl: 0  •  predniSONE (DELTASONE) 10 mg tablet, 2 po BID x 5 days with food, Disp: 20 tablet, Rfl: 0  •  RYBELSUS 7 mg tablet, TAKE 1 TABLET BY MOUTH EVERY DAY, Disp: 30 tablet, Rfl: 4  •  azelastine-fluticasone (DYMISTA) 137-50 mcg/spray nasal spray, Administer 1 spray into each nostril 2 (two) times a day., Disp: 23 g, Rfl: 0    Allergies   Allergen Reactions   • Clarithromycin Rash       No family history on file.    Social History     Tobacco Use   • Smoking status: Never Smoker   • Smokeless tobacco: Never Used       Health Maintenance   Topic Date Due   • Colonoscopy  08/31/2022 (Originally 5/23/2006)   • Zoster Vaccine (1 of 2) 12/29/2022 (Originally 5/23/1980)   • COVID-19 Vaccine (1) 12/31/2022 (Originally [birth year]/1966)   • Influenza Vaccine (1) 08/01/2022   • Annual Dilated Retinal Exam  11/16/2022   • Diabetes Kidney Health Evaluation: eGFR  12/18/2022   • Diabetes Kidney Health Evaluation:  uACR  12/18/2022   • Urine Protein Screening  12/18/2022   • Diabetic Foot Exam  12/30/2022   • Cervical Cancer Screening  03/07/2023   • Hemoglobin A1C  03/26/2023   • Breast Cancer Screening  08/09/2023   • Hepatitis C Screening  Completed   • Meningococcal ACWY  Aged Out   • HIB Vaccines  Aged Out   • IPV Vaccines  Aged Out   • HPV Vaccines  Aged Out   • DTaP, Tdap, and Td Vaccines  Discontinued   • HIV Screening  Discontinued   • Pneumococcal  Discontinued         The following have been reviewed and updated as  "appropriate in this visit:            Review of System  All others negative    Objective     Vitals  Vitals:    07/19/22 1424 07/19/22 1712   BP: 130/80    BP Location: Left upper arm    Patient Position: Sitting    Pulse: (!) 111 (!) 108   Resp: 19    Temp: 36.6 °C (97.8 °F)    TempSrc: Temporal    SpO2: 99%    Height: 1.702 m (5' 7\")        Body mass index is 42.57 kg/m².    Physical Exam  Physical Exam  Vitals reviewed.   Constitutional:       Appearance: Normal appearance.   Eyes:      Extraocular Movements: Extraocular movements intact.   Cardiovascular:      Rate and Rhythm: Regular rhythm. Tachycardia present.      Pulses: Normal pulses.      Heart sounds: Normal heart sounds.   Pulmonary:      Effort: Pulmonary effort is normal.      Breath sounds: Normal breath sounds.   Abdominal:      General: Bowel sounds are normal.      Palpations: Abdomen is soft.      Tenderness: There is no abdominal tenderness.   Musculoskeletal:      Right lower leg: No edema.      Left lower leg: No edema.      Comments: TTP at right lumbar region near SI joint, no skin changes  5/5 strength to seated hip flexion, knee extension, knee flexion, dorsiflexion/plantar flexion, B/L. Full sensation intact. Seated SLR negative   Skin:     General: Skin is warm and dry.   Neurological:      General: No focal deficit present.      Mental Status: She is alert and oriented to person, place, and time.   Psychiatric:         Mood and Affect: Mood normal.         Behavior: Behavior normal.         Assessment/Plan     Problem List Items Addressed This Visit        Nervous    Right sciatic notch pain - Primary     No significant radiculopathic symptoms, no red flag symptoms. Will start with supportive care.   -Naproxen 500mg, 1 tablet, twice daily, for 5 days - sent to pharmacy  -Flexiril, half to 1 tablet, as needed - sent to pharmacy  -Lidocaine patch, keep on for 12 hours - sent to pharmacy  -Try Voltaren gel - over the counter  -Look up " [facility] - sciatica pain/lumbar strain. Do once daily             Other Visit Diagnoses     Tachycardia        Likely pain response. Heart sounds normal, asymptomatic. F/u in 2 weeks to reassess.          Return in about 2 weeks (around 8/2/2022).    Darell Barriga, DO  Darell Barriga, DO  7/19/2022

## 2022-07-20 ENCOUNTER — TELEPHONE (OUTPATIENT)
Dept: PRIMARY CARE | Facility: CLINIC | Age: 62
End: 2022-07-20
Payer: COMMERCIAL

## 2022-07-20 DIAGNOSIS — M54.31 SCIATIC PAIN, RIGHT: Primary | ICD-10-CM

## 2022-07-20 RX ORDER — LIDOCAINE 50 MG/G
1 PATCH TOPICAL DAILY
Qty: 30 PATCH | Refills: 1 | Status: CANCELLED | OUTPATIENT
Start: 2022-07-20 | End: 2022-08-19

## 2022-07-20 NOTE — TELEPHONE ENCOUNTER
Medicine Refill Request    Last Office: 7/19/2022   Last Consult Visit: Visit date not found  Last Telemedicine Visit: 3/16/2022 Meghan Cordova CRNP    Next Appointment: 8/3/2022      Current Outpatient Medications:   •  azelastine-fluticasone (DYMISTA) 137-50 mcg/spray nasal spray, Administer 1 spray into each nostril 2 (two) times a day., Disp: 23 g, Rfl: 0  •  blood sugar diagnostic (ACCU-CHEK GUIDE TEST STRIPS) strip, Test 1-2 times daily, Disp: 100 strip, Rfl: 1  •  blood-glucose meter (ACCU-CHEK GUIDE GLUCOSE METER) misc, Test bid, Disp: 1 each, Rfl: 0  •  cyclobenzaprine (FLEXERIL) 10 mg tablet, Take 1 tablet (10 mg total) by mouth 3 (three) times a day as needed for muscle spasms for up to 14 days., Disp: 30 tablet, Rfl: 0  •  fluticasone propionate (FLONASE) 50 mcg/actuation nasal spray, TAKE 1 SPRAY EACH NOSTRIL TWICE DAILY FOR 14 DAYS (AND AS NEEDED FOR NASAL OBSTRUCTION), Disp: , Rfl:   •  lancets (ACCU-CHEK FASTCLIX LANCET DRUM) misc, Test blood sugar one to two times daily, Disp: 100 each, Rfl: 1  •  lidocaine (LIDODERM) 5 % patch, Place 1 patch on the skin daily. Remove & discard patch within 12 hours or as directed by prescriber., Disp: 30 patch, Rfl: 1  •  metFORMIN XR (GLUCOPHAGE-XR) 500 mg 24 hr tablet, TAKE 2 TABLETS TWICE A DAY, Disp: 14 tablet, Rfl: 0  •  naproxen (NAPROSYN) 500 mg tablet, Take 1 tablet (500 mg total) by mouth 2 (two) times a day with meals for 5 days., Disp: 20 tablet, Rfl: 0  •  predniSONE (DELTASONE) 10 mg tablet, 2 po BID x 5 days with food, Disp: 20 tablet, Rfl: 0  •  RYBELSUS 7 mg tablet, TAKE 1 TABLET BY MOUTH EVERY DAY, Disp: 30 tablet, Rfl: 4      BP Readings from Last 3 Encounters:   07/19/22 130/80   03/30/22 136/82   12/29/21 130/82       Recent Lab results:  Lab Results   Component Value Date    CHOL 168 12/18/2021   ,   Lab Results   Component Value Date    HDL 44 12/18/2021   ,   Lab Results   Component Value Date    LDLCALC 103 (H) 12/18/2021   ,   Lab Results    Component Value Date    TRIG 117 12/18/2021        Lab Results   Component Value Date    GLUCOSE 113 (H) 12/18/2021   ,   Lab Results   Component Value Date    HGBA1C 6.1 (H) 03/26/2022         Lab Results   Component Value Date    CREATININE 0.66 12/18/2021       Lab Results   Component Value Date    TSH 3.050 12/18/2021

## 2022-07-20 NOTE — TELEPHONE ENCOUNTER
Rx for Lidocaine requires a prior auth since it is available over the counter.     Bin [id number]  Pcn ADV

## 2022-08-03 ENCOUNTER — OFFICE VISIT (OUTPATIENT)
Dept: PRIMARY CARE | Facility: CLINIC | Age: 62
End: 2022-08-03
Payer: COMMERCIAL

## 2022-08-03 VITALS
WEIGHT: 293 LBS | OXYGEN SATURATION: 98 % | HEART RATE: 100 BPM | SYSTOLIC BLOOD PRESSURE: 126 MMHG | RESPIRATION RATE: 18 BRPM | HEIGHT: 67 IN | DIASTOLIC BLOOD PRESSURE: 76 MMHG | BODY MASS INDEX: 45.99 KG/M2

## 2022-08-03 DIAGNOSIS — M54.31 RIGHT SCIATIC NOTCH PAIN: Primary | ICD-10-CM

## 2022-08-03 DIAGNOSIS — E11.9 TYPE 2 DIABETES MELLITUS WITHOUT COMPLICATION, WITHOUT LONG-TERM CURRENT USE OF INSULIN (CMS/HCC): ICD-10-CM

## 2022-08-03 DIAGNOSIS — E66.01 MORBID OBESITY WITH BODY MASS INDEX (BMI) OF 45.0 TO 49.9 IN ADULT (CMS/HCC): ICD-10-CM

## 2022-08-03 PROBLEM — H90.3 ASYMMETRICAL SENSORINEURAL HEARING LOSS: Status: RESOLVED | Noted: 2021-12-29 | Resolved: 2022-08-03

## 2022-08-03 PROCEDURE — 3078F DIAST BP <80 MM HG: CPT | Performed by: NURSE PRACTITIONER

## 2022-08-03 PROCEDURE — 3074F SYST BP LT 130 MM HG: CPT | Performed by: NURSE PRACTITIONER

## 2022-08-03 PROCEDURE — 3008F BODY MASS INDEX DOCD: CPT | Performed by: NURSE PRACTITIONER

## 2022-08-03 PROCEDURE — 99213 OFFICE O/P EST LOW 20 MIN: CPT | Performed by: NURSE PRACTITIONER

## 2022-08-03 ASSESSMENT — ENCOUNTER SYMPTOMS
CONSTITUTIONAL NEGATIVE: 1
CARDIOVASCULAR NEGATIVE: 1
RESPIRATORY NEGATIVE: 1
DIABETIC ASSOCIATED SYMPTOMS: 0

## 2022-08-03 NOTE — ASSESSMENT & PLAN NOTE
Resolved.  Suggested weight loss  Avoid prolonged sitting  Regular exercise and stretches.  Follow up as needed.

## 2022-08-26 ENCOUNTER — HOSPITAL ENCOUNTER (OUTPATIENT)
Dept: RADIOLOGY | Age: 62
Discharge: HOME | End: 2022-08-26
Attending: NURSE PRACTITIONER
Payer: COMMERCIAL

## 2022-08-26 ENCOUNTER — OFFICE VISIT (OUTPATIENT)
Dept: PRIMARY CARE | Facility: CLINIC | Age: 62
End: 2022-08-26
Payer: COMMERCIAL

## 2022-08-26 VITALS
TEMPERATURE: 97.7 F | HEIGHT: 67 IN | HEART RATE: 88 BPM | RESPIRATION RATE: 19 BRPM | SYSTOLIC BLOOD PRESSURE: 130 MMHG | OXYGEN SATURATION: 96 % | DIASTOLIC BLOOD PRESSURE: 80 MMHG | BODY MASS INDEX: 45.89 KG/M2

## 2022-08-26 DIAGNOSIS — M54.31 RIGHT SCIATIC NERVE PAIN: ICD-10-CM

## 2022-08-26 DIAGNOSIS — M54.31 RIGHT SCIATIC NERVE PAIN: Primary | ICD-10-CM

## 2022-08-26 PROCEDURE — 72110 X-RAY EXAM L-2 SPINE 4/>VWS: CPT

## 2022-08-26 PROCEDURE — 3008F BODY MASS INDEX DOCD: CPT | Performed by: NURSE PRACTITIONER

## 2022-08-26 PROCEDURE — 3079F DIAST BP 80-89 MM HG: CPT | Performed by: NURSE PRACTITIONER

## 2022-08-26 PROCEDURE — 3075F SYST BP GE 130 - 139MM HG: CPT | Performed by: NURSE PRACTITIONER

## 2022-08-26 PROCEDURE — 99213 OFFICE O/P EST LOW 20 MIN: CPT | Performed by: NURSE PRACTITIONER

## 2022-08-26 RX ORDER — METHYLPREDNISOLONE 4 MG/1
TABLET ORAL
Qty: 21 TABLET | Refills: 0 | Status: SHIPPED | OUTPATIENT
Start: 2022-08-26 | End: 2022-09-02

## 2022-08-26 ASSESSMENT — ENCOUNTER SYMPTOMS
INABILITY TO BEAR WEIGHT: 0
MUSCLE WEAKNESS: 0
LEG PAIN: 1
LOSS OF SENSATION: 0
TINGLING: 0
LOSS OF MOTION: 0
CHILLS: 0
DIAPHORESIS: 0
FEVER: 0
BACK PAIN: 1
NUMBNESS: 0

## 2022-08-26 ASSESSMENT — PATIENT HEALTH QUESTIONNAIRE - PHQ9: SUM OF ALL RESPONSES TO PHQ9 QUESTIONS 1 & 2: 0

## 2022-08-26 NOTE — PROGRESS NOTES
[facility] at [address]  [facility] [address]  [phone]  Fax [phone]      Patient ID: Anh Rowan                              : 1960    Visit Date: 2022    Chief Complaint: Leg Pain (Shooting pain, on and off.)         Patient ID: Anh Rowan is a 62 y.o. female.    Patient Active Problem List   Diagnosis   • Essential hypertension, benign   • Fatty liver   • Morbid obesity with body mass index (BMI) of 45.0 to 49.9 in adult (CMS/HCC)   • Marginal zone lymphoma (CMS/HCC)   • Type 2 diabetes mellitus without complication, without long-term current use of insulin (CMS/HCC)   • Right sciatic nerve pain         Current Outpatient Medications:   •  blood sugar diagnostic (ACCU-CHEK GUIDE TEST STRIPS) strip, Test 1-2 times daily, Disp: 100 strip, Rfl: 1  •  blood-glucose meter (ACCU-CHEK GUIDE GLUCOSE METER) misc, Test bid, Disp: 1 each, Rfl: 0  •  lancets (ACCU-CHEK FASTCLIX LANCET DRUM) misc, Test blood sugar one to two times daily, Disp: 100 each, Rfl: 1  •  metFORMIN XR (GLUCOPHAGE-XR) 500 mg 24 hr tablet, TAKE 2 TABLETS TWICE A DAY, Disp: 14 tablet, Rfl: 0  •  RYBELSUS 7 mg tablet, TAKE 1 TABLET BY MOUTH EVERY DAY, Disp: 30 tablet, Rfl: 4    Allergies   Allergen Reactions   • Clarithromycin Rash       Social History     Tobacco Use   • Smoking status: Never Smoker   • Smokeless tobacco: Never Used       Health Maintenance   Topic Date Due   • Colorectal Cancer Screening  2022 (Originally 1960)   • Influenza Vaccine (1) 2022 (Originally 2022)   • Zoster Vaccine (1 of 2) 2022 (Originally 1980)   • COVID-19 Vaccine (1) 2022 (Originally 1966)   • Annual Dilated Retinal Exam  2022   • Diabetes Kidney Health Evaluation: eGFR  2022   • Diabetes Kidney Health Evaluation:  uACR  2022   • Urine Protein Screening  2022   • Diabetic Foot Exam  2022   •  "Cervical Cancer Screening  03/07/2023   • Hemoglobin A1C  03/26/2023   • Breast Cancer Screening  08/09/2023   • Hepatitis C Screening  Completed   • Meningococcal ACWY  Aged Out   • HIB Vaccines  Aged Out   • IPV Vaccines  Aged Out   • HPV Vaccines  Aged Out   • DTaP, Tdap, and Td Vaccines  Discontinued   • HIV Screening  Discontinued   • Pneumococcal  Discontinued       HPI  LBP that radiates into R thigh  Legs are swelling by late afternoon--not sure if related  Did get better with meds--but symptoms returned as soon as she stopped the meds.    Leg Pain   The injury mechanism is unknown. The pain is present in the right leg. The quality of the pain is described as shooting and aching. The pain is moderate. The pain has been constant since onset. Pertinent negatives include no inability to bear weight, loss of motion, loss of sensation, muscle weakness, numbness or tingling. The symptoms are aggravated by weight bearing. She has tried NSAIDs (steroids, muscle relaxants) for the symptoms. The treatment provided significant relief.       The following have been reviewed and updated as appropriate in this visit:   Allergies  Meds  Problems           Review of System  Review of Systems   Constitutional: Negative for chills, diaphoresis and fever.   Musculoskeletal: Positive for back pain.   Neurological: Negative for tingling and numbness.       Objective     Vitals  Vitals:    08/26/22 1051   BP: 130/80   BP Location: Left upper arm   Patient Position: Sitting   Pulse: 88   Resp: 19   Temp: 36.5 °C (97.7 °F)   TempSrc: Temporal   SpO2: 96%   Height: 1.702 m (5' 7\")     Body mass index is 45.89 kg/m².    Physical Exam  Physical Exam  Vitals reviewed.   Constitutional:       General: She is not in acute distress.     Appearance: Normal appearance. She is not diaphoretic.   Cardiovascular:      Rate and Rhythm: Normal rate and regular rhythm.   Pulmonary:      Effort: Pulmonary effort is normal. No respiratory " distress.   Musculoskeletal:      Lumbar back: No swelling, edema, deformity, spasms or tenderness. Normal range of motion.   Neurological:      Mental Status: She is alert and oriented to person, place, and time.         Assessment/Plan     Problem List Items Addressed This Visit     Right sciatic nerve pain - Primary     X-ray L spine  To consider PT consult or spine specialist           Relevant Orders    X-RAY LUMBAR SPINE COMPLETE 4+ VIEWS              HAYDER James  8/26/2022

## 2022-09-16 ENCOUNTER — OFFICE VISIT (OUTPATIENT)
Dept: PRIMARY CARE | Facility: CLINIC | Age: 62
End: 2022-09-16
Payer: COMMERCIAL

## 2022-09-16 VITALS
SYSTOLIC BLOOD PRESSURE: 150 MMHG | BODY MASS INDEX: 45.99 KG/M2 | HEIGHT: 67 IN | RESPIRATION RATE: 18 BRPM | OXYGEN SATURATION: 97 % | HEART RATE: 98 BPM | WEIGHT: 293 LBS | DIASTOLIC BLOOD PRESSURE: 80 MMHG | TEMPERATURE: 98.4 F

## 2022-09-16 DIAGNOSIS — I87.2 VENOUS INSUFFICIENCY OF BOTH LOWER EXTREMITIES: Primary | ICD-10-CM

## 2022-09-16 DIAGNOSIS — R60.0 EDEMA OF BOTH LOWER LEGS: ICD-10-CM

## 2022-09-16 DIAGNOSIS — I10 ESSENTIAL HYPERTENSION, BENIGN: ICD-10-CM

## 2022-09-16 PROCEDURE — 3077F SYST BP >= 140 MM HG: CPT | Performed by: NURSE PRACTITIONER

## 2022-09-16 PROCEDURE — 3008F BODY MASS INDEX DOCD: CPT | Performed by: NURSE PRACTITIONER

## 2022-09-16 PROCEDURE — 3079F DIAST BP 80-89 MM HG: CPT | Performed by: NURSE PRACTITIONER

## 2022-09-16 PROCEDURE — 99213 OFFICE O/P EST LOW 20 MIN: CPT | Performed by: NURSE PRACTITIONER

## 2022-09-16 RX ORDER — HYDROCHLOROTHIAZIDE 25 MG/1
25 TABLET ORAL DAILY
Qty: 90 TABLET | Refills: 0 | Status: SHIPPED | OUTPATIENT
Start: 2022-09-16 | End: 2022-12-12

## 2022-09-16 ASSESSMENT — PATIENT HEALTH QUESTIONNAIRE - PHQ9: SUM OF ALL RESPONSES TO PHQ9 QUESTIONS 1 & 2: 0

## 2022-09-16 ASSESSMENT — ENCOUNTER SYMPTOMS
HEADACHES: 0
RESPIRATORY NEGATIVE: 1
CONSTITUTIONAL NEGATIVE: 1
SHORTNESS OF BREATH: 0
HYPERTENSION: 1
BLURRED VISION: 0
PALPITATIONS: 0

## 2022-09-16 NOTE — PROGRESS NOTES
[facility] at [address]  [place] [address]  [phone]  Fax [phone]      Patient ID: Anh Rowan                              : 1960    Visit Date: 2022    Chief Complaint: Joint Swelling (Ankles )         Patient ID: Anh Rowan is a 62 y.o. female.    Patient Active Problem List   Diagnosis    Essential hypertension, benign    Fatty liver    Morbid obesity with body mass index (BMI) of 45.0 to 49.9 in adult (CMS/HCC)    Marginal zone lymphoma (CMS/HCC)    Type 2 diabetes mellitus without complication, without long-term current use of insulin (CMS/HCC)    Right sciatic nerve pain    Venous insufficiency of both lower extremities    Edema of both lower legs         Current Outpatient Medications:     blood sugar diagnostic (ACCU-CHEK GUIDE TEST STRIPS) strip, Test 1-2 times daily, Disp: 100 strip, Rfl: 1    blood-glucose meter (ACCU-CHEK GUIDE GLUCOSE METER) misc, Test bid, Disp: 1 each, Rfl: 0    hydrochlorothiazide (HYDRODIURIL) 25 mg tablet, Take 1 tablet (25 mg total) by mouth daily., Disp: 90 tablet, Rfl: 0    lancets (ACCU-CHEK FASTCLIX LANCET DRUM) misc, Test blood sugar one to two times daily, Disp: 100 each, Rfl: 1    metFORMIN XR (GLUCOPHAGE-XR) 500 mg 24 hr tablet, TAKE 2 TABLETS TWICE A DAY, Disp: 14 tablet, Rfl: 0    RYBELSUS 7 mg tablet, TAKE 1 TABLET BY MOUTH EVERY DAY, Disp: 30 tablet, Rfl: 4    Allergies   Allergen Reactions    Clarithromycin Rash       Social History     Tobacco Use    Smoking status: Never Smoker    Smokeless tobacco: Never Used       Health Maintenance   Topic Date Due    Colorectal Cancer Screening  Never done    Influenza Vaccine (1) 2022 (Originally 2022)    Zoster Vaccine (1 of 2) 2022 (Originally 1980)    COVID-19 Vaccine (1) 2022 (Originally 1966)    Annual Dilated Retinal Exam  2022    Diabetes Kidney Health  "Evaluation: eGFR  12/18/2022    Diabetes Kidney Health Evaluation:  uACR  12/18/2022    Urine Protein Screening  12/18/2022    Diabetic Foot Exam  12/30/2022    Cervical Cancer Screening  03/07/2023    Hemoglobin A1C  03/26/2023    Breast Cancer Screening  08/09/2023    Hepatitis C Screening  Completed    Meningococcal ACWY  Aged Out    HIB Vaccines  Aged Out    IPV Vaccines  Aged Out    HPV Vaccines  Aged Out    DTaP, Tdap, and Td Vaccines  Discontinued    HIV Screening  Discontinued    Pneumococcal  Discontinued       HPI  Swelling both LE  Recurrent  Used to take diuretic years ago.  No redness, pain, warmth  Denies CP, SOB  Legs are good in AM and then symptoms worsen as day goes on.  Hx vein stripping in the past-- Dr Andrew did procedure in the past.    Hypertension  This is a chronic problem. The current episode started more than 1 year ago. Progression since onset: worse today. Condition status: borderline high. Associated symptoms include peripheral edema. Pertinent negatives include no blurred vision, chest pain, headaches, malaise/fatigue, palpitations or shortness of breath. There are no associated agents to hypertension. Past treatments include diuretics (not on currently).       The following have been reviewed and updated as appropriate in this visit:   Allergies  Meds  Problems           Review of System  Review of Systems   Constitutional: Negative.  Negative for malaise/fatigue.   Eyes: Negative for blurred vision.   Respiratory: Negative.  Negative for shortness of breath.    Cardiovascular: Positive for leg swelling. Negative for chest pain and palpitations.   Neurological: Negative for headaches.       Objective     Vitals  Vitals:    09/16/22 1255   BP: (!) 150/80   BP Location: Left upper arm   Patient Position: Sitting   Pulse: 98   Resp: 18   Temp: 36.9 °C (98.4 °F)   TempSrc: Temporal   SpO2: 97%   Weight: (!) 138 kg (303 lb 6.4 oz)   Height: 1.702 m (5' 7.01\")     Body mass " index is 47.51 kg/m².    Physical Exam  Physical Exam  Vitals reviewed.   Constitutional:       General: She is not in acute distress.     Appearance: Normal appearance. She is not diaphoretic.   Cardiovascular:      Rate and Rhythm: Normal rate and regular rhythm.      Heart sounds: No murmur heard.    No friction rub. No gallop.   Pulmonary:      Effort: Pulmonary effort is normal.      Breath sounds: Normal breath sounds. No wheezing, rhonchi or rales.   Musculoskeletal:      Right lower leg: Edema present.      Left lower leg: Edema present.      Comments: Trace- +1 edema B LE non pitting  No erythema, warmth  Non tender   Neurological:      Mental Status: She is alert and oriented to person, place, and time.         Assessment/Plan     Problem List Items Addressed This Visit     Essential hypertension, benign     BP up today  Restart HCTZ daily  Low salt diet  Push po fluids  Follow up 3 mo           Relevant Medications    hydrochlorothiazide (HYDRODIURIL) 25 mg tablet    Venous insufficiency of both lower extremities - Primary     Has seen Carlos Andrew in the past  Schedule follow up for reevaluation.           Relevant Orders    Ambulatory referral to Vascular Surgery    Edema of both lower legs     ? Venous insufficiency.  Elevate legs  HCTZ--restart.  Stay physically active.  Cardiac consult if persists           Relevant Medications    hydrochlorothiazide (HYDRODIURIL) 25 mg tablet              HAYDER James  9/16/2022

## 2022-09-16 NOTE — ASSESSMENT & PLAN NOTE
? Venous insufficiency.  Elevate legs  HCTZ--restart.  Stay physically active.  Cardiac consult if persists

## 2022-10-09 DIAGNOSIS — E11.65 TYPE 2 DIABETES MELLITUS WITH HYPERGLYCEMIA, WITHOUT LONG-TERM CURRENT USE OF INSULIN (CMS/HCC): ICD-10-CM

## 2022-10-09 LAB
ABO GROUP BLD: NORMAL
HBA1C MFR BLD: 7.2 % (ref 4.8–5.6)
RH BLD: POSITIVE

## 2022-10-10 RX ORDER — METFORMIN HYDROCHLORIDE 500 MG/1
TABLET, EXTENDED RELEASE ORAL
Qty: 360 TABLET | Refills: 1 | Status: SHIPPED | OUTPATIENT
Start: 2022-10-10 | End: 2023-04-10

## 2022-10-10 NOTE — TELEPHONE ENCOUNTER
Medicine Refill Request    Last Office: 9/16/2022   Last Consult Visit: Visit date not found  Last Telemedicine Visit: 3/16/2022 Meghan Cordova CRNP    Next Appointment: 10/21/2022      Current Outpatient Medications:     blood sugar diagnostic (ACCU-CHEK GUIDE TEST STRIPS) strip, Test 1-2 times daily, Disp: 100 strip, Rfl: 1    blood-glucose meter (ACCU-CHEK GUIDE GLUCOSE METER) misc, Test bid, Disp: 1 each, Rfl: 0    hydrochlorothiazide (HYDRODIURIL) 25 mg tablet, Take 1 tablet (25 mg total) by mouth daily., Disp: 90 tablet, Rfl: 0    lancets (ACCU-CHEK FASTCLIX LANCET DRUM) misc, Test blood sugar one to two times daily, Disp: 100 each, Rfl: 1    metFORMIN XR (GLUCOPHAGE-XR) 500 mg 24 hr tablet, TAKE 2 TABLETS TWICE A DAY, Disp: 14 tablet, Rfl: 0    RYBELSUS 7 mg tablet, TAKE 1 TABLET BY MOUTH EVERY DAY, Disp: 30 tablet, Rfl: 4      BP Readings from Last 3 Encounters:   09/16/22 (!) 150/80   08/26/22 130/80   08/03/22 126/76       Recent Lab results:  Lab Results   Component Value Date    CHOL 168 12/18/2021   ,   Lab Results   Component Value Date    HDL 44 12/18/2021   ,   Lab Results   Component Value Date    LDLCALC 103 (H) 12/18/2021   ,   Lab Results   Component Value Date    TRIG 117 12/18/2021        Lab Results   Component Value Date    GLUCOSE 113 (H) 12/18/2021   ,   Lab Results   Component Value Date    HGBA1C 7.2 (H) 10/08/2022         Lab Results   Component Value Date    CREATININE 0.66 12/18/2021       Lab Results   Component Value Date    TSH 3.050 12/18/2021

## 2022-10-21 ENCOUNTER — OFFICE VISIT (OUTPATIENT)
Dept: PRIMARY CARE | Facility: CLINIC | Age: 62
End: 2022-10-21
Payer: COMMERCIAL

## 2022-10-21 VITALS
DIASTOLIC BLOOD PRESSURE: 82 MMHG | OXYGEN SATURATION: 99 % | HEIGHT: 67 IN | TEMPERATURE: 98.2 F | WEIGHT: 287 LBS | HEART RATE: 98 BPM | RESPIRATION RATE: 18 BRPM | BODY MASS INDEX: 45.04 KG/M2 | SYSTOLIC BLOOD PRESSURE: 130 MMHG

## 2022-10-21 DIAGNOSIS — E11.9 TYPE 2 DIABETES MELLITUS WITHOUT COMPLICATION, WITHOUT LONG-TERM CURRENT USE OF INSULIN (CMS/HCC): Primary | ICD-10-CM

## 2022-10-21 DIAGNOSIS — I87.2 VENOUS INSUFFICIENCY OF BOTH LOWER EXTREMITIES: ICD-10-CM

## 2022-10-21 DIAGNOSIS — E66.01 MORBID OBESITY WITH BODY MASS INDEX (BMI) OF 45.0 TO 49.9 IN ADULT (CMS/HCC): ICD-10-CM

## 2022-10-21 DIAGNOSIS — I10 ESSENTIAL HYPERTENSION, BENIGN: ICD-10-CM

## 2022-10-21 DIAGNOSIS — C85.80 MARGINAL ZONE LYMPHOMA (CMS/HCC): ICD-10-CM

## 2022-10-21 PROBLEM — R60.0 EDEMA OF BOTH LOWER LEGS: Status: RESOLVED | Noted: 2022-09-16 | Resolved: 2022-10-21

## 2022-10-21 PROBLEM — M54.31 RIGHT SCIATIC NERVE PAIN: Status: RESOLVED | Noted: 2022-07-19 | Resolved: 2022-10-21

## 2022-10-21 PROCEDURE — 3008F BODY MASS INDEX DOCD: CPT | Performed by: NURSE PRACTITIONER

## 2022-10-21 PROCEDURE — 3079F DIAST BP 80-89 MM HG: CPT | Performed by: NURSE PRACTITIONER

## 2022-10-21 PROCEDURE — 99214 OFFICE O/P EST MOD 30 MIN: CPT | Performed by: NURSE PRACTITIONER

## 2022-10-21 PROCEDURE — 3075F SYST BP GE 130 - 139MM HG: CPT | Performed by: NURSE PRACTITIONER

## 2022-10-21 ASSESSMENT — PATIENT HEALTH QUESTIONNAIRE - PHQ9: SUM OF ALL RESPONSES TO PHQ9 QUESTIONS 1 & 2: 0

## 2022-10-21 ASSESSMENT — ENCOUNTER SYMPTOMS
HYPERTENSION: 1
PALPITATIONS: 0
SHORTNESS OF BREATH: 0
DIABETIC ASSOCIATED SYMPTOMS: 0
BLURRED VISION: 0
HEADACHES: 0

## 2022-10-21 ASSESSMENT — PAIN SCALES - GENERAL: PAINLEVEL: 0-NO PAIN

## 2022-10-21 NOTE — ASSESSMENT & PLAN NOTE
Latest Reference Range & Units 10/08/22 10:09   Hemoglobin A1C 4.8 - 5.6 % 7.2 (H)   (H): Data is abnormally high    #s up  Weight loss  Exercise  Add Jardiance 10mg daily.  Continue Metformin BID  Repeat A1C 3 mo then follow up.

## 2022-10-21 NOTE — PROGRESS NOTES
[facility] at [address]  [place] [address]  [phone]  Fax [phone]      Patient ID: Anh Rowan                              : 1960    Visit Date: 10/21/2022    Chief Complaint: Follow-up         Patient ID: Anh Rowan is a 62 y.o. female.    Patient Active Problem List   Diagnosis    Essential hypertension, benign    Fatty liver    Morbid obesity with body mass index (BMI) of 45.0 to 49.9 in adult (CMS/HCC)    Marginal zone lymphoma (CMS/HCC)    Type 2 diabetes mellitus without complication, without long-term current use of insulin (CMS/HCC)    Venous insufficiency of both lower extremities         Current Outpatient Medications:     blood sugar diagnostic (ACCU-CHEK GUIDE TEST STRIPS) strip, Test 1-2 times daily, Disp: 100 strip, Rfl: 1    blood-glucose meter (ACCU-CHEK GUIDE GLUCOSE METER) misc, Test bid, Disp: 1 each, Rfl: 0    empagliflozin (JARDIANCE) 10 mg tablet, Take 1 tablet (10 mg total) by mouth daily., Disp: 90 tablet, Rfl: 1    hydrochlorothiazide (HYDRODIURIL) 25 mg tablet, Take 1 tablet (25 mg total) by mouth daily., Disp: 90 tablet, Rfl: 0    lancets (ACCU-CHEK FASTCLIX LANCET DRUM) misc, Test blood sugar one to two times daily, Disp: 100 each, Rfl: 1    metFORMIN XR (GLUCOPHAGE-XR) 500 mg 24 hr tablet, TAKE 2 TABLETS BY MOUTH TWICE A DAY, Disp: 360 tablet, Rfl: 1    Allergies   Allergen Reactions    Clarithromycin Rash       Social History     Tobacco Use    Smoking status: Never    Smokeless tobacco: Never       Health Maintenance   Topic Date Due    Colorectal Cancer Screening  Never done    Zoster Vaccine (1 of 2) 2022 (Originally 1980)    COVID-19 Vaccine (1) 2022 (Originally 1960)    Influenza Vaccine (1) 10/21/2023 (Originally 2022)    Annual Dilated Retinal Exam  2022    Diabetes Kidney Health Evaluation: eGFR  2022    Diabetes Kidney  Health Evaluation:  uACR  12/18/2022    Diabetic Foot Exam  12/30/2022    Cervical Cancer Screening  03/07/2023    Breast Cancer Screening  08/09/2023    Hemoglobin A1C  10/08/2023    Hepatitis C Screening  Completed    Meningococcal ACWY  Aged Out    HIB Vaccines  Aged Out    IPV Vaccines  Aged Out    HPV Vaccines  Aged Out    DTaP, Tdap, and Td Vaccines  Discontinued    HIV Screening  Discontinued    Pneumococcal  Discontinued       HPI  Routine follow up     Diabetes  She presents for her follow-up diabetic visit. She has type 2 diabetes mellitus. Her disease course has been worsening. Pertinent negatives for hypoglycemia include no headaches. There are no diabetic associated symptoms. Pertinent negatives for diabetes include no blurred vision. Current diabetic treatment includes oral agent (monotherapy). She is compliant with treatment all of the time. Her weight is decreasing steadily. She is following a generally healthy and diabetic diet. Meal planning includes avoidance of concentrated sweets. She participates in exercise daily. An ACE inhibitor/angiotensin II receptor blocker is not being taken. She does not see a podiatrist.Eye exam is current.   Hypertension  This is a chronic problem. The current episode started more than 1 year ago. The problem is unchanged. The problem is controlled. Pertinent negatives include no blurred vision, headaches, palpitations, peripheral edema or shortness of breath. There are no associated agents to hypertension. Risk factors for coronary artery disease include obesity. Past treatments include diuretics. The current treatment provides significant improvement. There are no compliance problems.        The following have been reviewed and updated as appropriate in this visit:   Allergies  Meds  Problems         Review of System  Review of Systems   Eyes: Negative for blurred vision.   Respiratory: Negative for shortness of breath.    Cardiovascular: Negative for  "palpitations.   Neurological: Negative for headaches.       Objective     Vitals  Vitals:    10/21/22 1535   BP: 130/82   BP Location: Left upper arm   Patient Position: Sitting   Pulse: 98   Resp: 18   Temp: 36.8 °C (98.2 °F)   TempSrc: Temporal   SpO2: 99%   Weight: 130 kg (287 lb)   Height: 1.702 m (5' 7.01\")     Body mass index is 44.94 kg/m².      Physical Exam  Vitals reviewed.   Constitutional:       General: She is not in acute distress.     Appearance: Normal appearance. She is not diaphoretic.   Cardiovascular:      Rate and Rhythm: Normal rate and regular rhythm.      Heart sounds: No murmur heard.    No friction rub. No gallop.   Pulmonary:      Effort: Pulmonary effort is normal.      Breath sounds: Normal breath sounds. No wheezing, rhonchi or rales.   Musculoskeletal:      Right lower leg: No edema.      Left lower leg: No edema.   Neurological:      Mental Status: She is alert and oriented to person, place, and time.         Assessment/Plan     Problem List Items Addressed This Visit     Essential hypertension, benign     BP stable  Continue HCTZ  Follow up 3 months         Relevant Orders    CBC and Differential    Comprehensive metabolic panel    Morbid obesity with body mass index (BMI) of 45.0 to 49.9 in adult (CMS/HCC)     On Kayy Ulloa again  Lost 8 lbs so far.  Exercising more.         Marginal zone lymphoma (CMS/HCC)     Stable.  Dr Salcedo follows  Appt in Feb 2023 upcoming         Type 2 diabetes mellitus without complication, without long-term current use of insulin (CMS/[facility]) - Primary      Latest Reference Range & Units 10/08/22 10:09   Hemoglobin A1C 4.8 - 5.6 % 7.2 (H)   (H): Data is abnormally high    #s up  Weight loss  Exercise  Add Jardiance 10mg daily.  Continue Metformin BID  Repeat A1C 3 mo then follow up.         Relevant Medications    empagliflozin (JARDIANCE) 10 mg tablet    Other Relevant Orders    Lipid panel    Hemoglobin A1c    Microalbumin/Creatinine Ur Random    Venous " insufficiency of both lower extremities     Saw vein MD  Suggested compression stockings  Pt has Rx  Advised to try [place] [facility].                HAYDER James  10/21/2022

## 2022-11-16 ASSESSMENT — SLEEP AND FATIGUE QUESTIONNAIRES
HOW LIKELY ARE YOU TO NOD OFF OR FALL ASLEEP WHILE LYING DOWN TO REST IN THE AFTERNOON WHEN CIRCUMSTANCES PERMIT: NO CHANCE OF DOZING
HOW LIKELY ARE YOU TO NOD OFF OR FALL ASLEEP WHILE SITTING AND READING: NO CHANCE OF DOZING
HOW LIKELY ARE YOU TO NOD OFF OR FALL ASLEEP WHILE SITTING AND TALKING TO SOMEONE: NO CHANCE OF DOZING
HOW LIKELY ARE YOU TO NOD OFF OR FALL ASLEEP WHILE WATCHING TV: MODERATE CHANCE OF DOZING
HOW LIKELY ARE YOU TO NOD OFF OR FALL ASLEEP IN A CAR, WHILE STOPPED FOR A FEW MINUTES IN TRAFFIC: NO CHANCE OF DOZING
HOW LIKELY ARE YOU TO NOD OFF OR FALL ASLEEP WHEN YOU ARE A PASSENGER IN A CAR FOR AN HOUR WITHOUT A BREAK: NO CHANCE OF DOZING
SITING INACTIVE IN A PUBLIC PLACE LIKE A CLASS ROOM OR A MOVIE THEATER: NO CHANCE OF DOZING
ESS TOTAL SCORE: 2
HOW LIKELY ARE YOU TO NOD OFF OR FALL ASLEEP WHILE SITTING QUIETLY AFTER LUNCH WITHOUT ALCOHOL: NO CHANCE OF DOZING

## 2022-11-16 ASSESSMENT — ENCOUNTER SYMPTOMS
CONSTITUTIONAL NEGATIVE: 1
ENDOCRINE NEGATIVE: 1
PSYCHIATRIC NEGATIVE: 1
NEUROLOGICAL NEGATIVE: 1
ARTHRALGIAS: 1
RESPIRATORY NEGATIVE: 1
CARDIOVASCULAR NEGATIVE: 1
HEMATOLOGIC/LYMPHATIC NEGATIVE: 1
GASTROINTESTINAL NEGATIVE: 1

## 2022-11-16 NOTE — PROGRESS NOTES
Patient ID: Anh Rowan                              : 1960  MRN: [id number]                                          PCP: Meghan Cordova CRNP  Visit Date: 2022      Subjective   History of Present Illness:    Anh Rowan is a 62 y.o. female presents to the Bariatric Surgery Program for consideration of weight loss surgery.  She has suffered from severe morbid obesity through out their entire life and has been unable to lose weight despite multiple attempts at weight loss, steadily gaining weight since then.    Her initial point of contact was word of mouth  Currently they are employed by [employer] as a .    She is interested in a Lap vertical sleeve gastrectomy  procedure. We have recommended Lap vertical sleeve gastrectomy and Lap Sammy-en-Y gastric bypass because of DM.      Their [place] scale is 2 and STOP-Bang Total Score: 4.    Pertinent PMH includes the following :None     She has T2DM on oral meds for 1 year. Her Hgb A1c is 6 from 11.   She has fatty liver.      Past Medical History: She  has a past medical history of Abnormal LFTs (2020), Morbid obesity (CMS/HCC), and Type 2 diabetes mellitus (CMS/HCC) (21)..  Past Surgical History: She  has a past surgical history that includes Appendectomy;  section (00); Tonsillectomy (); and Knee surgery..  Medication:   Current Outpatient Medications:     blood sugar diagnostic (ACCU-CHEK GUIDE TEST STRIPS) strip, Test 1-2 times daily, Disp: 100 strip, Rfl: 1    blood-glucose meter (ACCU-CHEK GUIDE GLUCOSE METER) misc, Test bid, Disp: 1 each, Rfl: 0    empagliflozin (JARDIANCE) 10 mg tablet, Take 1 tablet (10 mg total) by mouth daily., Disp: 90 tablet, Rfl: 1    hydrochlorothiazide (HYDRODIURIL) 25 mg tablet, Take 1 tablet (25 mg total) by mouth daily., Disp: 90 tablet, Rfl: 0    lancets (ACCU-CHEK FASTCLIX LANCET DRUM) misc, Test blood sugar one to two times daily, Disp: 100 each, Rfl:  "1    metFORMIN XR (GLUCOPHAGE-XR) 500 mg 24 hr tablet, TAKE 2 TABLETS BY MOUTH TWICE A DAY, Disp: 360 tablet, Rfl: 1   Taking:   Allergies: She is allergic to clarithromycin..    Nutrition:  The patient has failed multiple attempts at weight loss through diet and exercise and has been unsuccessful with weight loss on their own.  The largest amount of weight lost was 100  lbs with Kayy Ulloa.  Since that time, the patient has regained their weight. Patient has attempted to lose weight with diet pills.  Anh's eating habits include:Excessive snacking  Large Portions.   Anh's food preferences include carbohydrates, salty foods, diet soda and poor food choices.   See dietician documentation for complete history.    Exercise:  Anh has ambulatory physical limitations.  Anh reports they exercise daily  She is walking  Performance status includes ability to walk several blocks and climb several  Flight(s) of stairs.    Social History:   Patient's social history reviewed.   Social History     Tobacco Use   Smoking Status Former   Smokeless Tobacco Never     Social History     Substance and Sexual Activity   Alcohol Use Never     Social History     Substance and Sexual Activity   Drug Use Never       Review of Systems:  Review of Systems   Constitutional: Negative.    Respiratory: Negative.    Cardiovascular: Negative.    Gastrointestinal: Negative.    Endocrine: Negative.    Musculoskeletal: Positive for arthralgias.   Skin: Negative.    Neurological: Negative.    Hematological: Negative.    Psychiatric/Behavioral: Negative.        Vitals:  Her  height is 1.651 m (5' 5\") and weight is 130 kg (286 lb 3.2 oz). Her temporal temperature is 37.4 °C (99.4 °F). Her blood pressure is 139/89 and her pulse is 108 (abnormal). Her oxygen saturation is 96%.  body mass index is 47.63 kg/m².  Weight trend:   Wt Readings from Last 5 Encounters:   11/18/22 130 kg (286 lb 3.2 oz)   10/21/22 130 kg (287 lb)   09/16/22 (!) 138 kg (303 " lb 6.4 oz)   08/03/22 133 kg (293 lb)   03/30/22 123 kg (271 lb 12.8 oz)       Physical Exam:   Physical Exam  Vitals and nursing note reviewed.   Constitutional:       General: She is not in acute distress.     Appearance: She is well-developed and well-nourished. She is not diaphoretic.   HENT:      Head: Normocephalic and atraumatic.      Nose: Nose normal.   Eyes:      General: No scleral icterus.     Extraocular Movements: EOM normal.      Pupils: Pupils are equal, round, and reactive to light.   Neck:      Vascular: No JVD.      Trachea: No tracheal deviation.   Cardiovascular:      Rate and Rhythm: Normal rate.      Heart sounds: Normal heart sounds.   Pulmonary:      Effort: Pulmonary effort is normal. No respiratory distress.      Breath sounds: No stridor.   Chest:      Chest wall: No tenderness.   Abdominal:      Palpations: Abdomen is soft.      Tenderness: There is no abdominal tenderness.   Musculoskeletal:         General: Normal range of motion.      Cervical back: Normal range of motion and neck supple.   Skin:     General: Skin is warm and dry.   Neurological:      Mental Status: She is alert and oriented to person, place, and time.   Psychiatric:         Mood and Affect: Mood and affect normal.         Behavior: Behavior normal.         Thought Content: Thought content normal.         Patient Active Problem List   Diagnosis    Essential hypertension, benign    Fatty liver    Morbid obesity with body mass index (BMI) of 45.0 to 49.9 in adult (CMS/HCC)    Marginal zone lymphoma (CMS/HCC)    Type 2 diabetes mellitus without complication, without long-term current use of insulin (CMS/HCC)    Venous insufficiency of both lower extremities         Assessment/Plan   Anh Rowan is a 62 y.o. female with medically complicated obesity, with a body weight of 130 kg (286 lb 3.2 oz), Body mass index is 47.63 kg/m²..    I have asked Anh to obtain old medical records (including diet, exercise and  weight history) from their PCP, weight loss centers and other physicians to help document the extreme and severe nature of their weight and associated health problems. I have personally reviewed all records we received including 6 months of weight loss attempts. I have also personally reviewed SF-12, [place] Sleepiness scale, STOP BANG test, Quality of Life index performed in our office.    She has been counseled that we recommend lifelong avoidance of NSAIDs and aspirin, unless their use is medically necessary used as directed under another medical provider.    I have has an extensive discussion with Anh about all of the various types of bariatric procedures available to them. We have had an extensive discussion regarding their choice of a procedure Lap vertical sleeve gastrectomy and Lap Sammy-en-Y gastric bypass and my recommendation of Lap vertical sleeve gastrectomy and Lap Sammy-en-Y gastric bypass. I think she is a good candidate for this surgery, and she is interested in a Lap vertical sleeve gastrectomy and Lap Sammy-en-Y gastric bypass.    I have counseled this patient on multidisciplinary weight loss management, proper nutrition, diet, exercise and behavior modification.    I explained in detail the procedures that we are performing.  All of those procedures can be performed laparoscopically but there is a chance to convert to open if any technical challenges or complications do occur.  Bariatric surgery is not cosmetic surgery but rather a tool to help a patient make a life-long commitment lifestyle changes including diet, exercise, behavior changes, and taking supplemental vitamins and minerals. Problems after surgery may require more operations to correct them.  Patient will need to lose weight prior to surgery.    We have discussed the importance of learning about all aspects of the bariatric process as well as the importance of post-operative supplements and follow up.    The patient understands the  surgical procedures and the different surgical options that are available.  She understands the lifestyle changes that would be required after surgery and has agreed to participate in a pre-operative and postoperative weight management program.  She also expressed understanding of possible risks, had several questions answered and desires to proceed.  The patient should continue to attempt to make changes in their lifestyle, eating habits and exercise to lose weight, achieve a healthy lifestyle in preparation for future bariatric surgery.    Patient will have evaluations and follow up with bariatric dieticians and a psychologist and physical therapist before undergoing a multidisciplinary review of her candidacy.  We also discussed the weight loss requirement and rationale, and other program requirements.    We have discussed the very important need of portion control, good food selection, healthy eating habits, and avoidance of all of the following: snacks, high caloric foods, alcohol, soda, high caloric drinks, chips, fast foods, candy and high carbohydrate foods. We have discussed how the bariatric procedure will not establish the discipline or control to make good dietary choices.  We discussed how a dietician will assist them in creating a healthy diet.    We have discussed the importance of daily exercise in order for the bariatric procedure to be effective.  We discussed how the bariatric procedure will not motivate them to exercise.  We discussed how a physical therapist will help them to create an appropriate exercise regimen.  We have discussed the importance of attendance at our regular educational classes both for gaining valuable knowledge but also how this may be required for insurance approval.  We discussed that we will attempt to help patients get insurance approval for their procedure but ultimately they are responsible for understanding and knowing all of their own personal insurance plan  requirements for approval and we cannot be held responsible for this.  We have discussed the importance of selection of the appropriate bariatric procedure for the first operation and that all additional subsequent operative interventions will be subject to a higher level of complications due to adhesions and scar tissue from previous operations.  We have discussed the importance of pre-operative weight loss to assist in laparoscopic access for the procedure and potential insurance requirements to demonstrate compliance.  We have discussed that this is an elective procedure and the timing of surgery will depend on completion of the entire program.    The risks, benefits, alternatives, and potential complications of all of the procedures were explained in detail including, but not limited to death, anesthesia and medication adverse effect/DVT, pulmonary embolism, trocar site/incisional hernia, wound infection, abdominal infection, bleeding, failure to lose weight or gain weight and change in body image, metabolic complications with calcium, thiamine, vitamin B12, folate, iron, and anemia.     Orders Placed This Encounter   Procedures    FLUOROSCOPY UPPER GI WITH AIR WITHOUT KUB     Standing Status:   Future     Standing Expiration Date:   11/18/2023     Order Specific Question:   Release to patient     Answer:   Immediate    CBC and differential     Standing Status:   Future     Number of Occurrences:   1     Standing Expiration Date:   11/18/2023     Order Specific Question:   Release to patient     Answer:   Immediate    Comprehensive metabolic panel     Standing Status:   Future     Number of Occurrences:   1     Standing Expiration Date:   11/18/2023     Order Specific Question:   Release to patient     Answer:   Immediate    Ferritin     Standing Status:   Future     Number of Occurrences:   1     Standing Expiration Date:   11/18/2023     Order Specific Question:   Release to patient     Answer:   Immediate     Folate     Standing Status:   Future     Number of Occurrences:   1     Standing Expiration Date:   11/18/2023     Order Specific Question:   Release to patient     Answer:   Immediate    Hemoglobin A1c     Standing Status:   Future     Number of Occurrences:   1     Standing Expiration Date:   11/18/2023     Order Specific Question:   Release to patient     Answer:   Immediate    H. Pylori, Urea Breath Test     Standing Status:   Future     Number of Occurrences:   1     Standing Expiration Date:   11/18/2023     Order Specific Question:   Release to patient     Answer:   Immediate    Iron and TIBC     Standing Status:   Future     Number of Occurrences:   1     Standing Expiration Date:   11/18/2023     Order Specific Question:   Release to patient     Answer:   Immediate    Lipid panel     Standing Status:   Future     Number of Occurrences:   1     Standing Expiration Date:   11/18/2023     Order Specific Question:   Release to patient     Answer:   Immediate    Prealbumin     Standing Status:   Future     Number of Occurrences:   1     Standing Expiration Date:   11/18/2023     Order Specific Question:   Release to patient     Answer:   Immediate    PTH, intact     Standing Status:   Future     Number of Occurrences:   1     Standing Expiration Date:   11/18/2023     Order Specific Question:   Release to patient     Answer:   Immediate    TSH 3rd generation     Standing Status:   Future     Number of Occurrences:   1     Standing Expiration Date:   11/18/2023     Order Specific Question:   Release to patient     Answer:   Immediate    Vitamin A     Standing Status:   Future     Number of Occurrences:   1     Standing Expiration Date:   11/18/2023     Order Specific Question:   Release to patient     Answer:   Immediate    Vitamin B1, whole blood ( thiamine)     Standing Status:   Future     Number of Occurrences:   1     Standing Expiration Date:   11/18/2023     Order Specific Question:   Release  to patient     Answer:   Immediate    Vitamin B12     Standing Status:   Future     Number of Occurrences:   1     Standing Expiration Date:   11/18/2023     Order Specific Question:   Release to patient     Answer:   Immediate    Vitamin D, 25- hydroxy     Standing Status:   Future     Number of Occurrences:   1     Standing Expiration Date:   11/18/2023     Order Specific Question:   Release to patient     Answer:   Immediate    Ambulatory referral to Sleep Medicine     If you are ordering a Sleep Study Procedure please place order   SLE3 -  POLYSOMNOGRAM or a similar appropriate sleep testing order.      Standing Status:   Future     Standing Expiration Date:   5/18/2023     Referral Priority:   Routine     Referral Type:   Consultation     Referral Reason:   Specialty Services Required     Number of Visits Requested:   10    Ambulatory referral to Nutrition Services       [facility]    To schedule an appointment please call [phone]    Locations                                                              Fax if needed   [facility]/[address]                                [phone]   [facility]                                                              [phone]   [place]                                                                    [phone]   [place]                                               [phone]       Patients -- Please bring this referral to your appointment.     Hemoglobin A1c       Date                     Value               Ref Range           Status                10/08/2022               7.2 (H)             4.8 - 5.6 %         Final              Comment:             Prediabetes: 5.7 - 6.4             Diabetes: >6.4             Glycemic control for adults with diabetes: <7.0      ----------    Body mass index is 47.63 kg/m².         Standing Status:   Future     Standing Expiration Date:   5/18/2023      Referral Priority:   Routine     Referral Type:   Consultation     Referral Reason:   Specialty Services Required     Requested Specialty:   Nutrition     Number of Visits Requested:   1    Ambulatory referral to Psychology     Standing Status:   Future     Standing Expiration Date:   5/18/2023     Referral Priority:   Routine     Referral Type:   Psychiatric     Referral Reason:   Specialty Services Required     Requested Specialty:   Psychology     Number of Visits Requested:   1    Ambulatory Referral to Cardiac Rehab (Exercise)     Scheduling Instructions:      **EMERGENCY ORDERS**      -  Use ACLS protocols in case of emergency      -  Activate and follow Code Blue guidelines      -  Nitroglycerin GR 1/150 SL.PRN-chest pain      - Follow approved emergency treatment protocols      - Call Emergency Response Team, or 911 if ambulatory, if patient is unstable      - Implement diabetic protocol for monitoring safe blood sugar levels     Order Specific Question:   Enter patient into the Outpatient Cardiac Rehab Service     Answer:   Yes     Order Specific Question:   Nurse will complete initial interview, medical record review and cardiovascular assessment     Answer:   Yes     Order Specific Question:   Perform 6 minute walk test as part of pre and post cardiac rehab to evaluate functional status     Answer:   Yes    Home sleep apnea test     Standing Status:   Future     Standing Expiration Date:   11/18/2023     Order Specific Question:   Where would you like to schedule this?     Answer:   [facility]     Order Specific Question:   Release to patient     Answer:   Immediate       I spent 64 minutes on this date of service performing the following activities: obtaining history, performing examination, entering orders, documenting, preparing for visit, obtaining / reviewing records, providing counseling and education, independently reviewing study/studies, communicating results and coordinating  care.        Jose Rafael Bryson MD 11/18/2022

## 2022-11-18 ENCOUNTER — OFFICE VISIT (OUTPATIENT)
Dept: BARIATRICS | Facility: CLINIC | Age: 62
End: 2022-11-18
Payer: COMMERCIAL

## 2022-11-18 VITALS
WEIGHT: 286.2 LBS | BODY MASS INDEX: 47.68 KG/M2 | DIASTOLIC BLOOD PRESSURE: 89 MMHG | OXYGEN SATURATION: 96 % | HEART RATE: 108 BPM | SYSTOLIC BLOOD PRESSURE: 139 MMHG | HEIGHT: 65 IN | TEMPERATURE: 99.4 F

## 2022-11-18 DIAGNOSIS — Z13.21 ENCOUNTER FOR VITAMIN DEFICIENCY SCREENING: ICD-10-CM

## 2022-11-18 DIAGNOSIS — E44.1 PROTEIN-CALORIE MALNUTRITION, MILD (CMS/HCC): ICD-10-CM

## 2022-11-18 DIAGNOSIS — Z13.228 ENCOUNTER FOR SCREENING FOR METABOLIC DISORDER: ICD-10-CM

## 2022-11-18 DIAGNOSIS — Z13.21 ENCOUNTER FOR SCREENING FOR NUTRITIONAL DISORDER: ICD-10-CM

## 2022-11-18 DIAGNOSIS — Z11.8 ENCOUNTER FOR SCREENING FOR OTHER INFECTIOUS AND PARASITIC DISEASES: ICD-10-CM

## 2022-11-18 DIAGNOSIS — E66.01 MORBID OBESITY (CMS/HCC): ICD-10-CM

## 2022-11-18 DIAGNOSIS — E55.9 VITAMIN D DEFICIENCY: ICD-10-CM

## 2022-11-18 PROCEDURE — 3079F DIAST BP 80-89 MM HG: CPT | Performed by: SURGERY

## 2022-11-18 PROCEDURE — 3075F SYST BP GE 130 - 139MM HG: CPT | Performed by: SURGERY

## 2022-11-18 PROCEDURE — 99205 OFFICE O/P NEW HI 60 MIN: CPT | Performed by: SURGERY

## 2022-11-18 PROCEDURE — 3008F BODY MASS INDEX DOCD: CPT | Performed by: SURGERY

## 2022-12-01 ENCOUNTER — TELEPHONE (OUTPATIENT)
Dept: SLEEP MEDICINE | Facility: CLINIC | Age: 62
End: 2022-12-01
Payer: COMMERCIAL

## 2022-12-06 ENCOUNTER — TRANSCRIBE ORDERS (OUTPATIENT)
Dept: SCHEDULING | Age: 62
End: 2022-12-06

## 2022-12-06 DIAGNOSIS — M54.40 LUMBAGO WITH SCIATICA, UNSPECIFIED SIDE: Primary | ICD-10-CM

## 2022-12-06 DIAGNOSIS — M51.9 UNSPECIFIED THORACIC, THORACOLUMBAR AND LUMBOSACRAL INTERVERTEBRAL DISC DISORDER: ICD-10-CM

## 2022-12-06 DIAGNOSIS — M54.16 RADICULOPATHY, LUMBAR REGION: ICD-10-CM

## 2022-12-08 ENCOUNTER — HOSPITAL ENCOUNTER (OUTPATIENT)
Dept: RADIOLOGY | Facility: HOSPITAL | Age: 62
Discharge: HOME | End: 2022-12-08
Attending: PHYSICAL MEDICINE & REHABILITATION
Payer: COMMERCIAL

## 2022-12-08 ENCOUNTER — TELEMEDICINE (OUTPATIENT)
Dept: PRIMARY CARE | Facility: CLINIC | Age: 62
End: 2022-12-08
Payer: COMMERCIAL

## 2022-12-08 ENCOUNTER — TELEPHONE (OUTPATIENT)
Dept: SLEEP MEDICINE | Facility: CLINIC | Age: 62
End: 2022-12-08
Payer: COMMERCIAL

## 2022-12-08 DIAGNOSIS — J01.00 ACUTE NON-RECURRENT MAXILLARY SINUSITIS: Primary | ICD-10-CM

## 2022-12-08 DIAGNOSIS — M54.16 RADICULOPATHY, LUMBAR REGION: ICD-10-CM

## 2022-12-08 DIAGNOSIS — R05.1 ACUTE COUGH: ICD-10-CM

## 2022-12-08 DIAGNOSIS — M51.9 UNSPECIFIED THORACIC, THORACOLUMBAR AND LUMBOSACRAL INTERVERTEBRAL DISC DISORDER: ICD-10-CM

## 2022-12-08 DIAGNOSIS — M54.40 LUMBAGO WITH SCIATICA, UNSPECIFIED SIDE: ICD-10-CM

## 2022-12-08 PROBLEM — M47.816 LUMBAR FACET ARTHROPATHY: Status: ACTIVE | Noted: 2022-12-08

## 2022-12-08 PROCEDURE — 99212 OFFICE O/P EST SF 10 MIN: CPT | Mod: 95 | Performed by: NURSE PRACTITIONER

## 2022-12-08 RX ORDER — AMOXICILLIN 875 MG/1
875 TABLET, FILM COATED ORAL 2 TIMES DAILY
Qty: 20 TABLET | Refills: 0 | Status: SHIPPED | OUTPATIENT
Start: 2022-12-08 | End: 2022-12-18

## 2022-12-08 RX ORDER — CELECOXIB 200 MG/1
CAPSULE ORAL
COMMUNITY
Start: 2022-12-06 | End: 2024-12-03 | Stop reason: ALTCHOICE

## 2022-12-08 RX ORDER — BENZONATATE 200 MG/1
200 CAPSULE ORAL 3 TIMES DAILY PRN
Qty: 30 CAPSULE | Refills: 0 | Status: SHIPPED | OUTPATIENT
Start: 2022-12-08 | End: 2022-12-18

## 2022-12-08 ASSESSMENT — ENCOUNTER SYMPTOMS
HOARSE VOICE: 0
NECK PAIN: 0
SHORTNESS OF BREATH: 0
SORE THROAT: 0
HEADACHES: 1
SWOLLEN GLANDS: 0
DIAPHORESIS: 0
CHILLS: 0
SINUS COMPLAINT: 1
COUGH: 1
SINUS PRESSURE: 1

## 2022-12-08 NOTE — LETTER
December 8, 2022     Patient: Anh Rowan  YOB: 1960  Date of Visit: 12/8/2022    To Whom it May Concern:    Anh Rowan was seen in my clinic on 12/8/2022 at 4:00 pm. Please excuse Anh for her absence from work through Friday 12/9/2022 due to illness.    If you have any questions or concerns, please don't hesitate to call.         Sincerely,         HAYDER James        CC: No Recipients

## 2022-12-08 NOTE — PROGRESS NOTES
Verification of Patient Location:  The patient affirms they are currently located in the following state: [place]    Request for Consent:    Audio and Video Encounter   Hello, my name is HAYDER James.  Before we proceed, can you please verify your identification by telling me your full name and date of birth?  Can you tell me who is in the room with you?    You and I are about to have a telemedicine check-in or visit because you have requested it.  This is a live video-conference.  I am a real person, speaking to you in real time.  There is no one else with me on the video-conference. I am not recording this conversation and I am asking you not to record it.  This telemedicine visit will be billed to your health insurance or you, if you are self-insured.  You understand you will be responsible for any copayments or coinsurances that apply to your telemedicine visit.  Communication platform used for this encounter:  P[employer]ido Video Visit (Epic Video Client)       Before starting our telemedicine visit, I am required to get your consent for this virtual check-in or visit by telemedicine. Do you consent?      Patient Response to Request for Consent:  Yes    Patient Active Problem List   Diagnosis   • Essential hypertension, benign   • Fatty liver   • Morbid obesity with body mass index (BMI) of 45.0 to 49.9 in adult (CMS/HCC)   • Marginal zone lymphoma (CMS/HCC)   • Type 2 diabetes mellitus without complication, without long-term current use of insulin (CMS/HCC)   • Acute non-recurrent maxillary sinusitis   • Venous insufficiency of both lower extremities   • Lumbar disc disorder   • Lumbar facet arthropathy   • Acute cough     Current Outpatient Medications on File Prior to Visit   Medication Sig Dispense Refill   • celecoxib (celeBREX) 200 mg capsule daily.     • blood sugar diagnostic (ACCU-CHEK GUIDE TEST STRIPS) strip Test 1-2 times daily 100 strip 1   • blood-glucose meter (ACCU-CHEK GUIDE GLUCOSE  METER) misc Test bid 1 each 0   • empagliflozin (JARDIANCE) 10 mg tablet Take 1 tablet (10 mg total) by mouth daily. 90 tablet 1   • hydrochlorothiazide (HYDRODIURIL) 25 mg tablet Take 1 tablet (25 mg total) by mouth daily. 90 tablet 0   • lancets (ACCU-CHEK FASTCLIX LANCET DRUM) misc Test blood sugar one to two times daily 100 each 1   • metFORMIN XR (GLUCOPHAGE-XR) 500 mg 24 hr tablet TAKE 2 TABLETS BY MOUTH TWICE A  tablet 1     No current facility-administered medications on file prior to visit.     Allergies   Allergen Reactions   • Clarithromycin Rash     Social History     Tobacco Use   • Smoking status: Former   • Smokeless tobacco: Never   Substance Use Topics   • Alcohol use: Never   • Drug use: Never     Health Maintenance   Topic Date Due   • Colorectal Cancer Screening  Never done   • Annual Dilated Retinal Exam  11/16/2022   • Zoster Vaccine (1 of 2) 12/29/2022 (Originally 5/23/1980)   • COVID-19 Vaccine (1) 12/31/2022 (Originally 1960)   • Influenza Vaccine (1) 10/21/2023 (Originally 8/1/2022)   • Diabetes Kidney Health Evaluation: eGFR  12/18/2022   • Diabetes Kidney Health Evaluation:  uACR  12/18/2022   • Diabetic Foot Exam  12/30/2022   • Cervical Cancer Screening  03/07/2023   • Breast Cancer Screening  08/09/2023   • Hemoglobin A1C  10/08/2023   • Depression Screening  10/21/2023   • Hepatitis C Screening  Completed   • Meningococcal ACWY  Aged Out   • HIB Vaccines  Aged Out   • Hepatitis B Vaccines  Aged Out   • IPV Vaccines  Aged Out   • HPV Vaccines  Aged Out   • DTaP, Tdap, and Td Vaccines  Discontinued   • HIV Screening  Discontinued   • Pneumococcal  Discontinued       Visit Documentation:  Subjective     Patient ID: Anh Rowan is a 62 y.o. female.  1960      Earache, headache, congestion.  Onset earlier this week.  Painful sinuses  Yellow and green  Using Motrin.  No fever, chills, body aches.    Sinus Problem  This is a new problem. The current episode started in  the past 7 days. The problem has been rapidly worsening since onset. There has been no fever. The pain is moderate. Associated symptoms include congestion, coughing, ear pain, headaches and sinus pressure. Pertinent negatives include no chills, diaphoresis, hoarse voice, neck pain, shortness of breath, sneezing, sore throat or swollen glands. Treatments tried: NSAIDs. The treatment provided no relief.       The following have been reviewed and updated as appropriate in this visit:   Allergies  Meds  Problems       Review of Systems   Constitutional: Negative for chills and diaphoresis.   HENT: Positive for congestion, ear pain and sinus pressure. Negative for hoarse voice, sneezing and sore throat.    Respiratory: Positive for cough. Negative for shortness of breath.    Musculoskeletal: Negative for neck pain.   Neurological: Positive for headaches.       Physical Exam  Constitutional:       General: She is not in acute distress.     Appearance: Normal appearance. She is ill-appearing. She is not toxic-appearing or diaphoretic.   HENT:      Nose: Congestion present. No rhinorrhea.      Right Sinus: Maxillary sinus tenderness present.      Left Sinus: Maxillary sinus tenderness present.      Comments: Per patient exam  Pulmonary:      Effort: Pulmonary effort is normal. No respiratory distress.      Breath sounds: No stridor. No wheezing.      Comments: Dry severe cough noted  Neurological:      Mental Status: She is alert and oriented to person, place, and time.   Psychiatric:         Mood and Affect: Mood normal.         Speech: Speech normal.         Behavior: Behavior normal.         Assessment/Plan   Diagnoses and all orders for this visit:    Acute non-recurrent maxillary sinusitis (Primary)  Assessment & Plan:  Amox BID #20  Mucinex PRN  Saline NS PRN  Push po fluids  Follow up if symptoms worsen/persist.      Orders:  -     amoxicillin (AMOXIL) 875 mg tablet; Take 1 tablet (875 mg total) by mouth 2 (two)  times a day for 10 days.    Acute cough  -     benzonatate (TESSALON) 200 mg capsule; Take 1 capsule (200 mg total) by mouth 3 (three) times a day as needed for cough for up to 10 days.      Time Spent:  I spent 15 minutes on this date of service performing the following activities: obtaining history, performing examination, entering orders, documenting, preparing for visit, obtaining / reviewing records and providing counseling and education.

## 2022-12-12 DIAGNOSIS — R60.0 EDEMA OF BOTH LOWER LEGS: ICD-10-CM

## 2022-12-12 RX ORDER — HYDROCHLOROTHIAZIDE 25 MG/1
25 TABLET ORAL DAILY
Qty: 90 TABLET | Refills: 0 | Status: SHIPPED | OUTPATIENT
Start: 2022-12-12 | End: 2023-06-01 | Stop reason: ALTCHOICE

## 2022-12-12 NOTE — TELEPHONE ENCOUNTER
Medicine Refill Request    Last Office: 10/21/2022   Last Consult Visit: Visit date not found  Last Telemedicine Visit: 12/8/2022 Meghan Cordova CRNP    Next Appointment: 1/13/2023      Current Outpatient Medications:   •  amoxicillin (AMOXIL) 875 mg tablet, Take 1 tablet (875 mg total) by mouth 2 (two) times a day for 10 days., Disp: 20 tablet, Rfl: 0  •  benzonatate (TESSALON) 200 mg capsule, Take 1 capsule (200 mg total) by mouth 3 (three) times a day as needed for cough for up to 10 days., Disp: 30 capsule, Rfl: 0  •  blood sugar diagnostic (ACCU-CHEK GUIDE TEST STRIPS) strip, Test 1-2 times daily, Disp: 100 strip, Rfl: 1  •  blood-glucose meter (ACCU-CHEK GUIDE GLUCOSE METER) misc, Test bid, Disp: 1 each, Rfl: 0  •  celecoxib (celeBREX) 200 mg capsule, daily., Disp: , Rfl:   •  empagliflozin (JARDIANCE) 10 mg tablet, Take 1 tablet (10 mg total) by mouth daily., Disp: 90 tablet, Rfl: 1  •  hydrochlorothiazide (HYDRODIURIL) 25 mg tablet, Take 1 tablet (25 mg total) by mouth daily., Disp: 90 tablet, Rfl: 0  •  lancets (ACCU-CHEK FASTCLIX LANCET DRUM) misc, Test blood sugar one to two times daily, Disp: 100 each, Rfl: 1  •  metFORMIN XR (GLUCOPHAGE-XR) 500 mg 24 hr tablet, TAKE 2 TABLETS BY MOUTH TWICE A DAY, Disp: 360 tablet, Rfl: 1      BP Readings from Last 3 Encounters:   11/18/22 139/89   10/21/22 130/82   09/16/22 (!) 150/80       Recent Lab results:  Lab Results   Component Value Date    CHOL 168 12/18/2021   ,   Lab Results   Component Value Date    HDL 44 12/18/2021   ,   Lab Results   Component Value Date    LDLCALC 103 (H) 12/18/2021   ,   Lab Results   Component Value Date    TRIG 117 12/18/2021        Lab Results   Component Value Date    GLUCOSE 113 (H) 12/18/2021   ,   Lab Results   Component Value Date    HGBA1C 7.2 (H) 10/08/2022         Lab Results   Component Value Date    CREATININE 0.66 12/18/2021       Lab Results   Component Value Date    TSH 3.050 12/18/2021

## 2023-01-08 LAB
ALBUMIN SERPL-MCNC: 4.6 G/DL (ref 3.8–4.8)
ALBUMIN/CREAT UR: 15 MG/G CREAT (ref 0–29)
ALBUMIN/GLOB SERPL: 1.6 {RATIO} (ref 1.2–2.2)
ALP SERPL-CCNC: 74 IU/L (ref 44–121)
ALT SERPL-CCNC: 40 IU/L (ref 0–32)
AST SERPL-CCNC: 47 IU/L (ref 0–40)
BASOPHILS # BLD AUTO: 0 X10E3/UL (ref 0–0.2)
BASOPHILS NFR BLD AUTO: 0 %
BILIRUB SERPL-MCNC: 0.5 MG/DL (ref 0–1.2)
BUN SERPL-MCNC: 13 MG/DL (ref 8–27)
BUN/CREAT SERPL: 16 (ref 12–28)
CALCIUM SERPL-MCNC: 9.8 MG/DL (ref 8.7–10.3)
CHLORIDE SERPL-SCNC: 100 MMOL/L (ref 96–106)
CHOLEST SERPL-MCNC: 170 MG/DL (ref 100–199)
CO2 SERPL-SCNC: 26 MMOL/L (ref 20–29)
CREAT SERPL-MCNC: 0.82 MG/DL (ref 0.57–1)
CREAT UR-MCNC: 138.5 MG/DL
EGFRCR SERPLBLD CKD-EPI 2021: 81 ML/MIN/1.73
EOSINOPHIL # BLD AUTO: 0.2 X10E3/UL (ref 0–0.4)
EOSINOPHIL NFR BLD AUTO: 2 %
ERYTHROCYTE [DISTWIDTH] IN BLOOD BY AUTOMATED COUNT: 13.3 % (ref 11.7–15.4)
GLOBULIN SER CALC-MCNC: 2.8 G/DL (ref 1.5–4.5)
GLUCOSE SERPL-MCNC: 146 MG/DL (ref 70–99)
HBA1C MFR BLD: 6.7 % (ref 4.8–5.6)
HCT VFR BLD AUTO: 42.2 % (ref 34–46.6)
HDLC SERPL-MCNC: 45 MG/DL
HGB BLD-MCNC: 13.5 G/DL (ref 11.1–15.9)
IMM GRANULOCYTES # BLD AUTO: 0.1 X10E3/UL (ref 0–0.1)
IMM GRANULOCYTES NFR BLD AUTO: 1 %
LDLC SERPL CALC-MCNC: 104 MG/DL (ref 0–99)
LYMPHOCYTES # BLD AUTO: 2.3 X10E3/UL (ref 0.7–3.1)
LYMPHOCYTES NFR BLD AUTO: 23 %
MCH RBC QN AUTO: 26.2 PG (ref 26.6–33)
MCHC RBC AUTO-ENTMCNC: 32 G/DL (ref 31.5–35.7)
MCV RBC AUTO: 82 FL (ref 79–97)
MICROALBUMIN UR-MCNC: 20.5 UG/ML
MONOCYTES # BLD AUTO: 0.7 X10E3/UL (ref 0.1–0.9)
MONOCYTES NFR BLD AUTO: 7 %
NEUTROPHILS # BLD AUTO: 6.6 X10E3/UL (ref 1.4–7)
NEUTROPHILS NFR BLD AUTO: 67 %
PLATELET # BLD AUTO: 313 X10E3/UL (ref 150–450)
POTASSIUM SERPL-SCNC: 5.2 MMOL/L (ref 3.5–5.2)
PROT SERPL-MCNC: 7.4 G/DL (ref 6–8.5)
RBC # BLD AUTO: 5.16 X10E6/UL (ref 3.77–5.28)
SODIUM SERPL-SCNC: 140 MMOL/L (ref 134–144)
TRIGL SERPL-MCNC: 114 MG/DL (ref 0–149)
VLDLC SERPL CALC-MCNC: 21 MG/DL (ref 5–40)
WBC # BLD AUTO: 9.9 X10E3/UL (ref 3.4–10.8)

## 2023-01-13 ENCOUNTER — OFFICE VISIT (OUTPATIENT)
Dept: PRIMARY CARE | Facility: CLINIC | Age: 63
End: 2023-01-13
Payer: COMMERCIAL

## 2023-01-13 VITALS
RESPIRATION RATE: 18 BRPM | HEART RATE: 99 BPM | TEMPERATURE: 98.1 F | BODY MASS INDEX: 47.65 KG/M2 | OXYGEN SATURATION: 99 % | DIASTOLIC BLOOD PRESSURE: 84 MMHG | SYSTOLIC BLOOD PRESSURE: 128 MMHG | HEIGHT: 65 IN | WEIGHT: 286 LBS

## 2023-01-13 DIAGNOSIS — E11.9 TYPE 2 DIABETES MELLITUS WITHOUT COMPLICATION, WITHOUT LONG-TERM CURRENT USE OF INSULIN (CMS/HCC): Primary | ICD-10-CM

## 2023-01-13 DIAGNOSIS — C85.80 MARGINAL ZONE LYMPHOMA (CMS/HCC): ICD-10-CM

## 2023-01-13 DIAGNOSIS — E66.01 MORBID OBESITY WITH BODY MASS INDEX (BMI) OF 45.0 TO 49.9 IN ADULT (CMS/HCC): ICD-10-CM

## 2023-01-13 DIAGNOSIS — I10 ESSENTIAL HYPERTENSION, BENIGN: ICD-10-CM

## 2023-01-13 PROBLEM — J01.00 ACUTE NON-RECURRENT MAXILLARY SINUSITIS: Status: RESOLVED | Noted: 2020-12-10 | Resolved: 2023-01-13

## 2023-01-13 PROBLEM — R05.1 ACUTE COUGH: Status: RESOLVED | Noted: 2022-12-08 | Resolved: 2023-01-13

## 2023-01-13 PROCEDURE — 3008F BODY MASS INDEX DOCD: CPT | Performed by: NURSE PRACTITIONER

## 2023-01-13 PROCEDURE — 3079F DIAST BP 80-89 MM HG: CPT | Performed by: NURSE PRACTITIONER

## 2023-01-13 PROCEDURE — 3074F SYST BP LT 130 MM HG: CPT | Performed by: NURSE PRACTITIONER

## 2023-01-13 PROCEDURE — 99213 OFFICE O/P EST LOW 20 MIN: CPT | Performed by: NURSE PRACTITIONER

## 2023-01-13 ASSESSMENT — ENCOUNTER SYMPTOMS
HEADACHES: 0
ENDOCRINE NEGATIVE: 1
PALPITATIONS: 0
SHORTNESS OF BREATH: 0
DIABETIC ASSOCIATED SYMPTOMS: 0
CARDIOVASCULAR NEGATIVE: 1
RESPIRATORY NEGATIVE: 1
CONSTITUTIONAL NEGATIVE: 1
HYPERTENSION: 1
BLURRED VISION: 0

## 2023-01-13 ASSESSMENT — PATIENT HEALTH QUESTIONNAIRE - PHQ9: SUM OF ALL RESPONSES TO PHQ9 QUESTIONS 1 & 2: 0

## 2023-01-13 ASSESSMENT — PAIN SCALES - GENERAL: PAINLEVEL: 0-NO PAIN

## 2023-01-13 NOTE — ASSESSMENT & PLAN NOTE
Latest Reference Range & Units 01/07/23 09:52   Sodium 134 - 144 mmol/L 140   Potassium 3.5 - 5.2 mmol/L 5.2   Chloride 96 - 106 mmol/L 100   CO2 20 - 29 mmol/L 26   BUN 8 - 27 mg/dL 13   Creatinine 0.57 - 1.00 mg/dL 0.82   Glucose 70 - 99 mg/dL 146 (H)   Calcium 8.7 - 10.3 mg/dL 9.8   AST (SGOT) 0 - 40 IU/L 47 (H)   ALT (SGPT) 0 - 32 IU/L 40 (H)   Alkaline Phosphatase 44 - 121 IU/L 74   Total Protein 6.0 - 8.5 g/dL 7.4   Albumin 3.8 - 4.8 g/dL 4.6   Bilirubin, Total 0.0 - 1.2 mg/dL 0.5   eGFR >59 mL/min/1.73 81   Bun/Creatinine Ratio 12 - 28  16   Globulin 1.5 - 4.5 g/dL 2.8   Cholesterol 100 - 199 mg/dL 170   Triglycerides 0 - 149 mg/dL 114   HDL >39 mg/dL 45   LDL Calculated 0 - 99 mg/dL 104 (H)   (H): Data is abnormally high   Latest Reference Range & Units 01/07/23 09:52   Hemoglobin A1C 4.8 - 5.6 % 6.7 (H)   (H): Data is abnormally high   Latest Reference Range & Units 01/07/23 09:52   WBC 3.4 - 10.8 x10E3/uL 9.9   RBC 3.77 - 5.28 x10E6/uL 5.16   Hemoglobin 11.1 - 15.9 g/dL 13.5   Hematocrit 34.0 - 46.6 % 42.2   MCV 79 - 97 fL 82   MCH 26.6 - 33.0 pg 26.2 (L)   MCHC 31.5 - 35.7 g/dL 32.0   RDW 11.7 - 15.4 % 13.3   Platelets 150 - 450 x10E3/uL 313   Neutrophils, Absolute 1.4 - 7.0 x10E3/uL 6.6   (L): Data is abnormally low    Improved labs.  Continue meds  Repeat A1C 4 months  Follow up 6 mo  Eye exam 2/2023  Get copy of recent foot exam.  Colonoscopy--on wait list with Dr Foley to get scheduled.

## 2023-01-13 NOTE — PROGRESS NOTES
[facility] at [address]  [place] [address]  [phone]  Fax [phone]      Patient ID: Anh Rowan                              : 1960    Visit Date: 2023    Chief Complaint: Follow-up (Routine 3m visit )         Patient ID: Anh Rowan is a 62 y.o. female.    Patient Active Problem List   Diagnosis   • Essential hypertension, benign   • Fatty liver   • Morbid obesity with body mass index (BMI) of 45.0 to 49.9 in adult (CMS/HCC)   • Marginal zone lymphoma (CMS/HCC)   • Type 2 diabetes mellitus without complication, without long-term current use of insulin (CMS/HCC)   • Venous insufficiency of both lower extremities   • Lumbar disc disorder   • Lumbar facet arthropathy         Current Outpatient Medications:   •  blood sugar diagnostic (ACCU-CHEK GUIDE TEST STRIPS) strip, Test 1-2 times daily, Disp: 100 strip, Rfl: 1  •  blood-glucose meter (ACCU-CHEK GUIDE GLUCOSE METER) misc, Test bid, Disp: 1 each, Rfl: 0  •  celecoxib (celeBREX) 200 mg capsule, daily., Disp: , Rfl:   •  empagliflozin (JARDIANCE) 10 mg tablet, Take 1 tablet (10 mg total) by mouth daily., Disp: 90 tablet, Rfl: 1  •  hydrochlorothiazide (HYDRODIURIL) 25 mg tablet, TAKE 1 TABLET (25 MG TOTAL) BY MOUTH DAILY., Disp: 90 tablet, Rfl: 0  •  lancets (ACCU-CHEK FASTCLIX LANCET DRUM) misc, Test blood sugar one to two times daily, Disp: 100 each, Rfl: 1  •  metFORMIN XR (GLUCOPHAGE-XR) 500 mg 24 hr tablet, TAKE 2 TABLETS BY MOUTH TWICE A DAY, Disp: 360 tablet, Rfl: 1    Allergies   Allergen Reactions   • Clarithromycin Rash       Social History     Tobacco Use   • Smoking status: Former   • Smokeless tobacco: Never   Substance Use Topics   • Alcohol use: Never   • Drug use: Never       Health Maintenance   Topic Date Due   • Colorectal Cancer Screening  Never done   • Annual Dilated Retinal Exam  2022   • Diabetic Foot Exam  2022   • Influenza Vaccine  (1) 10/21/2023 (Originally 8/1/2022)   • Zoster Vaccine (1 of 2) 01/13/2024 (Originally 5/23/1980)   • COVID-19 Vaccine (1) 01/13/2024 (Originally 1960)   • Cervical Cancer Screening  03/07/2023   • Breast Cancer Screening  08/09/2023   • Diabetes Kidney Health Evaluation: eGFR  01/07/2024   • Diabetes Kidney Health Evaluation:  uACR  01/07/2024   • Hemoglobin A1C  01/07/2024   • Depression Screening  01/13/2024   • Hepatitis C Screening  Completed   • Meningococcal ACWY  Aged Out   • HIB Vaccines  Aged Out   • Hepatitis B Vaccines  Aged Out   • IPV Vaccines  Aged Out   • HPV Vaccines  Aged Out   • DTaP, Tdap, and Td Vaccines  Discontinued   • HIV Screening  Discontinued   • Pneumococcal  Discontinued       HPI  Routine follow up  Recent labs done.  Has an upcoming epidural injection for her back and she is very excited!    Diabetes  She presents for her follow-up diabetic visit. She has type 2 diabetes mellitus. Her disease course has been stable. There are no hypoglycemic associated symptoms. Pertinent negatives for hypoglycemia include no headaches. There are no diabetic associated symptoms. Pertinent negatives for diabetes include no blurred vision and no chest pain. There are no diabetic complications. Current diabetic treatment includes oral agent (dual therapy). She is compliant with treatment all of the time. She is following a generally healthy diet. Meal planning includes avoidance of concentrated sweets. She participates in exercise intermittently. There is no change in her home blood glucose trend. An ACE inhibitor/angiotensin II receptor blocker is not being taken. She sees a podiatrist.Eye exam is current.   Hypertension  This is a chronic problem. The current episode started more than 1 year ago. The problem is controlled. Pertinent negatives include no blurred vision, chest pain, headaches, palpitations, peripheral edema or shortness of breath. There are no associated agents to hypertension.  "Past treatments include diuretics and lifestyle changes. The current treatment provides significant improvement. There are no compliance problems.        The following have been reviewed and updated as appropriate in this visit:   Allergies  Meds  Problems         Review of System  Review of Systems   Constitutional: Negative.    Eyes: Negative for blurred vision.   Respiratory: Negative.  Negative for shortness of breath.    Cardiovascular: Negative.  Negative for chest pain and palpitations.   Endocrine: Negative.    Neurological: Negative for headaches.       Objective     Vitals  Vitals:    01/13/23 1538   BP: 128/84   BP Location: Left upper arm   Patient Position: Sitting   Pulse: 99   Resp: 18   Temp: 36.7 °C (98.1 °F)   TempSrc: Temporal   SpO2: 99%   Weight: 130 kg (286 lb)   Height: 1.651 m (5' 5\")     Body mass index is 47.59 kg/m².      Physical Exam  Vitals reviewed.   Constitutional:       General: She is not in acute distress.     Appearance: Normal appearance. She is not diaphoretic.   Cardiovascular:      Rate and Rhythm: Normal rate and regular rhythm.      Heart sounds: No murmur heard.    No friction rub. No gallop.   Pulmonary:      Effort: Pulmonary effort is normal.      Breath sounds: No wheezing, rhonchi or rales.   Musculoskeletal:      Right lower leg: No edema.      Left lower leg: No edema.   Neurological:      Mental Status: She is alert and oriented to person, place, and time.         Assessment/Plan     Problem List Items Addressed This Visit     Essential hypertension, benign     BP stable  Continue to monitor  Labs stable  HCTZ--uses a few times a week  Follow up 6  mo         Morbid obesity with body mass index (BMI) of 45.0 to 49.9 in adult (CMS/[facility])     Continue efforts to lose weight with diet and exercise.         Marginal zone lymphoma (CMS/[facility])     Oncology follows regularly--stable.         Type 2 diabetes mellitus without complication, without long-term current use of " insulin (CMS/[facility]) - Primary      Latest Reference Range & Units 01/07/23 09:52   Sodium 134 - 144 mmol/L 140   Potassium 3.5 - 5.2 mmol/L 5.2   Chloride 96 - 106 mmol/L 100   CO2 20 - 29 mmol/L 26   BUN 8 - 27 mg/dL 13   Creatinine 0.57 - 1.00 mg/dL 0.82   Glucose 70 - 99 mg/dL 146 (H)   Calcium 8.7 - 10.3 mg/dL 9.8   AST (SGOT) 0 - 40 IU/L 47 (H)   ALT (SGPT) 0 - 32 IU/L 40 (H)   Alkaline Phosphatase 44 - 121 IU/L 74   Total Protein 6.0 - 8.5 g/dL 7.4   Albumin 3.8 - 4.8 g/dL 4.6   Bilirubin, Total 0.0 - 1.2 mg/dL 0.5   eGFR >59 mL/min/1.73 81   Bun/Creatinine Ratio 12 - 28  16   Globulin 1.5 - 4.5 g/dL 2.8   Cholesterol 100 - 199 mg/dL 170   Triglycerides 0 - 149 mg/dL 114   HDL >39 mg/dL 45   LDL Calculated 0 - 99 mg/dL 104 (H)   (H): Data is abnormally high   Latest Reference Range & Units 01/07/23 09:52   Hemoglobin A1C 4.8 - 5.6 % 6.7 (H)   (H): Data is abnormally high   Latest Reference Range & Units 01/07/23 09:52   WBC 3.4 - 10.8 x10E3/uL 9.9   RBC 3.77 - 5.28 x10E6/uL 5.16   Hemoglobin 11.1 - 15.9 g/dL 13.5   Hematocrit 34.0 - 46.6 % 42.2   MCV 79 - 97 fL 82   MCH 26.6 - 33.0 pg 26.2 (L)   MCHC 31.5 - 35.7 g/dL 32.0   RDW 11.7 - 15.4 % 13.3   Platelets 150 - 450 x10E3/uL 313   Neutrophils, Absolute 1.4 - 7.0 x10E3/uL 6.6   (L): Data is abnormally low    Improved labs.  Continue meds  Repeat A1C 4 months  Follow up 6 mo  Eye exam 2/2023  Get copy of recent foot exam.  Colonoscopy--on wait list with Dr Foley to get scheduled.         Relevant Orders    Hemoglobin A1c           HAYDER James  1/13/2023

## 2023-02-10 ENCOUNTER — APPOINTMENT (OUTPATIENT)
Dept: URBAN - METROPOLITAN AREA CLINIC 203 | Age: 63
Setting detail: DERMATOLOGY
End: 2023-02-14

## 2023-02-10 DIAGNOSIS — L71.8 OTHER ROSACEA: ICD-10-CM

## 2023-02-10 DIAGNOSIS — C85.8 OTHER SPECIFIED TYPES OF NON-HODGKIN LYMPHOMA: ICD-10-CM

## 2023-02-10 DIAGNOSIS — L20.84 INTRINSIC (ALLERGIC) ECZEMA: ICD-10-CM

## 2023-02-10 PROBLEM — C85.80 OTHER SPECIFIED TYPES OF NON-HODGKIN LYMPHOMA, UNSPECIFIED SITE: Status: ACTIVE | Noted: 2023-02-10

## 2023-02-10 PROCEDURE — OTHER PRESCRIPTION MEDICATION MANAGEMENT: OTHER

## 2023-02-10 PROCEDURE — 99213 OFFICE O/P EST LOW 20 MIN: CPT

## 2023-02-10 PROCEDURE — OTHER PRESCRIPTION: OTHER

## 2023-02-10 PROCEDURE — OTHER COUNSELING: OTHER

## 2023-02-10 RX ORDER — BETAMETHASONE DIPROPIONATE 0.5 MG/G
CREAM, AUGMENTED TOPICAL BID
Qty: 50 | Refills: 4 | Status: ERX | COMMUNITY
Start: 2023-02-10

## 2023-02-10 RX ORDER — AZELAIC ACID 0.15 G/G
GEL TOPICAL QD
Qty: 50 | Refills: 3 | Status: ERX | COMMUNITY
Start: 2023-02-10

## 2023-02-10 ASSESSMENT — LOCATION DETAILED DESCRIPTION DERM
LOCATION DETAILED: RIGHT CENTRAL FRONTAL SCALP
LOCATION DETAILED: LEFT INFERIOR CENTRAL MALAR CHEEK
LOCATION DETAILED: RIGHT INFERIOR CENTRAL MALAR CHEEK
LOCATION DETAILED: LEFT CENTRAL MALAR CHEEK
LOCATION DETAILED: RIGHT CENTRAL MALAR CHEEK
LOCATION DETAILED: LEFT DISTAL PRETIBIAL REGION
LOCATION DETAILED: RIGHT DISTAL PRETIBIAL REGION
LOCATION DETAILED: RIGHT PROXIMAL PRETIBIAL REGION

## 2023-02-10 ASSESSMENT — LOCATION ZONE DERM
LOCATION ZONE: LEG
LOCATION ZONE: SCALP
LOCATION ZONE: FACE

## 2023-02-10 ASSESSMENT — LOCATION SIMPLE DESCRIPTION DERM
LOCATION SIMPLE: RIGHT CHEEK
LOCATION SIMPLE: RIGHT PRETIBIAL REGION
LOCATION SIMPLE: LEFT PRETIBIAL REGION
LOCATION SIMPLE: RIGHT SCALP
LOCATION SIMPLE: LEFT CHEEK

## 2023-02-10 NOTE — PROCEDURE: PRESCRIPTION MEDICATION MANAGEMENT
Detail Level: Zone
Plan: Patients scalp is clear at todays visit
Render In Strict Bullet Format?: No
Samples Given: La roche poscarlos weinberg and neutrogena
Continue Regimen: Betamethasone

## 2023-02-12 LAB
25(OH)D3+25(OH)D2 SERPL-MCNC: 9 NG/ML (ref 30–100)
ALBUMIN SERPL-MCNC: 4.4 G/DL (ref 3.8–4.8)
ALBUMIN/GLOB SERPL: 1.6 {RATIO} (ref 1.2–2.2)
ALP SERPL-CCNC: 75 IU/L (ref 44–121)
ALT SERPL-CCNC: 23 IU/L (ref 0–32)
AST SERPL-CCNC: 14 IU/L (ref 0–40)
BASOPHILS # BLD AUTO: 0 X10E3/UL (ref 0–0.2)
BASOPHILS NFR BLD AUTO: 0 %
BILIRUB SERPL-MCNC: 0.2 MG/DL (ref 0–1.2)
BUN SERPL-MCNC: 16 MG/DL (ref 8–27)
BUN/CREAT SERPL: 26 (ref 12–28)
CALCIUM SERPL-MCNC: 9.3 MG/DL (ref 8.7–10.3)
CHLORIDE SERPL-SCNC: 101 MMOL/L (ref 96–106)
CHOLEST SERPL-MCNC: 174 MG/DL (ref 100–199)
CO2 SERPL-SCNC: 25 MMOL/L (ref 20–29)
CREAT SERPL-MCNC: 0.62 MG/DL (ref 0.57–1)
EGFRCR SERPLBLD CKD-EPI 2021: 101 ML/MIN/1.73
EOSINOPHIL # BLD AUTO: 0 X10E3/UL (ref 0–0.4)
EOSINOPHIL NFR BLD AUTO: 0 %
ERYTHROCYTE [DISTWIDTH] IN BLOOD BY AUTOMATED COUNT: 13.1 % (ref 11.7–15.4)
FERRITIN SERPL-MCNC: 132 NG/ML (ref 15–150)
FOLATE SERPL-MCNC: 12.1 NG/ML
GLOBULIN SER CALC-MCNC: 2.7 G/DL (ref 1.5–4.5)
GLUCOSE SERPL-MCNC: 141 MG/DL (ref 70–99)
HBA1C MFR BLD: 6.9 % (ref 4.8–5.6)
HCT VFR BLD AUTO: 40.3 % (ref 34–46.6)
HDLC SERPL-MCNC: 50 MG/DL
HGB BLD-MCNC: 13.4 G/DL (ref 11.1–15.9)
IMM GRANULOCYTES # BLD AUTO: 0.1 X10E3/UL (ref 0–0.1)
IMM GRANULOCYTES NFR BLD AUTO: 1 %
IRON SATN MFR SERPL: 21 % (ref 15–55)
IRON SERPL-MCNC: 71 UG/DL (ref 27–139)
LDLC SERPL CALC-MCNC: 109 MG/DL (ref 0–99)
LYMPHOCYTES # BLD AUTO: 2.2 X10E3/UL (ref 0.7–3.1)
LYMPHOCYTES NFR BLD AUTO: 17 %
MCH RBC QN AUTO: 27.3 PG (ref 26.6–33)
MCHC RBC AUTO-ENTMCNC: 33.3 G/DL (ref 31.5–35.7)
MCV RBC AUTO: 82 FL (ref 79–97)
MONOCYTES # BLD AUTO: 0.9 X10E3/UL (ref 0.1–0.9)
MONOCYTES NFR BLD AUTO: 7 %
NEUTROPHILS # BLD AUTO: 10.2 X10E3/UL (ref 1.4–7)
NEUTROPHILS NFR BLD AUTO: 75 %
PLATELET # BLD AUTO: 374 X10E3/UL (ref 150–450)
POTASSIUM SERPL-SCNC: 4.8 MMOL/L (ref 3.5–5.2)
PREALB SERPL-MCNC: 19 MG/DL (ref 10–36)
PROT SERPL-MCNC: 7.1 G/DL (ref 6–8.5)
PTH-INTACT SERPL-MCNC: 23 PG/ML (ref 15–65)
RBC # BLD AUTO: 4.91 X10E6/UL (ref 3.77–5.28)
SODIUM SERPL-SCNC: 141 MMOL/L (ref 134–144)
TIBC SERPL-MCNC: 337 UG/DL (ref 250–450)
TRIGL SERPL-MCNC: 83 MG/DL (ref 0–149)
TSH SERPL DL<=0.005 MIU/L-ACNC: 3.32 UIU/ML (ref 0.45–4.5)
UIBC SERPL-MCNC: 266 UG/DL (ref 118–369)
VIT B12 SERPL-MCNC: 370 PG/ML (ref 232–1245)
VLDLC SERPL CALC-MCNC: 15 MG/DL (ref 5–40)
WBC # BLD AUTO: 13.6 X10E3/UL (ref 3.4–10.8)

## 2023-02-13 ENCOUNTER — HOSPITAL ENCOUNTER (OUTPATIENT)
Dept: SLEEP MEDICINE | Facility: HOSPITAL | Age: 63
Discharge: HOME | End: 2023-02-13
Attending: SURGERY
Payer: COMMERCIAL

## 2023-02-13 DIAGNOSIS — Z13.21 ENCOUNTER FOR VITAMIN DEFICIENCY SCREENING: ICD-10-CM

## 2023-02-13 DIAGNOSIS — Z11.8 ENCOUNTER FOR SCREENING FOR OTHER INFECTIOUS AND PARASITIC DISEASES: ICD-10-CM

## 2023-02-13 DIAGNOSIS — E55.9 VITAMIN D DEFICIENCY: ICD-10-CM

## 2023-02-13 DIAGNOSIS — E44.1 PROTEIN-CALORIE MALNUTRITION, MILD (CMS/HCC): ICD-10-CM

## 2023-02-13 DIAGNOSIS — Z13.228 ENCOUNTER FOR SCREENING FOR METABOLIC DISORDER: ICD-10-CM

## 2023-02-13 DIAGNOSIS — E66.01 MORBID OBESITY (CMS/HCC): ICD-10-CM

## 2023-02-13 DIAGNOSIS — Z13.21 ENCOUNTER FOR SCREENING FOR NUTRITIONAL DISORDER: ICD-10-CM

## 2023-02-13 LAB — UREA BREATH TEST QL: NEGATIVE

## 2023-02-13 PROCEDURE — G0399 HOME SLEEP TEST/TYPE 3 PORTA: HCPCS

## 2023-02-15 VITALS — BODY MASS INDEX: 46.65 KG/M2 | WEIGHT: 280 LBS | HEIGHT: 65 IN

## 2023-02-17 LAB — VIT B1 BLD-SCNC: 181.5 NMOL/L (ref 66.5–200)

## 2023-02-18 LAB — VIT A SERPL-MCNC: 39.2 UG/DL (ref 22–69.5)

## 2023-02-20 ENCOUNTER — DOCUMENTATION (OUTPATIENT)
Dept: BARIATRICS | Facility: CLINIC | Age: 63
End: 2023-02-20
Payer: COMMERCIAL

## 2023-02-20 DIAGNOSIS — E55.9 VITAMIN D DEFICIENCY: Primary | ICD-10-CM

## 2023-02-20 RX ORDER — ERGOCALCIFEROL 1.25 MG/1
50000 CAPSULE ORAL WEEKLY
Qty: 8 CAPSULE | Refills: 0 | Status: SHIPPED | OUTPATIENT
Start: 2023-02-20 | End: 2024-12-03 | Stop reason: ALTCHOICE

## 2023-02-27 ENCOUNTER — TRANSCRIBE ORDERS (OUTPATIENT)
Dept: SCHEDULING | Age: 63
End: 2023-02-27

## 2023-02-27 DIAGNOSIS — M89.9 DISORDER OF BONE, UNSPECIFIED: Primary | ICD-10-CM

## 2023-04-12 ENCOUNTER — OFFICE VISIT (OUTPATIENT)
Dept: PRIMARY CARE | Facility: CLINIC | Age: 63
End: 2023-04-12
Payer: COMMERCIAL

## 2023-04-12 VITALS
DIASTOLIC BLOOD PRESSURE: 84 MMHG | SYSTOLIC BLOOD PRESSURE: 138 MMHG | TEMPERATURE: 97.9 F | RESPIRATION RATE: 18 BRPM | HEART RATE: 100 BPM | OXYGEN SATURATION: 99 % | BODY MASS INDEX: 46.59 KG/M2 | HEIGHT: 65 IN

## 2023-04-12 DIAGNOSIS — Z12.31 BREAST CANCER SCREENING BY MAMMOGRAM: ICD-10-CM

## 2023-04-12 DIAGNOSIS — H10.33 ACUTE BACTERIAL CONJUNCTIVITIS OF BOTH EYES: Primary | ICD-10-CM

## 2023-04-12 PROCEDURE — 3079F DIAST BP 80-89 MM HG: CPT | Performed by: NURSE PRACTITIONER

## 2023-04-12 PROCEDURE — 3008F BODY MASS INDEX DOCD: CPT | Performed by: NURSE PRACTITIONER

## 2023-04-12 PROCEDURE — 99213 OFFICE O/P EST LOW 20 MIN: CPT | Performed by: NURSE PRACTITIONER

## 2023-04-12 PROCEDURE — 3075F SYST BP GE 130 - 139MM HG: CPT | Performed by: NURSE PRACTITIONER

## 2023-04-12 RX ORDER — MOXIFLOXACIN 5 MG/ML
1 SOLUTION/ DROPS OPHTHALMIC 3 TIMES DAILY
Qty: 3 ML | Refills: 0 | Status: SHIPPED | OUTPATIENT
Start: 2023-04-12 | End: 2023-04-19

## 2023-04-12 RX ORDER — MINOCYCLINE HYDROCHLORIDE 100 MG/1
CAPSULE ORAL
COMMUNITY
Start: 2023-03-29

## 2023-04-12 ASSESSMENT — ENCOUNTER SYMPTOMS
EYE ITCHING: 1
FEVER: 0
EYE DISCHARGE: 1
SWOLLEN GLANDS: 0
PHOTOPHOBIA: 0
SORE THROAT: 0
EYE PAIN: 1
RHINORRHEA: 0
DOUBLE VISION: 0
EYE REDNESS: 1

## 2023-04-12 ASSESSMENT — PATIENT HEALTH QUESTIONNAIRE - PHQ9: SUM OF ALL RESPONSES TO PHQ9 QUESTIONS 1 & 2: 0

## 2023-04-12 ASSESSMENT — PAIN SCALES - GENERAL: PAINLEVEL: 0-NO PAIN

## 2023-04-12 NOTE — PROGRESS NOTES
[facility] at [address]  [place] [address]  [phone]  Fax [phone]      Patient ID: Anh Rowan                              : 1960    Visit Date: 2023    Chief Complaint: Conjunctivitis         Patient ID: Anh Rowan is a 62 y.o. female.    Patient Active Problem List   Diagnosis   • Essential hypertension, benign   • Fatty liver   • Morbid obesity with body mass index (BMI) of 45.0 to 49.9 in adult (CMS/HCC)   • Marginal zone lymphoma (CMS/HCC)   • Type 2 diabetes mellitus without complication, without long-term current use of insulin (CMS/HCC)   • Venous insufficiency of both lower extremities   • Lumbar disc disorder   • Lumbar facet arthropathy   • Acute bacterial conjunctivitis of both eyes         Current Outpatient Medications:   •  blood sugar diagnostic (ACCU-CHEK GUIDE TEST STRIPS) strip, Test 1-2 times daily, Disp: 100 strip, Rfl: 1  •  blood-glucose meter (ACCU-CHEK GUIDE GLUCOSE METER) misc, Test bid, Disp: 1 each, Rfl: 0  •  celecoxib (celeBREX) 200 mg capsule, daily., Disp: , Rfl:   •  empagliflozin (JARDIANCE) 10 mg tablet, Take 1 tablet (10 mg total) by mouth daily., Disp: 90 tablet, Rfl: 1  •  lancets (ACCU-CHEK FASTCLIX LANCET DRUM) misc, Test blood sugar one to two times daily, Disp: 100 each, Rfl: 1  •  metFORMIN XR (GLUCOPHAGE-XR) 500 mg 24 hr tablet, TAKE 2 TABLETS BY MOUTH TWICE A DAY, Disp: 360 tablet, Rfl: 0  •  minocycline (MINOCIN,DYNACIN) 100 mg capsule, TAKE ONE PILL DAILY WITH A FULL GLASS OF WATER. DO NOT LIE DOWN 1 HOUR AFTER TAKING., Disp: , Rfl:   •  moxifloxacin (VIGAMOX) 0.5 % ophthalmic solution, Administer 1 drop into both eyes 3 (three) times a day for 7 days., Disp: 3 mL, Rfl: 0  •  ergocalciferol (VITAMIN D2) 50,000 unit(1250 mcg) capsule, Take 1 capsule (50,000 Units total) by mouth once a week for 8 doses., Disp: 8 capsule, Rfl: 0  •  hydrochlorothiazide (HYDRODIURIL) 25  mg tablet, TAKE 1 TABLET (25 MG TOTAL) BY MOUTH DAILY., Disp: 90 tablet, Rfl: 0    Allergies   Allergen Reactions   • Clarithromycin Rash       Social History     Tobacco Use   • Smoking status: Former   • Smokeless tobacco: Never   Substance Use Topics   • Alcohol use: Never   • Drug use: Never       Health Maintenance   Topic Date Due   • Colorectal Cancer Screening  Never done   • Cervical Cancer Screening  03/07/2023   • Influenza Vaccine (Season Ended) 10/21/2023 (Originally 8/1/2023)   • Zoster Vaccine (1 of 2) 01/13/2024 (Originally 5/23/1980)   • COVID-19 Vaccine (1) 01/13/2024 (Originally 1960)   • Breast Cancer Screening  08/09/2023   • Diabetic Foot Exam  01/05/2024   • Diabetes Kidney Health Evaluation:  uACR  01/07/2024   • Diabetes Kidney Health Evaluation: eGFR  02/11/2024   • Hemoglobin A1C  02/11/2024   • Annual Dilated Retinal Exam  02/25/2024   • Depression Screening  04/12/2024   • Hepatitis C Screening  Completed   • Meningococcal ACWY  Aged Out   • HIB Vaccines  Aged Out   • Hepatitis B Vaccines  Aged Out   • IPV Vaccines  Aged Out   • HPV Vaccines  Aged Out   • DTaP, Tdap, and Td Vaccines  Discontinued   • HIV Screening  Discontinued   • Pneumococcal  Discontinued       HPI  ? Pink eye    Conjunctivitis   The current episode started yesterday. The onset was sudden. The problem has been unchanged. The problem is moderate. Associated symptoms include eye itching, eye discharge, eye pain and eye redness. Pertinent negatives include no fever, no decreased vision, no double vision, no photophobia, no congestion, no rhinorrhea, no sore throat, no swollen glands and no URI. The eye pain is moderate. Both eyes are affected.The eye pain is not associated with movement. The eyelid exhibits no abnormality.       The following have been reviewed and updated as appropriate in this visit:   Allergies  Meds  Problems         Review of System  Review of Systems   Constitutional: Negative for fever.  "  HENT: Negative for congestion, rhinorrhea and sore throat.    Eyes: Positive for pain, discharge, redness and itching. Negative for double vision and photophobia.       Objective     Vitals  Vitals:    04/12/23 1132   BP: 138/84   BP Location: Left upper arm   Patient Position: Sitting   Pulse: 100   Resp: 18   Temp: 36.6 °C (97.9 °F)   TempSrc: Temporal   SpO2: 99%   Height: 1.651 m (5' 5\")     Body mass index is 46.59 kg/m².      Physical Exam  Vitals reviewed.   Constitutional:       General: She is not in acute distress.     Appearance: Normal appearance. She is not diaphoretic.   Eyes:      Conjunctiva/sclera:      Right eye: Right conjunctiva is not injected. No chemosis, exudate or hemorrhage.     Left eye: Left conjunctiva is injected. Chemosis and exudate present. No hemorrhage.  Cardiovascular:      Rate and Rhythm: Normal rate and regular rhythm.   Pulmonary:      Effort: Pulmonary effort is normal. No respiratory distress.   Neurological:      Mental Status: She is alert and oriented to person, place, and time.         Assessment/Plan     Problem List Items Addressed This Visit     Acute bacterial conjunctivitis of both eyes - Primary     Vigamox opthm 1 gtt TID both eyes  Good handwashing.  Caution to prevent spread of infection.  Follow up if symptoms worsen/persist.         Relevant Medications    minocycline (MINOCIN,DYNACIN) 100 mg capsule    moxifloxacin (VIGAMOX) 0.5 % ophthalmic solution   Other Visit Diagnoses     Breast cancer screening by mammogram        Relevant Orders    BI SCREENING MAMMOGRAM BILATERAL(TOMOSYNTHESIS)      Needs PAP test/GYN  Needs to schedule colonoscopy.  Pt aware and plans to schedule both.        HAYDER James  4/12/2023  "

## 2023-04-12 NOTE — LETTER
April 12, 2023     Patient: Anh Rowan  YOB: 1960  Date of Visit: 4/12/2023    To Whom it May Concern:    Anh Rowan was seen in my clinic on 4/12/2023 at 11:30 am. Please excuse Anh for her absence from work on this day to make the appointment. Anh may return to work on 04/13/2023    If you have any questions or concerns, please don't hesitate to call.         Sincerely,         HAYDER James        CC: No Recipients

## 2023-04-12 NOTE — ASSESSMENT & PLAN NOTE
Vigamox opthm 1 gtt TID both eyes  Good handwashing.  Caution to prevent spread of infection.  Follow up if symptoms worsen/persist.

## 2023-04-13 DIAGNOSIS — E11.9 TYPE 2 DIABETES MELLITUS WITHOUT COMPLICATION, WITHOUT LONG-TERM CURRENT USE OF INSULIN (CMS/HCC): ICD-10-CM

## 2023-04-13 RX ORDER — EMPAGLIFLOZIN 10 MG/1
TABLET, FILM COATED ORAL
Qty: 90 TABLET | Refills: 1 | Status: SHIPPED | OUTPATIENT
Start: 2023-04-13 | End: 2023-10-30 | Stop reason: SDUPTHER

## 2023-04-13 NOTE — TELEPHONE ENCOUNTER
Medicine Refill Request    Last Office: 4/12/2023   Last Consult Visit: Visit date not found  Last Telemedicine Visit: 12/8/2022 Meghan Cordova CRNP    Next Appointment: 7/13/2023      Current Outpatient Medications:   •  blood sugar diagnostic (ACCU-CHEK GUIDE TEST STRIPS) strip, Test 1-2 times daily, Disp: 100 strip, Rfl: 1  •  blood-glucose meter (ACCU-CHEK GUIDE GLUCOSE METER) misc, Test bid, Disp: 1 each, Rfl: 0  •  celecoxib (celeBREX) 200 mg capsule, daily., Disp: , Rfl:   •  empagliflozin (JARDIANCE) 10 mg tablet, Take 1 tablet (10 mg total) by mouth daily., Disp: 90 tablet, Rfl: 1  •  ergocalciferol (VITAMIN D2) 50,000 unit(1250 mcg) capsule, Take 1 capsule (50,000 Units total) by mouth once a week for 8 doses., Disp: 8 capsule, Rfl: 0  •  hydrochlorothiazide (HYDRODIURIL) 25 mg tablet, TAKE 1 TABLET (25 MG TOTAL) BY MOUTH DAILY., Disp: 90 tablet, Rfl: 0  •  lancets (ACCU-CHEK FASTCLIX LANCET DRUM) misc, Test blood sugar one to two times daily, Disp: 100 each, Rfl: 1  •  metFORMIN XR (GLUCOPHAGE-XR) 500 mg 24 hr tablet, TAKE 2 TABLETS BY MOUTH TWICE A DAY, Disp: 360 tablet, Rfl: 0  •  minocycline (MINOCIN,DYNACIN) 100 mg capsule, TAKE ONE PILL DAILY WITH A FULL GLASS OF WATER. DO NOT LIE DOWN 1 HOUR AFTER TAKING., Disp: , Rfl:   •  moxifloxacin (VIGAMOX) 0.5 % ophthalmic solution, Administer 1 drop into both eyes 3 (three) times a day for 7 days., Disp: 3 mL, Rfl: 0      BP Readings from Last 3 Encounters:   04/12/23 138/84   01/13/23 128/84   11/18/22 139/89       Recent Lab results:  Lab Results   Component Value Date    CHOL 174 02/11/2023   ,   Lab Results   Component Value Date    HDL 50 02/11/2023   ,   Lab Results   Component Value Date    LDLCALC 109 (H) 02/11/2023   ,   Lab Results   Component Value Date    TRIG 83 02/11/2023        Lab Results   Component Value Date    GLUCOSE 141 (H) 02/11/2023   ,   Lab Results   Component Value Date    HGBA1C 6.9 (H) 02/11/2023         Lab Results   Component  Value Date    CREATININE 0.62 02/11/2023       Lab Results   Component Value Date    TSH 3.320 02/11/2023

## 2023-06-01 ENCOUNTER — CONSULT (OUTPATIENT)
Dept: PRIMARY CARE | Facility: CLINIC | Age: 63
End: 2023-06-01
Payer: COMMERCIAL

## 2023-06-01 VITALS
HEART RATE: 100 BPM | DIASTOLIC BLOOD PRESSURE: 80 MMHG | HEIGHT: 65 IN | RESPIRATION RATE: 18 BRPM | OXYGEN SATURATION: 99 % | BODY MASS INDEX: 46.65 KG/M2 | WEIGHT: 280 LBS | TEMPERATURE: 98.3 F | SYSTOLIC BLOOD PRESSURE: 140 MMHG

## 2023-06-01 DIAGNOSIS — E66.01 MORBID OBESITY WITH BODY MASS INDEX (BMI) OF 45.0 TO 49.9 IN ADULT (CMS/HCC): ICD-10-CM

## 2023-06-01 DIAGNOSIS — I10 ESSENTIAL HYPERTENSION, BENIGN: ICD-10-CM

## 2023-06-01 DIAGNOSIS — E11.9 TYPE 2 DIABETES MELLITUS WITHOUT COMPLICATION, WITHOUT LONG-TERM CURRENT USE OF INSULIN (CMS/HCC): ICD-10-CM

## 2023-06-01 DIAGNOSIS — M17.11 PRIMARY OSTEOARTHRITIS OF RIGHT KNEE: ICD-10-CM

## 2023-06-01 DIAGNOSIS — Z01.818 PREOPERATIVE CLEARANCE: Primary | ICD-10-CM

## 2023-06-01 PROBLEM — H10.33 ACUTE BACTERIAL CONJUNCTIVITIS OF BOTH EYES: Status: RESOLVED | Noted: 2023-04-12 | Resolved: 2023-06-01

## 2023-06-01 PROBLEM — M17.0 PRIMARY OSTEOARTHRITIS OF BOTH KNEES: Status: RESOLVED | Noted: 2023-06-01 | Resolved: 2023-06-01

## 2023-06-01 PROBLEM — M17.0 PRIMARY OSTEOARTHRITIS OF BOTH KNEES: Status: ACTIVE | Noted: 2023-06-01

## 2023-06-01 PROBLEM — M17.12 PRIMARY OSTEOARTHRITIS OF LEFT KNEE: Status: ACTIVE | Noted: 2023-06-01

## 2023-06-01 PROCEDURE — 3077F SYST BP >= 140 MM HG: CPT | Performed by: NURSE PRACTITIONER

## 2023-06-01 PROCEDURE — 3008F BODY MASS INDEX DOCD: CPT | Performed by: NURSE PRACTITIONER

## 2023-06-01 PROCEDURE — 99214 OFFICE O/P EST MOD 30 MIN: CPT | Performed by: NURSE PRACTITIONER

## 2023-06-01 PROCEDURE — 3079F DIAST BP 80-89 MM HG: CPT | Performed by: NURSE PRACTITIONER

## 2023-06-01 ASSESSMENT — PAIN SCALES - GENERAL: PAINLEVEL: 0-NO PAIN

## 2023-06-01 ASSESSMENT — ENCOUNTER SYMPTOMS
GASTROINTESTINAL NEGATIVE: 1
CARDIOVASCULAR NEGATIVE: 1
RESPIRATORY NEGATIVE: 1
PSYCHIATRIC NEGATIVE: 1
CONSTITUTIONAL NEGATIVE: 1
HEMATOLOGIC/LYMPHATIC NEGATIVE: 1
EYES NEGATIVE: 1
ALLERGIC/IMMUNOLOGIC NEGATIVE: 1
ARTHRALGIAS: 1
ENDOCRINE NEGATIVE: 1

## 2023-06-01 NOTE — PROGRESS NOTES
[facility] [place]  [facility]  [address] [place] [address] [address]  [facility] [address]  [phone]  Fax [phone]      Patient ID: Anh Rowan                              : 1960    Visit Date: 2023    Chief Complaint: Pre-op Exam    Patient Active Problem List   Diagnosis   • Essential hypertension, benign   • Fatty liver   • Morbid obesity with body mass index (BMI) of 45.0 to 49.9 in adult (CMS/HCC)   • Marginal zone lymphoma (CMS/HCC)   • Type 2 diabetes mellitus without complication, without long-term current use of insulin (CMS/HCC)   • Venous insufficiency of both lower extremities   • Lumbar disc disorder   • Lumbar facet arthropathy   • Preoperative clearance   • Primary osteoarthritis of right knee         Current Outpatient Medications   Medication Sig Dispense Refill   • blood sugar diagnostic (ACCU-CHEK GUIDE TEST STRIPS) strip Test 1-2 times daily 100 strip 1   • blood-glucose meter (ACCU-CHEK GUIDE GLUCOSE METER) misc Test bid 1 each 0   • celecoxib (celeBREX) 200 mg capsule daily.     • JARDIANCE 10 mg tablet TAKE 1 TABLET BY MOUTH EVERY DAY 90 tablet 1   • lancets (ACCU-CHEK FASTCLIX LANCET DRUM) misc Test blood sugar one to two times daily 100 each 1   • metFORMIN XR (GLUCOPHAGE-XR) 500 mg 24 hr tablet TAKE 2 TABLETS BY MOUTH TWICE A  tablet 0   • minocycline (MINOCIN,DYNACIN) 100 mg capsule TAKE ONE PILL DAILY WITH A FULL GLASS OF WATER. DO NOT LIE DOWN 1 HOUR AFTER TAKING.     • ergocalciferol (VITAMIN D2) 50,000 unit(1250 mcg) capsule Take 1 capsule (50,000 Units total) by mouth once a week for 8 doses. 8 capsule 0     No current facility-administered medications for this visit.       HPI  Preoperative clearance for R TKR   Dr Saxena  Scheduled for 2023  [facility]  Anesthesia per patient--twilight and block.  Already had labs done and EKG through [facility].      Allergies   Allergen Reactions   • Clarithromycin Rash       Past Surgical History:    Procedure Laterality Date   • APPENDECTOMY     •  SECTION  00   • KNEE SURGERY     • TONSILLECTOMY  1967       Past Medical History:   Diagnosis Date   • Abnormal LFTs 2020   • Morbid obesity (CMS/HCC)    • Type 2 diabetes mellitus (CMS/HCC) 21       Health Maintenance   Topic Date Due   • Colorectal Cancer Screening  Never done   • Cervical Cancer Screening  2023   • Influenza Vaccine (Season Ended) 10/21/2023 (Originally 2023)   • Zoster Vaccine (1 of 2) 2024 (Originally 1980)   • COVID-19 Vaccine (1) 2024 (Originally 1960)   • Diabetic Foot Exam  2024   • Diabetes Kidney Health Evaluation:  uACR  2024   • Diabetes Kidney Health Evaluation: eGFR  2024   • Hemoglobin A1C  2024   • Annual Dilated Retinal Exam  2024   • Depression Screening  2024   • Breast Cancer Screening  2025   • Hepatitis C Screening  Completed   • Meningococcal ACWY  Aged Out   • HIB Vaccines  Aged Out   • Hepatitis B Vaccines  Aged Out   • IPV Vaccines  Aged Out   • HPV Vaccines  Aged Out   • DTaP, Tdap, and Td Vaccines  Discontinued   • HIV Screening  Discontinued   • Pneumococcal  Discontinued       Social History     Socioeconomic History   • Marital status:      Spouse name: None   • Number of children: None   • Years of education: None   • Highest education level: None   Tobacco Use   • Smoking status: Former   • Smokeless tobacco: Never   Substance and Sexual Activity   • Alcohol use: Never   • Drug use: Never   • Sexual activity: Not Currently     Partners: Male     Birth control/protection: Male Sterilization/Vasectomy       Family History   Problem Relation Age of Onset   • Heart disease Biological Mother    • Diabetes Biological Father        Review Of Systems  Review of Systems   Constitutional: Negative.    HENT: Negative.    Eyes: Negative.    Respiratory: Negative.    Cardiovascular: Negative.    Gastrointestinal: Negative.   "  Endocrine: Negative.    Genitourinary: Negative.    Musculoskeletal: Positive for arthralgias and gait problem.   Skin: Negative.    Allergic/Immunologic: Negative.    Hematological: Negative.    Psychiatric/Behavioral: Negative.         Vitals:    06/01/23 1510   BP: 140/80   BP Location: Left upper arm   Patient Position: Sitting   Pulse: 100   Resp: 18   Temp: 36.8 °C (98.3 °F)   TempSrc: Temporal   SpO2: 99%   Weight: 127 kg (280 lb)   Height: 1.651 m (5' 5\")       Body mass index is 46.59 kg/m².      Physical Exam  Vitals reviewed.   Constitutional:       General: She is not in acute distress.     Appearance: She is obese. She is not ill-appearing, toxic-appearing or diaphoretic.   HENT:      Head: Normocephalic.      Right Ear: Tympanic membrane, ear canal and external ear normal.      Left Ear: Tympanic membrane, ear canal and external ear normal.      Nose: Nose normal.      Mouth/Throat:      Mouth: Mucous membranes are moist.      Pharynx: Oropharynx is clear. No oropharyngeal exudate or posterior oropharyngeal erythema.   Eyes:      General:         Right eye: No discharge.         Left eye: No discharge.      Extraocular Movements: Extraocular movements intact.      Conjunctiva/sclera: Conjunctivae normal.      Pupils: Pupils are equal, round, and reactive to light.   Neck:      Vascular: No carotid bruit.   Cardiovascular:      Rate and Rhythm: Normal rate and regular rhythm.      Heart sounds: No murmur heard.     No friction rub. No gallop.   Pulmonary:      Effort: Pulmonary effort is normal.      Breath sounds: Normal breath sounds. No wheezing, rhonchi or rales.   Abdominal:      General: Bowel sounds are normal. There is no distension.      Palpations: Abdomen is soft. There is no mass.      Tenderness: There is no abdominal tenderness. There is no right CVA tenderness or left CVA tenderness.   Musculoskeletal:      Cervical back: Neck supple. No rigidity or tenderness.      Right lower leg: No " edema.      Left lower leg: No edema.   Lymphadenopathy:      Cervical: No cervical adenopathy.   Skin:     General: Skin is warm and dry.      Coloration: Skin is not pale.      Findings: No erythema or rash.   Neurological:      General: No focal deficit present.      Mental Status: She is alert and oriented to person, place, and time.      Gait: Gait normal.      Deep Tendon Reflexes: Reflexes normal.   Psychiatric:         Mood and Affect: Mood and affect normal.         Speech: Speech normal.         Behavior: Behavior normal.         Assessment/Plan     Problem List Items Addressed This Visit     Essential hypertension, benign    Current Assessment & Plan     BP top normal but pt is very anxious today.   Will continue to monitor.           Morbid obesity with body mass index (BMI) of 45.0 to 49.9 in adult (CMS/[facility])    Current Assessment & Plan     BMI 46  Weight loss recommended to lower health risks associated with obesity.         Type 2 diabetes mellitus without complication, without long-term current use of insulin (CMS/[facility])    Current Assessment & Plan      Latest Reference Range & Units 02/11/23 07:52   Hemoglobin A1C 4.8 - 5.6 % 6.9 (H)   (H): Data is abnormally high    5/25/23--fasting glucose--112  Stable DM  Ok to hold meds for 3 days prior to surgery.         Preoperative clearance - Primary    Current Assessment & Plan     Pt is medically cleared for the planned procedure.         Primary osteoarthritis of right knee    Current Assessment & Plan     Planned TKR with Dr Saxena.            Still needs to schedule colonoscopy and GYN exam.  Pt will schedule once she has recovered from her surgery.        HAYDER James  6/1/2023

## 2023-06-01 NOTE — ASSESSMENT & PLAN NOTE
Latest Reference Range & Units 02/11/23 07:52   Hemoglobin A1C 4.8 - 5.6 % 6.9 (H)   (H): Data is abnormally high    5/25/23--fasting glucose--112  Stable DM  Ok to hold meds for 3 days prior to surgery.

## 2023-08-28 ENCOUNTER — APPOINTMENT (OUTPATIENT)
Dept: URBAN - METROPOLITAN AREA CLINIC 203 | Age: 63
Setting detail: DERMATOLOGY
End: 2023-08-30

## 2023-08-28 DIAGNOSIS — C85.8 OTHER SPECIFIED TYPES OF NON-HODGKIN LYMPHOMA: ICD-10-CM

## 2023-08-28 DIAGNOSIS — L20.89 OTHER ATOPIC DERMATITIS: ICD-10-CM

## 2023-08-28 DIAGNOSIS — L71.8 OTHER ROSACEA: ICD-10-CM

## 2023-08-28 PROBLEM — C85.80 OTHER SPECIFIED TYPES OF NON-HODGKIN LYMPHOMA, UNSPECIFIED SITE: Status: ACTIVE | Noted: 2023-08-28

## 2023-08-28 PROCEDURE — OTHER PRESCRIPTION: OTHER

## 2023-08-28 PROCEDURE — 99213 OFFICE O/P EST LOW 20 MIN: CPT

## 2023-08-28 PROCEDURE — OTHER PRESCRIPTION MEDICATION MANAGEMENT: OTHER

## 2023-08-28 RX ORDER — METRONIDAZOLE 7.5 MG/G
GEL TOPICAL
Qty: 45 | Refills: 6 | Status: ERX

## 2023-08-28 RX ORDER — DOXYCYCLINE HYCLATE 20 MG/1
TABLET, FILM COATED ORAL
Qty: 60 | Refills: 3 | Status: ERX | COMMUNITY
Start: 2023-08-28

## 2023-08-28 ASSESSMENT — LOCATION SIMPLE DESCRIPTION DERM
LOCATION SIMPLE: LEFT PRETIBIAL REGION
LOCATION SIMPLE: RIGHT PRETIBIAL REGION
LOCATION SIMPLE: RIGHT CHEEK
LOCATION SIMPLE: LEFT CHEEK
LOCATION SIMPLE: RIGHT SCALP

## 2023-08-28 ASSESSMENT — LOCATION DETAILED DESCRIPTION DERM
LOCATION DETAILED: RIGHT CENTRAL MALAR CHEEK
LOCATION DETAILED: RIGHT PROXIMAL PRETIBIAL REGION
LOCATION DETAILED: LEFT CENTRAL MALAR CHEEK
LOCATION DETAILED: RIGHT CENTRAL FRONTAL SCALP
LOCATION DETAILED: LEFT DISTAL PRETIBIAL REGION

## 2023-08-28 ASSESSMENT — LOCATION ZONE DERM
LOCATION ZONE: FACE
LOCATION ZONE: LEG
LOCATION ZONE: SCALP

## 2023-08-28 NOTE — PROCEDURE: PRESCRIPTION MEDICATION MANAGEMENT
Detail Level: Zone
Initiate Treatment: doxycycline hyclate 20 mg tablet \\nQuantity: 60.0 Tablet  Days Supply: 60\\nSig: Take one capsule bid for rosacea\\n\\nmetronidazole 0.75 % topical gel \\nQuantity: 45.0 g  Days Supply: 30\\nSig: Apply BID to affected area on face.
Render In Strict Bullet Format?: No
Plan: Patients scalp is clear at todays visit
Samples Given: La roche poscarlos weinberg and neutrogena
Continue Regimen: Betamethasone

## 2023-08-30 RX ORDER — METRONIDAZOLE 7.5 MG/G
GEL TOPICAL
Qty: 45 | Refills: 6 | Status: ERX

## 2023-09-22 ENCOUNTER — RX ONLY (RX ONLY)
Age: 63
End: 2023-09-22

## 2023-09-22 RX ORDER — DOXYCYCLINE HYCLATE 20 MG/1
TABLET, FILM COATED ORAL
Qty: 90 | Refills: 3 | Status: ERX | COMMUNITY
Start: 2023-09-22

## 2023-10-04 ENCOUNTER — TELEPHONE (OUTPATIENT)
Dept: PRIMARY CARE | Facility: CLINIC | Age: 63
End: 2023-10-04
Payer: COMMERCIAL

## 2023-10-04 NOTE — TELEPHONE ENCOUNTER
Patient called [facility], patient has a bladder infection, they sent her in an antibiotic     She has not started the antitbiotic yet and now she has blood in urine    She will  the antibiotic and start that    She had pressure, pain, frequency and  Urgency    I told her it was okay

## 2023-10-04 NOTE — TELEPHONE ENCOUNTER
Patient called with complaints of blood in urine . Call transferred to [facility] for triage.

## 2023-10-11 ENCOUNTER — RX ONLY (RX ONLY)
Age: 63
End: 2023-10-11

## 2023-10-11 RX ORDER — DOXYCYCLINE HYCLATE 20 MG/1
TABLET, FILM COATED ORAL
Qty: 180 | Refills: 2 | Status: ERX | COMMUNITY
Start: 2023-10-11

## 2023-10-24 ENCOUNTER — APPOINTMENT (OUTPATIENT)
Dept: URBAN - METROPOLITAN AREA CLINIC 203 | Age: 63
Setting detail: DERMATOLOGY
End: 2023-10-30

## 2023-10-24 ENCOUNTER — RX ONLY (RX ONLY)
Age: 63
End: 2023-10-24

## 2023-10-24 DIAGNOSIS — L71.8 OTHER ROSACEA: ICD-10-CM

## 2023-10-24 PROCEDURE — 99213 OFFICE O/P EST LOW 20 MIN: CPT

## 2023-10-24 PROCEDURE — OTHER PRESCRIPTION: OTHER

## 2023-10-24 PROCEDURE — OTHER PRESCRIPTION MEDICATION MANAGEMENT: OTHER

## 2023-10-24 RX ORDER — SODIUM SULFACETAMIDE AND SULFUR 80; 40 MG/ML; MG/ML
LOTION TOPICAL
Qty: 473 | Refills: 3 | Status: ERX | COMMUNITY
Start: 2023-10-24

## 2023-10-24 RX ORDER — MINOCYCLINE HYDROCHLORIDE 100 MG/1
CAPSULE ORAL
Qty: 30 | Refills: 3 | Status: ERX | COMMUNITY
Start: 2023-10-24

## 2023-10-24 RX ORDER — AZELAIC ACID 0.15 G/G
GEL TOPICAL BID
Qty: 50 | Refills: 4 | Status: ERX

## 2023-10-24 ASSESSMENT — LOCATION ZONE DERM: LOCATION ZONE: FACE

## 2023-10-24 ASSESSMENT — LOCATION DETAILED DESCRIPTION DERM
LOCATION DETAILED: RIGHT CENTRAL MALAR CHEEK
LOCATION DETAILED: LEFT MEDIAL MALAR CHEEK

## 2023-10-24 ASSESSMENT — LOCATION SIMPLE DESCRIPTION DERM
LOCATION SIMPLE: RIGHT CHEEK
LOCATION SIMPLE: LEFT CHEEK

## 2023-10-24 NOTE — PROCEDURE: PRESCRIPTION MEDICATION MANAGEMENT
Detail Level: Zone
Initiate Treatment: Minocycline 100mg bid \\n\\nSulfaclense once daily
Render In Strict Bullet Format?: No
Continue Regimen: Azeliac acid bid

## 2023-10-26 ENCOUNTER — OFFICE VISIT (OUTPATIENT)
Dept: PRIMARY CARE | Facility: CLINIC | Age: 63
End: 2023-10-26
Payer: COMMERCIAL

## 2023-10-26 VITALS
TEMPERATURE: 98.2 F | RESPIRATION RATE: 18 BRPM | BODY MASS INDEX: 46.59 KG/M2 | HEIGHT: 65 IN | HEART RATE: 96 BPM | DIASTOLIC BLOOD PRESSURE: 80 MMHG | SYSTOLIC BLOOD PRESSURE: 140 MMHG | OXYGEN SATURATION: 99 %

## 2023-10-26 DIAGNOSIS — E11.9 TYPE 2 DIABETES MELLITUS WITHOUT COMPLICATION, WITHOUT LONG-TERM CURRENT USE OF INSULIN (CMS/HCC): ICD-10-CM

## 2023-10-26 DIAGNOSIS — J30.9 ALLERGIC SINUSITIS: Primary | ICD-10-CM

## 2023-10-26 LAB
FLUAV RNA SPEC QL NAA+PROBE: NEGATIVE
FLUBV RNA SPEC QL NAA+PROBE: NEGATIVE
RSV RNA SPEC QL NAA+PROBE: NEGATIVE
SARS-COV-2 RNA RESP QL NAA+PROBE: NEGATIVE

## 2023-10-26 PROCEDURE — 3008F BODY MASS INDEX DOCD: CPT | Performed by: NURSE PRACTITIONER

## 2023-10-26 PROCEDURE — 87637 SARSCOV2&INF A&B&RSV AMP PRB: CPT | Performed by: NURSE PRACTITIONER

## 2023-10-26 PROCEDURE — 3077F SYST BP >= 140 MM HG: CPT | Performed by: NURSE PRACTITIONER

## 2023-10-26 PROCEDURE — 3079F DIAST BP 80-89 MM HG: CPT | Performed by: NURSE PRACTITIONER

## 2023-10-26 PROCEDURE — 99214 OFFICE O/P EST MOD 30 MIN: CPT | Performed by: NURSE PRACTITIONER

## 2023-10-26 RX ORDER — METHYLPREDNISOLONE 4 MG/1
TABLET ORAL
Qty: 21 TABLET | Refills: 0 | Status: SHIPPED | OUTPATIENT
Start: 2023-10-26 | End: 2023-11-02

## 2023-10-26 ASSESSMENT — ENCOUNTER SYMPTOMS
DIAPHORESIS: 0
HEADACHES: 1
SWOLLEN GLANDS: 0
COUGH: 0
SHORTNESS OF BREATH: 0
SINUS COMPLAINT: 1
SORE THROAT: 0
SINUS PRESSURE: 1
CHILLS: 0
HOARSE VOICE: 0
NECK PAIN: 0

## 2023-10-26 ASSESSMENT — PATIENT HEALTH QUESTIONNAIRE - PHQ9: SUM OF ALL RESPONSES TO PHQ9 QUESTIONS 1 & 2: 0

## 2023-10-26 ASSESSMENT — PAIN SCALES - GENERAL: PAINLEVEL: 0-NO PAIN

## 2023-10-26 NOTE — LETTER
October 26, 2023     Patient: Anh Rowan  YOB: 1960  Date of Visit: 10/26/2023    To Whom it May Concern:    Anh Rowan was seen in my clinic on 10/26/2023 at 11:00 am. Please excuse Anh for her absence from work on this day to make the appointment.    If you have any questions or concerns, please don't hesitate to call.         Sincerely,           HAYDER James        CC: No Recipients

## 2023-10-26 NOTE — PROGRESS NOTES
[facility] at [address]  [place] [address]  [phone]  Fax [phone]      Patient ID: Anh Rowan                              : 1960    Visit Date: 10/26/2023    Chief Complaint: Nasal Congestion (Head pounding/Nose running /)         Patient ID: Anh Rowan is a 63 y.o. female.    Patient Active Problem List   Diagnosis    Essential hypertension, benign    Fatty liver    Morbid obesity with body mass index (BMI) of 45.0 to 49.9 in adult (CMS/HCC)    Marginal zone lymphoma (CMS/HCC)    Type 2 diabetes mellitus without complication, without long-term current use of insulin (CMS/HCC)    Venous insufficiency of both lower extremities    Lumbar disc disorder    Lumbar facet arthropathy    Preoperative clearance    Primary osteoarthritis of right knee    Allergic sinusitis         Current Outpatient Medications:     blood sugar diagnostic (ACCU-CHEK GUIDE TEST STRIPS) strip, Test 1-2 times daily, Disp: 100 strip, Rfl: 1    blood-glucose meter (ACCU-CHEK GUIDE GLUCOSE METER) misc, Test bid, Disp: 1 each, Rfl: 0    celecoxib (celeBREX) 200 mg capsule, daily., Disp: , Rfl:     JARDIANCE 10 mg tablet, TAKE 1 TABLET BY MOUTH EVERY DAY, Disp: 90 tablet, Rfl: 1    lancets (ACCU-CHEK FASTCLIX LANCET DRUM) misc, Test blood sugar one to two times daily, Disp: 100 each, Rfl: 1    metFORMIN XR (GLUCOPHAGE-XR) 500 mg 24 hr tablet, TAKE 2 TABLETS BY MOUTH TWICE A DAY, Disp: 360 tablet, Rfl: 1    methylPREDNISolone (MEDROL DOSEPACK) 4 mg tablet, Follow package directions., Disp: 21 tablet, Rfl: 0    minocycline (MINOCIN,DYNACIN) 100 mg capsule, TAKE ONE PILL DAILY WITH A FULL GLASS OF WATER. DO NOT LIE DOWN 1 HOUR AFTER TAKING., Disp: , Rfl:     semaglutide (OZEMPIC) 0.25 mg or 0.5 mg (2 mg/3 mL) subcutaneous injection, Inject 0.25 mg under the skin every (seven) 7 days., Disp: 3 mL, Rfl: 0    ergocalciferol (VITAMIN D2)  50,000 unit(1250 mcg) capsule, Take 1 capsule (50,000 Units total) by mouth once a week for 8 doses., Disp: 8 capsule, Rfl: 0    Allergies   Allergen Reactions    Clarithromycin Rash       Social History     Tobacco Use    Smoking status: Former    Smokeless tobacco: Never   Substance Use Topics    Alcohol use: Never    Drug use: Never       Health Maintenance   Topic Date Due    Colorectal Cancer Screening  Never done    Hepatitis B Vaccines (1 of 3 - Risk 3-dose series) Never done    Cervical Cancer Screening  03/07/2023    Influenza Vaccine (1) 08/01/2023    Zoster Vaccine (1 of 2) 01/13/2024 (Originally 5/23/1980)    COVID-19 Vaccine (1) 01/13/2024 (Originally 5/23/1965)    Diabetic Foot Exam  01/05/2024    Diabetes Kidney Health Evaluation:  uACR  01/07/2024    Diabetes Kidney Health Evaluation: eGFR  02/11/2024    Hemoglobin A1C  02/11/2024    Annual Dilated Retinal Exam  02/25/2024    Breast Cancer Screening  05/22/2024    Depression Screening  10/26/2024    Hepatitis C Screening  Completed    Meningococcal ACWY  Aged Out    HIB Vaccines  Aged Out    IPV Vaccines  Aged Out    HPV Vaccines  Aged Out    DTaP, Tdap, and Td Vaccines  Discontinued    HIV Screening  Discontinued    Pneumococcal  Discontinued       HPI  Sinus Problem  This is a new problem. The current episode started in the past 7 days. The problem is unchanged. There has been no fever. The pain is moderate. Associated symptoms include congestion, headaches and sinus pressure. Pertinent negatives include no chills, coughing, diaphoresis, ear pain, hoarse voice, neck pain, shortness of breath, sneezing, sore throat or swollen glands. Past treatments include nothing.       The following have been reviewed and updated as appropriate in this visit:          Review of System  Review of Systems   Constitutional: Negative for chills and diaphoresis.   HENT: Positive for congestion and sinus pressure. Negative for ear pain, hoarse  "voice, sneezing and sore throat.    Respiratory: Negative for cough and shortness of breath.    Musculoskeletal: Negative for neck pain.   Neurological: Positive for headaches.       Objective     Vitals  Vitals:    10/26/23 1102   BP: 140/80   BP Location: Left upper arm   Patient Position: Sitting   Pulse: 96   Resp: 18   Temp: 36.8 °C (98.2 °F)   TempSrc: Temporal   SpO2: 99%   Height: 1.651 m (5' 5\")     Body mass index is 46.59 kg/m².      Physical Exam  Vitals reviewed.   Constitutional:       General: She is not in acute distress.     Appearance: Normal appearance. She is not ill-appearing, toxic-appearing or diaphoretic.   HENT:      Right Ear: Tympanic membrane, ear canal and external ear normal.      Left Ear: Tympanic membrane, ear canal and external ear normal.      Nose: Mucosal edema and congestion present.      Right Sinus: Maxillary sinus tenderness present.      Left Sinus: Maxillary sinus tenderness present.      Mouth/Throat:      Pharynx: Posterior oropharyngeal erythema present. No pharyngeal swelling, oropharyngeal exudate or uvula swelling.   Cardiovascular:      Rate and Rhythm: Normal rate and regular rhythm.      Heart sounds: No murmur heard.     No friction rub. No gallop.   Pulmonary:      Effort: Pulmonary effort is normal. No respiratory distress.      Breath sounds: No wheezing, rhonchi or rales.   Musculoskeletal:      Cervical back: Neck supple. No rigidity or tenderness.   Lymphadenopathy:      Cervical: No cervical adenopathy.   Neurological:      Mental Status: She is alert and oriented to person, place, and time.         Assessment/Plan     Problem List Items Addressed This Visit     Type 2 diabetes mellitus without complication, without long-term current use of insulin (CMS/[facility])     Add Ozempic weekly  Labs ordered  Follow up 3 months  Monitor blood glucose regularly.         Relevant Medications    semaglutide (OZEMPIC) 0.25 mg or 0.5 mg (2 mg/3 mL) subcutaneous injection    " Other Relevant Orders    Comprehensive metabolic panel    Hemoglobin A1c    Lipid panel    Microalbumin/Creatinine Ur Random    Allergic sinusitis - Primary     COVID/flu/RSV swab collected and sent.  Quarantine for now.  Medrol pack #1  Push po fluids.         Relevant Medications    methylPREDNISolone (MEDROL DOSEPACK) 4 mg tablet    Other Relevant Orders    SARS-COV-2 (COVID-19)/ FLU A/B, AND RSV, PCR           HAYDER James  10/26/2023

## 2023-10-30 DIAGNOSIS — E11.9 TYPE 2 DIABETES MELLITUS WITHOUT COMPLICATION, WITHOUT LONG-TERM CURRENT USE OF INSULIN (CMS/HCC): ICD-10-CM

## 2023-10-30 NOTE — TELEPHONE ENCOUNTER
Medicine Refill Request    Last Office Visit: 10/26/2023   Last Consult Visit: 6/1/2023  Last Telemedicine Visit: 12/8/2022 Meghan Cordova CRNP    Next Appointment: 1/30/2024      Current Outpatient Medications:     blood sugar diagnostic (ACCU-CHEK GUIDE TEST STRIPS) strip, Test 1-2 times daily, Disp: 100 strip, Rfl: 1    blood-glucose meter (ACCU-CHEK GUIDE GLUCOSE METER) misc, Test bid, Disp: 1 each, Rfl: 0    celecoxib (celeBREX) 200 mg capsule, daily., Disp: , Rfl:     ergocalciferol (VITAMIN D2) 50,000 unit(1250 mcg) capsule, Take 1 capsule (50,000 Units total) by mouth once a week for 8 doses., Disp: 8 capsule, Rfl: 0    JARDIANCE 10 mg tablet, TAKE 1 TABLET BY MOUTH EVERY DAY, Disp: 90 tablet, Rfl: 1    lancets (ACCU-CHEK FASTCLIX LANCET DRUM) misc, Test blood sugar one to two times daily, Disp: 100 each, Rfl: 1    metFORMIN XR (GLUCOPHAGE-XR) 500 mg 24 hr tablet, TAKE 2 TABLETS BY MOUTH TWICE A DAY, Disp: 360 tablet, Rfl: 1    methylPREDNISolone (MEDROL DOSEPACK) 4 mg tablet, Follow package directions., Disp: 21 tablet, Rfl: 0    minocycline (MINOCIN,DYNACIN) 100 mg capsule, TAKE ONE PILL DAILY WITH A FULL GLASS OF WATER. DO NOT LIE DOWN 1 HOUR AFTER TAKING., Disp: , Rfl:     semaglutide (OZEMPIC) 0.25 mg or 0.5 mg (2 mg/3 mL) subcutaneous injection, Inject 0.25 mg under the skin every (seven) 7 days., Disp: 3 mL, Rfl: 0      BP Readings from Last 3 Encounters:   10/26/23 140/80   06/01/23 140/80   04/12/23 138/84       Recent Lab results:  Lab Results   Component Value Date    CHOL 174 02/11/2023   ,   Lab Results   Component Value Date    HDL 50 02/11/2023   ,   Lab Results   Component Value Date    LDLCALC 109 (H) 02/11/2023   ,   Lab Results   Component Value Date    TRIG 83 02/11/2023        Lab Results   Component Value Date    GLUCOSE 141 (H) 02/11/2023   ,   Lab Results   Component Value Date    HGBA1C 6.9 (H) 02/11/2023         Lab Results   Component Value Date    CREATININE 0.62  02/11/2023       Lab Results   Component Value Date    TSH 3.320 02/11/2023           Lab Results   Component Value Date    HGBA1C 6.9 (H) 02/11/2023

## 2023-11-05 LAB
ALBUMIN SERPL-MCNC: 4.5 G/DL (ref 3.9–4.9)
ALBUMIN/CREAT UR: <9 MG/G CREAT (ref 0–29)
ALBUMIN/GLOB SERPL: 1.8 {RATIO} (ref 1.2–2.2)
ALP SERPL-CCNC: 82 IU/L (ref 44–121)
ALT SERPL-CCNC: 41 IU/L (ref 0–32)
AST SERPL-CCNC: 42 IU/L (ref 0–40)
BILIRUB SERPL-MCNC: 0.4 MG/DL (ref 0–1.2)
BUN SERPL-MCNC: 13 MG/DL (ref 8–27)
BUN/CREAT SERPL: 20 (ref 12–28)
CALCIUM SERPL-MCNC: 10.1 MG/DL (ref 8.7–10.3)
CHLORIDE SERPL-SCNC: 99 MMOL/L (ref 96–106)
CHOLEST SERPL-MCNC: 178 MG/DL (ref 100–199)
CO2 SERPL-SCNC: 25 MMOL/L (ref 20–29)
CREAT SERPL-MCNC: 0.66 MG/DL (ref 0.57–1)
CREAT UR-MCNC: 34.8 MG/DL
EGFRCR SERPLBLD CKD-EPI 2021: 99 ML/MIN/1.73
GLOBULIN SER CALC-MCNC: 2.5 G/DL (ref 1.5–4.5)
GLUCOSE SERPL-MCNC: 137 MG/DL (ref 70–99)
HBA1C MFR BLD: 7.3 % (ref 4.8–5.6)
HDLC SERPL-MCNC: 45 MG/DL
LDLC SERPL CALC-MCNC: 103 MG/DL (ref 0–99)
MICROALBUMIN UR-MCNC: <3 UG/ML
POTASSIUM SERPL-SCNC: 5.3 MMOL/L (ref 3.5–5.2)
PROT SERPL-MCNC: 7 G/DL (ref 6–8.5)
SODIUM SERPL-SCNC: 143 MMOL/L (ref 134–144)
TRIGL SERPL-MCNC: 171 MG/DL (ref 0–149)
VLDLC SERPL CALC-MCNC: 30 MG/DL (ref 5–40)

## 2023-12-04 ENCOUNTER — TRANSCRIBE ORDERS (OUTPATIENT)
Dept: SCHEDULING | Age: 63
End: 2023-12-04

## 2023-12-04 DIAGNOSIS — M81.0 AGE-RELATED OSTEOPOROSIS WITHOUT CURRENT PATHOLOGICAL FRACTURE: ICD-10-CM

## 2023-12-04 DIAGNOSIS — I70.293 OTHER ATHEROSCLEROSIS OF NATIVE ARTERIES OF EXTREMITIES, BILATERAL LEGS (CMS/HCC): Primary | ICD-10-CM

## 2023-12-04 DIAGNOSIS — E55.9 VITAMIN D DEFICIENCY, UNSPECIFIED: Primary | ICD-10-CM

## 2023-12-15 DIAGNOSIS — E11.65 TYPE 2 DIABETES MELLITUS WITH HYPERGLYCEMIA, WITHOUT LONG-TERM CURRENT USE OF INSULIN (CMS/HCC): ICD-10-CM

## 2023-12-18 RX ORDER — METFORMIN HYDROCHLORIDE 500 MG/1
TABLET, EXTENDED RELEASE ORAL
Qty: 360 TABLET | Refills: 1 | Status: SHIPPED | OUTPATIENT
Start: 2023-12-18 | End: 2024-06-19

## 2023-12-18 NOTE — TELEPHONE ENCOUNTER
Medicine Refill Request    Last Office Visit: 10/26/2023   Last Consult Visit: 6/1/2023  Last Telemedicine Visit: 12/8/2022 Meghan Cordova CRNP    Next Appointment: 1/30/2024      Current Outpatient Medications:   •  blood sugar diagnostic (ACCU-CHEK GUIDE TEST STRIPS) strip, Test 1-2 times daily, Disp: 100 strip, Rfl: 1  •  blood-glucose meter (ACCU-CHEK GUIDE GLUCOSE METER) misc, Test bid, Disp: 1 each, Rfl: 0  •  celecoxib (celeBREX) 200 mg capsule, daily., Disp: , Rfl:   •  empagliflozin (JARDIANCE) 10 mg tablet, Take 1 tablet (10 mg total) by mouth daily., Disp: 90 tablet, Rfl: 1  •  ergocalciferol (VITAMIN D2) 50,000 unit(1250 mcg) capsule, Take 1 capsule (50,000 Units total) by mouth once a week for 8 doses., Disp: 8 capsule, Rfl: 0  •  lancets (ACCU-CHEK FASTCLIX LANCET DRUM) misc, Test blood sugar one to two times daily, Disp: 100 each, Rfl: 1  •  metFORMIN XR (GLUCOPHAGE-XR) 500 mg 24 hr tablet, TAKE 2 TABLETS BY MOUTH TWICE A DAY, Disp: 360 tablet, Rfl: 1  •  minocycline (MINOCIN,DYNACIN) 100 mg capsule, TAKE ONE PILL DAILY WITH A FULL GLASS OF WATER. DO NOT LIE DOWN 1 HOUR AFTER TAKING., Disp: , Rfl:   •  semaglutide (OZEMPIC) 0.25 mg or 0.5 mg (2 mg/3 mL) subcutaneous injection, Inject 0.25 mg under the skin every (seven) 7 days., Disp: 3 mL, Rfl: 0      BP Readings from Last 3 Encounters:   10/26/23 140/80   06/01/23 140/80   04/12/23 138/84       Recent Lab results:  Lab Results   Component Value Date    CHOL 178 11/04/2023   ,   Lab Results   Component Value Date    HDL 45 11/04/2023   ,   Lab Results   Component Value Date    LDLCALC 103 (H) 11/04/2023   ,   Lab Results   Component Value Date    TRIG 171 (H) 11/04/2023        Lab Results   Component Value Date    GLUCOSE 137 (H) 11/04/2023   ,   Lab Results   Component Value Date    HGBA1C 7.3 (H) 11/04/2023         Lab Results   Component Value Date    CREATININE 0.66 11/04/2023       Lab Results   Component Value Date    TSH 3.320 02/11/2023            Lab Results   Component Value Date    HGBA1C 7.3 (H) 11/04/2023

## 2024-01-09 ENCOUNTER — HOSPITAL ENCOUNTER (OUTPATIENT)
Dept: RADIOLOGY | Age: 64
Discharge: HOME | End: 2024-01-09
Attending: PODIATRIST
Payer: COMMERCIAL

## 2024-01-09 DIAGNOSIS — M81.0 AGE-RELATED OSTEOPOROSIS WITHOUT CURRENT PATHOLOGICAL FRACTURE: ICD-10-CM

## 2024-01-09 DIAGNOSIS — E55.9 VITAMIN D DEFICIENCY, UNSPECIFIED: ICD-10-CM

## 2024-01-09 PROCEDURE — 77080 DXA BONE DENSITY AXIAL: CPT

## 2024-01-29 ENCOUNTER — HOSPITAL ENCOUNTER (OUTPATIENT)
Dept: CARDIOLOGY | Facility: HOSPITAL | Age: 64
Discharge: HOME | End: 2024-01-29
Attending: PODIATRIST
Payer: COMMERCIAL

## 2024-01-29 DIAGNOSIS — I70.293 OTHER ATHEROSCLEROSIS OF NATIVE ARTERIES OF EXTREMITIES, BILATERAL LEGS (CMS/HCC): ICD-10-CM

## 2024-01-29 PROCEDURE — 93922 UPR/L XTREMITY ART 2 LEVELS: CPT

## 2024-01-30 LAB
LEFT ABI: 0.97
LEFT ARM BP: 175 MMHG
LEFT DORSALIS PEDIS INDEX: 0.91
LEFT DORSALIS PEDIS: 159 MMHG
LEFT POST TIBIAL INDEX: 0.97
LEFT POSTERIOR TIBIAL: 170 MMHG
RIGHT ABI: 0.94
RIGHT ARM BP: 170 MMHG
RIGHT DORSALIS PEDIS INDEX: 0.89
RIGHT DORSALIS PEDIS: 156 MMHG
RIGHT POST TIBIAL INDEX: 0.94
RIGHT POSTERIOR TIBIAL: 164 MMHG

## 2024-02-15 ENCOUNTER — OFFICE VISIT (OUTPATIENT)
Dept: PRIMARY CARE | Facility: CLINIC | Age: 64
End: 2024-02-15
Payer: COMMERCIAL

## 2024-02-15 VITALS
HEART RATE: 100 BPM | OXYGEN SATURATION: 99 % | TEMPERATURE: 98.7 F | SYSTOLIC BLOOD PRESSURE: 140 MMHG | HEIGHT: 65 IN | DIASTOLIC BLOOD PRESSURE: 82 MMHG | BODY MASS INDEX: 46.59 KG/M2 | RESPIRATION RATE: 18 BRPM

## 2024-02-15 DIAGNOSIS — E11.9 TYPE 2 DIABETES MELLITUS WITHOUT COMPLICATION, WITHOUT LONG-TERM CURRENT USE OF INSULIN (CMS/HCC): ICD-10-CM

## 2024-02-15 DIAGNOSIS — C85.80 MARGINAL ZONE LYMPHOMA (CMS/HCC): ICD-10-CM

## 2024-02-15 DIAGNOSIS — J01.00 ACUTE NON-RECURRENT MAXILLARY SINUSITIS: Primary | ICD-10-CM

## 2024-02-15 PROBLEM — J30.9 ALLERGIC SINUSITIS: Status: RESOLVED | Noted: 2023-10-26 | Resolved: 2024-02-15

## 2024-02-15 PROCEDURE — 3008F BODY MASS INDEX DOCD: CPT | Performed by: NURSE PRACTITIONER

## 2024-02-15 PROCEDURE — 3079F DIAST BP 80-89 MM HG: CPT | Performed by: NURSE PRACTITIONER

## 2024-02-15 PROCEDURE — 3077F SYST BP >= 140 MM HG: CPT | Performed by: NURSE PRACTITIONER

## 2024-02-15 PROCEDURE — 99213 OFFICE O/P EST LOW 20 MIN: CPT | Performed by: NURSE PRACTITIONER

## 2024-02-15 RX ORDER — AMOXICILLIN 875 MG/1
875 TABLET, FILM COATED ORAL 2 TIMES DAILY
Qty: 20 TABLET | Refills: 0 | Status: SHIPPED | OUTPATIENT
Start: 2024-02-15 | End: 2024-02-25

## 2024-02-15 ASSESSMENT — ENCOUNTER SYMPTOMS
SWOLLEN GLANDS: 0
NECK PAIN: 0
COUGH: 1
DIAPHORESIS: 0
SORE THROAT: 1
HOARSE VOICE: 0
HEADACHES: 1
SINUS COMPLAINT: 1
SHORTNESS OF BREATH: 0
SINUS PRESSURE: 1
CHILLS: 0

## 2024-02-15 ASSESSMENT — PAIN SCALES - GENERAL: PAINLEVEL: 0-NO PAIN

## 2024-02-15 ASSESSMENT — PATIENT HEALTH QUESTIONNAIRE - PHQ9: SUM OF ALL RESPONSES TO PHQ9 QUESTIONS 1 & 2: 0

## 2024-02-15 NOTE — PROGRESS NOTES
[facility] at [address]  [place] [address]  [phone]  Fax [phone]      Patient ID: Anh Rowan                              : 1960    Visit Date: 2/15/2024    Chief Complaint: Nasal Congestion (Sore throat /Ear ache /Took home covid test- was negative )         Patient ID: Anh Rowan is a 63 y.o. female.    Patient Active Problem List   Diagnosis   • Essential hypertension, benign   • Fatty liver   • Morbid obesity with body mass index (BMI) of 45.0 to 49.9 in adult (CMS/HCC)   • Marginal zone lymphoma (CMS/HCC)   • Type 2 diabetes mellitus without complication, without long-term current use of insulin (CMS/HCC)   • Acute non-recurrent maxillary sinusitis   • Venous insufficiency of both lower extremities   • Lumbar disc disorder   • Lumbar facet arthropathy   • Preoperative clearance   • Primary osteoarthritis of right knee         Current Outpatient Medications:   •  amoxicillin (AMOXIL) 875 mg tablet, Take 1 tablet (875 mg total) by mouth 2 (two) times a day for 10 days., Disp: 20 tablet, Rfl: 0  •  blood sugar diagnostic (ACCU-CHEK GUIDE TEST STRIPS) strip, Test 1-2 times daily, Disp: 100 strip, Rfl: 1  •  blood-glucose meter (ACCU-CHEK GUIDE GLUCOSE METER) misc, Test bid, Disp: 1 each, Rfl: 0  •  celecoxib (celeBREX) 200 mg capsule, daily., Disp: , Rfl:   •  empagliflozin (JARDIANCE) 10 mg tablet, Take 1 tablet (10 mg total) by mouth daily., Disp: 90 tablet, Rfl: 1  •  lancets (ACCU-CHEK FASTCLIX LANCET DRUM) misc, Test blood sugar one to two times daily, Disp: 100 each, Rfl: 1  •  metFORMIN XR (GLUCOPHAGE-XR) 500 mg 24 hr tablet, TAKE 2 TABLETS BY MOUTH TWICE A DAY, Disp: 360 tablet, Rfl: 1  •  minocycline (MINOCIN,DYNACIN) 100 mg capsule, TAKE ONE PILL DAILY WITH A FULL GLASS OF WATER. DO NOT LIE DOWN 1 HOUR AFTER TAKING., Disp: , Rfl:   •  semaglutide (OZEMPIC) 1 mg/dose (2 mg/1.5 mL) subcutaneous injection,  Inject 1 mg under the skin every (seven) 7 days., Disp: 3 mL, Rfl: 2  •  ergocalciferol (VITAMIN D2) 50,000 unit(1250 mcg) capsule, Take 1 capsule (50,000 Units total) by mouth once a week for 8 doses., Disp: 8 capsule, Rfl: 0    Allergies   Allergen Reactions   • Clarithromycin Rash       Social History     Tobacco Use   • Smoking status: Former   • Smokeless tobacco: Never   Substance Use Topics   • Alcohol use: Never   • Drug use: Never       Health Maintenance   Topic Date Due   • Colorectal Cancer Screening  Never done   • Zoster Vaccine (1 of 2) Never done   • RSV (60+ years old [shared decision making] or in pregnancy during 32 through 36 weeks) (1 - 1-dose 60+ series) Never done   • Cervical Cancer Screening  03/07/2023   • Influenza Vaccine (1) 02/15/2025 (Originally 8/1/2023)   • COVID-19 Vaccine (1) 02/15/2025 (Originally 5/23/1965)   • Annual Dilated Retinal Exam  02/25/2024   • Breast Cancer Screening  05/22/2024   • Depression Screening  10/26/2024   • Diabetes Kidney Health Evaluation: eGFR  11/04/2024   • Diabetes Kidney Health Evaluation:  uACR  11/04/2024   • Hemoglobin A1C  11/04/2024   • Diabetic Foot Exam  01/17/2025   • Hepatitis C Screening  Completed   • Meningococcal ACWY  Aged Out   • RSV <20 months  Aged Out   • HIB Vaccines  Aged Out   • Hepatitis B Vaccines  Aged Out   • IPV Vaccines  Aged Out   • HPV Vaccines  Aged Out   • DTaP, Tdap, and Td Vaccines  Discontinued   • HIV Screening  Discontinued   • Pneumococcal  Discontinued       HPI  Sinus symptoms for over 1 week  Mucus and congestion  Thick green  Pressure in head  Ears hurt  Sore throat    Sinus Problem  This is a new problem. The current episode started 1 to 4 weeks ago. The problem is unchanged. There has been no fever. The pain is moderate. Associated symptoms include congestion, coughing, ear pain, headaches, sinus pressure and a sore throat. Pertinent negatives include no chills, diaphoresis, hoarse voice, neck pain,  "shortness of breath, sneezing or swollen glands. Treatments tried: Mucinex. The treatment provided mild relief.       The following have been reviewed and updated as appropriate in this visit:   Allergies  Meds  Problems         Review of System  Review of Systems   Constitutional: Negative for chills and diaphoresis.   HENT: Positive for congestion, ear pain, sinus pressure and sore throat. Negative for hoarse voice and sneezing.    Respiratory: Positive for cough. Negative for shortness of breath.    Musculoskeletal: Negative for neck pain.   Neurological: Positive for headaches.       Objective     Vitals  Vitals:    02/15/24 1102   BP: 140/82   BP Location: Left upper arm   Patient Position: Sitting   Pulse: 100   Resp: 18   Temp: 37.1 °C (98.7 °F)   TempSrc: Temporal   SpO2: 99%   Height: 1.651 m (5' 5\")     Body mass index is 46.59 kg/m².      Physical Exam  Vitals reviewed.   Constitutional:       General: She is not in acute distress.     Appearance: Normal appearance. She is not ill-appearing, toxic-appearing or diaphoretic.   HENT:      Right Ear: Ear canal and external ear normal. Tympanic membrane is bulging.      Left Ear: Ear canal and external ear normal. Tympanic membrane is bulging.      Nose: Mucosal edema, congestion and rhinorrhea present.      Mouth/Throat:      Pharynx: Posterior oropharyngeal erythema present. No pharyngeal swelling, oropharyngeal exudate or uvula swelling.   Cardiovascular:      Rate and Rhythm: Normal rate and regular rhythm.      Heart sounds: No murmur heard.     No friction rub. No gallop.   Pulmonary:      Effort: Pulmonary effort is normal.      Breath sounds: Normal breath sounds. No wheezing, rhonchi or rales.   Musculoskeletal:      Cervical back: Neck supple. No rigidity or tenderness.   Lymphadenopathy:      Cervical: No cervical adenopathy.   Neurological:      Mental Status: She is alert and oriented to person, place, and time.         Assessment/Plan "     Problem List Items Addressed This Visit     Marginal zone lymphoma (CMS/HCC)     Oncology follows regularly.  Stable.         Type 2 diabetes mellitus without complication, without long-term current use of insulin (CMS/[facility])     Lost 30+ lbs so far on Ozempic  Follow up here May.         Acute non-recurrent maxillary sinusitis - Primary     Amox BID #20  Mucinex PRN  Push po fluids  Follow up if symptoms worsen/persist.           Relevant Medications    amoxicillin (AMOXIL) 875 mg tablet           HAYDER James  2/15/2024

## 2024-02-26 DIAGNOSIS — E11.9 TYPE 2 DIABETES MELLITUS WITHOUT COMPLICATION, WITHOUT LONG-TERM CURRENT USE OF INSULIN (CMS/HCC): ICD-10-CM

## 2024-02-26 NOTE — TELEPHONE ENCOUNTER
Medicine Refill Request    Last Office Visit: 2/15/2024   Last Consult Visit: 6/1/2023  Last Telemedicine Visit: 12/8/2022 Meghan Cordova CRNP    Next Appointment: 5/23/2024      Current Outpatient Medications:   •  blood sugar diagnostic (ACCU-CHEK GUIDE TEST STRIPS) strip, Test 1-2 times daily, Disp: 100 strip, Rfl: 1  •  blood-glucose meter (ACCU-CHEK GUIDE GLUCOSE METER) misc, Test bid, Disp: 1 each, Rfl: 0  •  celecoxib (celeBREX) 200 mg capsule, daily., Disp: , Rfl:   •  empagliflozin (JARDIANCE) 10 mg tablet, Take 1 tablet (10 mg total) by mouth daily., Disp: 90 tablet, Rfl: 1  •  ergocalciferol (VITAMIN D2) 50,000 unit(1250 mcg) capsule, Take 1 capsule (50,000 Units total) by mouth once a week for 8 doses., Disp: 8 capsule, Rfl: 0  •  lancets (ACCU-CHEK FASTCLIX LANCET DRUM) misc, Test blood sugar one to two times daily, Disp: 100 each, Rfl: 1  •  metFORMIN XR (GLUCOPHAGE-XR) 500 mg 24 hr tablet, TAKE 2 TABLETS BY MOUTH TWICE A DAY, Disp: 360 tablet, Rfl: 1  •  minocycline (MINOCIN,DYNACIN) 100 mg capsule, TAKE ONE PILL DAILY WITH A FULL GLASS OF WATER. DO NOT LIE DOWN 1 HOUR AFTER TAKING., Disp: , Rfl:   •  semaglutide (OZEMPIC) 1 mg/dose (2 mg/1.5 mL) subcutaneous injection, Inject 1 mg under the skin every (seven) 7 days., Disp: 3 mL, Rfl: 2      BP Readings from Last 3 Encounters:   02/15/24 140/82   10/26/23 140/80   06/01/23 140/80       Recent Lab results:  Lab Results   Component Value Date    CHOL 178 11/04/2023   ,   Lab Results   Component Value Date    HDL 45 11/04/2023   ,   Lab Results   Component Value Date    LDLCALC 103 (H) 11/04/2023   ,   Lab Results   Component Value Date    TRIG 171 (H) 11/04/2023        Lab Results   Component Value Date    GLUCOSE 137 (H) 11/04/2023   ,   Lab Results   Component Value Date    HGBA1C 7.3 (H) 11/04/2023         Lab Results   Component Value Date    CREATININE 0.66 11/04/2023       Lab Results   Component Value Date    TSH 3.320 02/11/2023           Lab  Results   Component Value Date    [facility] 7.3 (H) 11/04/2023

## 2024-03-29 ENCOUNTER — OFFICE VISIT (OUTPATIENT)
Dept: PRIMARY CARE | Facility: CLINIC | Age: 64
End: 2024-03-29
Payer: COMMERCIAL

## 2024-03-29 VITALS
BODY MASS INDEX: 46.59 KG/M2 | DIASTOLIC BLOOD PRESSURE: 84 MMHG | TEMPERATURE: 97.8 F | HEART RATE: 100 BPM | OXYGEN SATURATION: 99 % | SYSTOLIC BLOOD PRESSURE: 128 MMHG | RESPIRATION RATE: 18 BRPM | HEIGHT: 65 IN

## 2024-03-29 DIAGNOSIS — T78.40XA ALLERGIC REACTION, INITIAL ENCOUNTER: Primary | ICD-10-CM

## 2024-03-29 PROCEDURE — 3079F DIAST BP 80-89 MM HG: CPT

## 2024-03-29 PROCEDURE — 3074F SYST BP LT 130 MM HG: CPT

## 2024-03-29 PROCEDURE — 3008F BODY MASS INDEX DOCD: CPT

## 2024-03-29 PROCEDURE — 99214 OFFICE O/P EST MOD 30 MIN: CPT

## 2024-03-29 RX ORDER — CETIRIZINE HYDROCHLORIDE 10 MG/1
10 TABLET ORAL DAILY
Qty: 14 TABLET | Refills: 0 | Status: SHIPPED | OUTPATIENT
Start: 2024-03-29 | End: 2024-04-19 | Stop reason: SDUPTHER

## 2024-03-29 RX ORDER — METHYLPREDNISOLONE 4 MG/1
TABLET ORAL
Qty: 21 TABLET | Refills: 0 | Status: SHIPPED | OUTPATIENT
Start: 2024-03-29 | End: 2024-04-05

## 2024-03-29 ASSESSMENT — ENCOUNTER SYMPTOMS
COUGH: 0
SORE THROAT: 0
EYE PAIN: 0
CHEST TIGHTNESS: 0
VOICE CHANGE: 0
STRIDOR: 0
CHOKING: 0
EYE ITCHING: 0
SHORTNESS OF BREATH: 0
TROUBLE SWALLOWING: 0
FACIAL SWELLING: 1
WHEEZING: 0

## 2024-03-29 ASSESSMENT — PAIN SCALES - GENERAL: PAINLEVEL: 0-NO PAIN

## 2024-03-29 NOTE — PROGRESS NOTES
Subjective      Patient ID: Anh Rowan is a 63 y.o. female.  1960    Patient Active Problem List   Diagnosis    Essential hypertension, benign    Fatty liver    Morbid obesity with body mass index (BMI) of 45.0 to 49.9 in adult (CMS/HCC)    Marginal zone lymphoma (CMS/HCC)    Type 2 diabetes mellitus without complication, without long-term current use of insulin (CMS/HCC)    Acute non-recurrent maxillary sinusitis    Venous insufficiency of both lower extremities    Lumbar disc disorder    Lumbar facet arthropathy    Preoperative clearance    Primary osteoarthritis of right knee    Allergic reaction     Current Outpatient Medications on File Prior to Visit   Medication Sig Dispense Refill    blood sugar diagnostic (ACCU-CHEK GUIDE TEST STRIPS) strip Test 1-2 times daily 100 strip 1    blood-glucose meter (ACCU-CHEK GUIDE GLUCOSE METER) misc Test bid 1 each 0    celecoxib (celeBREX) 200 mg capsule daily.      empagliflozin (JARDIANCE) 10 mg tablet Take 1 tablet (10 mg total) by mouth daily. 90 tablet 1    lancets (ACCU-CHEK FASTCLIX LANCET DRUM) misc Test blood sugar one to two times daily 100 each 1    metFORMIN XR (GLUCOPHAGE-XR) 500 mg 24 hr tablet TAKE 2 TABLETS BY MOUTH TWICE A  tablet 1    minocycline (MINOCIN,DYNACIN) 100 mg capsule TAKE ONE PILL DAILY WITH A FULL GLASS OF WATER. DO NOT LIE DOWN 1 HOUR AFTER TAKING.      semaglutide (OZEMPIC) 1 mg/dose (2 mg/1.5 mL) subcutaneous injection Inject 1 mg under the skin every (seven) 7 days. 3 mL 2    ergocalciferol (VITAMIN D2) 50,000 unit(1250 mcg) capsule Take 1 capsule (50,000 Units total) by mouth once a week for 8 doses. 8 capsule 0     No current facility-administered medications on file prior to visit.     Allergies   Allergen Reactions    Clarithromycin Rash     HPI    Patient here with facial swelling  On March 17 woke up with my left eye swollen  Benadryl   On March 24 I woke up with a fat lip took another Benadryl   Today woke with  "my right eye and lip swollen, took another benadryl  Podiatrist gave her vitamins the week before this started   The only thing that is new is vitamins (vitamin D and vitamin B) which were given to her by her podiatrist  Vitamin D3 125 mcg IU everyday   B Complex with metabolic L-5-MTHF everyday     No throat scratchiness/tightness  No chest tightness, shortness of breath, chest pain   Had hives to right side and one behind right ear  Resolved with benadryl 25mg po     No new products- soap, shampoo, lotions, facial products, makeup, detergent, dryer sheets, perfume      The following have been reviewed and updated as appropriate in this visit:   Allergies  Meds  Problems       Review of Systems   HENT:  Positive for facial swelling. Negative for sneezing, sore throat, trouble swallowing and voice change.    Eyes:  Negative for pain and itching.   Respiratory:  Negative for cough, choking, chest tightness, shortness of breath, wheezing and stridor.    Cardiovascular:  Negative for chest pain.       Objective     Vitals:    03/29/24 1503   BP: 128/84   BP Location: Left upper arm   Patient Position: Sitting   Pulse: 100   Resp: 18   Temp: 36.6 °C (97.8 °F)   TempSrc: Temporal   SpO2: 99%   Height: 1.651 m (5' 5\")     Body mass index is 46.59 kg/m².    Physical Exam  Constitutional:       General: She is not in acute distress.     Appearance: Normal appearance.   HENT:      Right Ear: Tympanic membrane and ear canal normal.      Left Ear: Tympanic membrane and ear canal normal.      Mouth/Throat:      Mouth: Mucous membranes are moist.      Pharynx: Oropharynx is clear.   Cardiovascular:      Rate and Rhythm: Normal rate and regular rhythm.   Pulmonary:      Effort: Pulmonary effort is normal.      Breath sounds: Normal breath sounds.   Lymphadenopathy:      Cervical: No cervical adenopathy.   Skin:     General: Skin is warm and dry.   Neurological:      Mental Status: She is alert and oriented to person, place, " and time. Mental status is at baseline.         Assessment/Plan   Diagnoses and all orders for this visit:    Allergic reaction, initial encounter (Primary)  Assessment & Plan:  Facial swelling and hives in last 10 days  Resolved with benardryl  No airway involvement   Only new product is vitamins D2 and B complex given by podiatrist from his office  Will stop B complex  Will start zyrtec once daily and prn benadryl 25 mg  Medrol jose antonio sent but discussed only use if recurring symptoms despite zyrtec due to diabetes history  Given strict precautions over weekend for worsening angioedema symptoms to go to ER  Pt agreeable to plan  Follow up if symptoms persist/worsen      Orders:  -     cetirizine (ZyrTEC) 10 mg tablet; Take 1 tablet (10 mg total) by mouth daily for 14 days.  -     methylPREDNISolone (MEDROL DOSEPACK) 4 mg tablet; Follow package directions.          HAYDER Lynne  3/29/2024

## 2024-03-29 NOTE — ASSESSMENT & PLAN NOTE
Facial swelling and hives in last 10 days  Resolved with benardryl  No airway involvement   Only new product is vitamins D2 and B complex given by podiatrist from his office  Will stop B complex  Will start zyrtec once daily and prn benadryl 25 mg  Medrol jose antonio sent but discussed only use if recurring symptoms despite zyrtec due to diabetes history  Given strict precautions over weekend for worsening angioedema symptoms to go to ER  Pt agreeable to plan  Follow up if symptoms persist/worsen

## 2024-04-19 ENCOUNTER — OFFICE VISIT (OUTPATIENT)
Dept: PRIMARY CARE | Facility: CLINIC | Age: 64
End: 2024-04-19
Payer: COMMERCIAL

## 2024-04-19 VITALS
DIASTOLIC BLOOD PRESSURE: 70 MMHG | WEIGHT: 272 LBS | OXYGEN SATURATION: 99 % | RESPIRATION RATE: 18 BRPM | HEIGHT: 65 IN | SYSTOLIC BLOOD PRESSURE: 138 MMHG | TEMPERATURE: 98.4 F | HEART RATE: 98 BPM | BODY MASS INDEX: 45.32 KG/M2

## 2024-04-19 DIAGNOSIS — L50.9 URTICARIA: Primary | ICD-10-CM

## 2024-04-19 PROBLEM — T78.40XA ALLERGIC REACTION: Status: RESOLVED | Noted: 2024-03-29 | Resolved: 2024-04-19

## 2024-04-19 PROCEDURE — 3008F BODY MASS INDEX DOCD: CPT | Performed by: NURSE PRACTITIONER

## 2024-04-19 PROCEDURE — 3078F DIAST BP <80 MM HG: CPT | Performed by: NURSE PRACTITIONER

## 2024-04-19 PROCEDURE — 99213 OFFICE O/P EST LOW 20 MIN: CPT | Performed by: NURSE PRACTITIONER

## 2024-04-19 PROCEDURE — 3075F SYST BP GE 130 - 139MM HG: CPT | Performed by: NURSE PRACTITIONER

## 2024-04-19 RX ORDER — PREDNISONE 10 MG/1
TABLET ORAL
Qty: 21 TABLET | Refills: 0 | Status: SHIPPED | OUTPATIENT
Start: 2024-04-19 | End: 2024-05-07

## 2024-04-19 RX ORDER — CETIRIZINE HYDROCHLORIDE 10 MG/1
10 TABLET ORAL DAILY
Qty: 30 TABLET | Refills: 0 | Status: SHIPPED | OUTPATIENT
Start: 2024-04-19 | End: 2024-05-30

## 2024-04-19 ASSESSMENT — ENCOUNTER SYMPTOMS
SHORTNESS OF BREATH: 0
FEVER: 0
FATIGUE: 0

## 2024-04-19 ASSESSMENT — PAIN SCALES - GENERAL: PAINLEVEL: 0-NO PAIN

## 2024-04-19 NOTE — PROGRESS NOTES
[facility] at [address]  [place] [address]  [phone]  Fax [phone]      Patient ID: Anh Rowan                              : 1960    Visit Date: 2024    Chief Complaint: Hives         Patient ID: Anh Rowan is a 63 y.o. female.    Patient Active Problem List   Diagnosis    Essential hypertension, benign    Fatty liver    Morbid obesity with body mass index (BMI) of 45.0 to 49.9 in adult (CMS/HCC)    Marginal zone lymphoma (CMS/HCC)    Type 2 diabetes mellitus without complication, without long-term current use of insulin (CMS/HCC)    Acute non-recurrent maxillary sinusitis    Venous insufficiency of both lower extremities    Lumbar disc disorder    Lumbar facet arthropathy    Preoperative clearance    Primary osteoarthritis of right knee    Urticaria         Current Outpatient Medications:     blood sugar diagnostic (ACCU-CHEK GUIDE TEST STRIPS) strip, Test 1-2 times daily, Disp: 100 strip, Rfl: 1    blood-glucose meter (ACCU-CHEK GUIDE GLUCOSE METER) misc, Test bid, Disp: 1 each, Rfl: 0    celecoxib (celeBREX) 200 mg capsule, daily., Disp: , Rfl:     cetirizine (ZyrTEC) 10 mg tablet, Take 1 tablet (10 mg total) by mouth daily., Disp: 30 tablet, Rfl: 0    empagliflozin (JARDIANCE) 10 mg tablet, Take 1 tablet (10 mg total) by mouth daily., Disp: 90 tablet, Rfl: 1    ergocalciferol (VITAMIN D2) 50,000 unit(1250 mcg) capsule, Take 1 capsule (50,000 Units total) by mouth once a week for 8 doses., Disp: 8 capsule, Rfl: 0    lancets (ACCU-CHEK FASTCLIX LANCET DRUM) misc, Test blood sugar one to two times daily, Disp: 100 each, Rfl: 1    metFORMIN XR (GLUCOPHAGE-XR) 500 mg 24 hr tablet, TAKE 2 TABLETS BY MOUTH TWICE A DAY, Disp: 360 tablet, Rfl: 1    minocycline (MINOCIN,DYNACIN) 100 mg capsule, TAKE ONE PILL DAILY WITH A FULL GLASS OF WATER. DO NOT LIE DOWN 1 HOUR AFTER TAKING., Disp: , Rfl:     predniSONE (DELTASONE) 10  mg tablet, 2 po BID x 3 then 1 po BID x 3 then 1 po QD x 3. Take with food., Disp: 21 tablet, Rfl: 0    semaglutide (OZEMPIC) 1 mg/dose (2 mg/1.5 mL) subcutaneous injection, Inject 1 mg under the skin every (seven) 7 days., Disp: 3 mL, Rfl: 2    Allergies   Allergen Reactions    Clarithromycin Rash       Social History     Tobacco Use    Smoking status: Former    Smokeless tobacco: Never   Substance Use Topics    Alcohol use: Never    Drug use: Never       Health Maintenance   Topic Date Due    Colorectal Cancer Screening  Never done    Zoster Vaccine (1 of 2) Never done    RSV (60+ years old [shared decision making] or in pregnancy during 32 through 36 weeks) (1 - 1-dose 60+ series) Never done    Cervical Cancer Screening  03/07/2023    Annual Dilated Retinal Exam  02/25/2024    Influenza Vaccine (Season Ended) 02/15/2025 (Originally 8/1/2024)    COVID-19 Vaccine (1) 02/15/2025 (Originally [birth year]/1966)    Breast Cancer Screening  05/22/2024    Diabetes Kidney Health Evaluation: eGFR  11/04/2024    Diabetes Kidney Health Evaluation:  uACR  11/04/2024    Hemoglobin A1C  11/04/2024    Depression Screening  02/15/2025    Diabetic Foot Exam  04/17/2025    Hepatitis C Screening  Completed    Meningococcal ACWY  Aged Out    RSV <20 months  Aged Out    HIB Vaccines  Aged Out    Hepatitis B Vaccines  Aged Out    IPV Vaccines  Aged Out    HPV Vaccines  Aged Out    DTaP, Tdap, and Td Vaccines  Discontinued    HIV Screening  Discontinued    Pneumococcal  Discontinued       HPI  Broke out in hives again.  Occurred @ 3 weeks ago.  Treated with Zyrtec last time--they went away.  Never took steroid.  No new meds, hygiene products, foods except a new carbonated soda that she has been drinking for several weeks.    Rash  This is a recurrent problem. The current episode started yesterday. The problem is unchanged. The affected locations include the face, torso, left buttock, right buttock, left arm and right arm. The rash is  "characterized by redness, swelling and itchiness. It is unknown if there was an exposure to a precipitant. Associated symptoms include facial edema. Pertinent negatives include no fatigue, fever, joint pain or shortness of breath. Past treatments include antihistamine. The treatment provided no relief.       The following have been reviewed and updated as appropriate in this visit:          Review of System  Review of Systems   Constitutional:  Negative for fatigue and fever.   Respiratory:  Negative for shortness of breath.    Musculoskeletal:  Negative for joint pain.   Skin:  Positive for rash.       Objective     Vitals  Vitals:    04/19/24 1447   BP: 138/70   BP Location: Left upper arm   Patient Position: Sitting   Pulse: 98   Resp: 18   Temp: 36.9 °C (98.4 °F)   TempSrc: Oral   SpO2: 99%   Weight: 123 kg (272 lb)   Height: 1.651 m (5' 5\")     Body mass index is 45.26 kg/m².      Physical Exam  Vitals reviewed.   Constitutional:       General: She is not in acute distress.     Appearance: Normal appearance. She is not ill-appearing, toxic-appearing or diaphoretic.   Cardiovascular:      Rate and Rhythm: Normal rate and regular rhythm.      Heart sounds: No murmur heard.     No friction rub. No gallop.   Pulmonary:      Effort: Pulmonary effort is normal.      Breath sounds: Normal breath sounds. No wheezing, rhonchi or rales.   Skin:     Findings: Rash present.      Comments: Large welts and erythematous papules scattered on body and face.   Neurological:      Mental Status: She is alert and oriented to person, place, and time.         Assessment/Plan     Problem List Items Addressed This Visit       Urticaria - Primary     Second recurrence of hives.  Stop new carbonate drink.  Prednisone taper  Zyrtec HS x 3-4 weeks  Cool showers  ICE to itchy areas  Loose clothing.  Follow up if symptoms worsen/persist.  To ER with any breathing issues or throat closing.           Relevant Medications    predniSONE " (DELTASONE) 10 mg tablet    cetirizine (ZyrTEC) 10 mg tablet           HAYDER Jaems  4/19/2024

## 2024-04-19 NOTE — ASSESSMENT & PLAN NOTE
Second recurrence of hives.  Stop new carbonate drink.  Prednisone taper  Zyrtec HS x 3-4 weeks  Cool showers  ICE to itchy areas  Loose clothing.  Follow up if symptoms worsen/persist.  To ER with any breathing issues or throat closing.

## 2024-05-03 DIAGNOSIS — E11.9 TYPE 2 DIABETES MELLITUS WITHOUT COMPLICATION, WITHOUT LONG-TERM CURRENT USE OF INSULIN (CMS/HCC): ICD-10-CM

## 2024-05-03 RX ORDER — SEMAGLUTIDE 1.34 MG/ML
1 INJECTION, SOLUTION SUBCUTANEOUS
Qty: 3 ML | Refills: 2 | Status: SHIPPED | OUTPATIENT
Start: 2024-05-03 | End: 2024-07-22

## 2024-05-03 NOTE — TELEPHONE ENCOUNTER
Medicine Refill Request    Last Office Visit: 4/19/2024   Last Consult Visit: 6/1/2023  Last Telemedicine Visit: 12/8/2022 Meghan Cordova CRNP    Next Appointment: 5/23/2024      Current Outpatient Medications:     blood sugar diagnostic (ACCU-CHEK GUIDE TEST STRIPS) strip, Test 1-2 times daily, Disp: 100 strip, Rfl: 1    blood-glucose meter (ACCU-CHEK GUIDE GLUCOSE METER) misc, Test bid, Disp: 1 each, Rfl: 0    celecoxib (celeBREX) 200 mg capsule, daily., Disp: , Rfl:     cetirizine (ZyrTEC) 10 mg tablet, Take 1 tablet (10 mg total) by mouth daily., Disp: 30 tablet, Rfl: 0    empagliflozin (JARDIANCE) 10 mg tablet, Take 1 tablet (10 mg total) by mouth daily., Disp: 90 tablet, Rfl: 1    ergocalciferol (VITAMIN D2) 50,000 unit(1250 mcg) capsule, Take 1 capsule (50,000 Units total) by mouth once a week for 8 doses., Disp: 8 capsule, Rfl: 0    lancets (ACCU-CHEK FASTCLIX LANCET DRUM) misc, Test blood sugar one to two times daily, Disp: 100 each, Rfl: 1    metFORMIN XR (GLUCOPHAGE-XR) 500 mg 24 hr tablet, TAKE 2 TABLETS BY MOUTH TWICE A DAY, Disp: 360 tablet, Rfl: 1    minocycline (MINOCIN,DYNACIN) 100 mg capsule, TAKE ONE PILL DAILY WITH A FULL GLASS OF WATER. DO NOT LIE DOWN 1 HOUR AFTER TAKING., Disp: , Rfl:     predniSONE (DELTASONE) 10 mg tablet, 2 po BID x 3 then 1 po BID x 3 then 1 po QD x 3. Take with food., Disp: 21 tablet, Rfl: 0    semaglutide (OZEMPIC) 1 mg/dose (2 mg/1.5 mL) subcutaneous injection, Inject 1 mg under the skin every (seven) 7 days., Disp: 3 mL, Rfl: 2      BP Readings from Last 3 Encounters:   04/19/24 138/70   03/29/24 128/84   02/15/24 140/82       Recent Lab results:  Lab Results   Component Value Date    CHOL 178 11/04/2023   ,   Lab Results   Component Value Date    HDL 45 11/04/2023   ,   Lab Results   Component Value Date    LDLCALC 103 (H) 11/04/2023   ,   Lab Results   Component Value Date    TRIG 171 (H) 11/04/2023        Lab Results   Component Value Date    GLUCOSE 137 (H)  11/04/2023   ,   Lab Results   Component Value Date    HGBA1C 7.3 (H) 11/04/2023         Lab Results   Component Value Date    CREATININE 0.66 11/04/2023       Lab Results   Component Value Date    TSH 3.320 02/11/2023           Lab Results   Component Value Date    HGBA1C 7.3 (H) 11/04/2023

## 2024-05-07 DIAGNOSIS — L50.9 URTICARIA: ICD-10-CM

## 2024-05-07 RX ORDER — PREDNISONE 10 MG/1
TABLET ORAL
Qty: 21 TABLET | Refills: 0 | Status: SHIPPED | OUTPATIENT
Start: 2024-05-07 | End: 2024-05-29 | Stop reason: SDUPTHER

## 2024-05-20 ENCOUNTER — TELEPHONE (OUTPATIENT)
Dept: PRIMARY CARE | Facility: CLINIC | Age: 64
End: 2024-05-20
Payer: COMMERCIAL

## 2024-05-20 DIAGNOSIS — E11.9 TYPE 2 DIABETES MELLITUS WITHOUT COMPLICATION, WITHOUT LONG-TERM CURRENT USE OF INSULIN (CMS/HCC): Primary | ICD-10-CM

## 2024-05-20 NOTE — TELEPHONE ENCOUNTER
Patient has an appointment 5/23 but did not have her labs drawn. The patient would like to know if she should keep her appointment or reschedule. Please call the patient back.    Test Order Request   Patient PCP: Meghan Cordova CRNP  Next Office Visit: 5/23/2024  Preferred Lab: [facility]  [address] [facility] [address]  Tests Requested: blood work for upcoming appointment  , MyChart, or US Mail?: Mychart    The practice will reach out to discuss your request within 2 business days.

## 2024-05-21 NOTE — TELEPHONE ENCOUNTER
Patient called re: labs & were they reordered?      Reviewed w/ pt the labs were ordered; however, she will have them drawn Saturday.  Rescheduled the 5/23 physical to 5/30 so that labs will be available for review at appointment.

## 2024-05-26 LAB
ALBUMIN SERPL-MCNC: 4.7 G/DL (ref 3.9–4.9)
ALBUMIN/CREAT UR: 6 MG/G CREAT (ref 0–29)
ALBUMIN/GLOB SERPL: 1.9 {RATIO} (ref 1.2–2.2)
ALP SERPL-CCNC: 67 IU/L (ref 44–121)
ALT SERPL-CCNC: 44 IU/L (ref 0–32)
AST SERPL-CCNC: 43 IU/L (ref 0–40)
BASOPHILS # BLD AUTO: 0.1 X10E3/UL (ref 0–0.2)
BASOPHILS NFR BLD AUTO: 1 %
BILIRUB SERPL-MCNC: 0.3 MG/DL (ref 0–1.2)
BUN SERPL-MCNC: 8 MG/DL (ref 8–27)
BUN/CREAT SERPL: 11 (ref 12–28)
CALCIUM SERPL-MCNC: 10.2 MG/DL (ref 8.7–10.3)
CHLORIDE SERPL-SCNC: 101 MMOL/L (ref 96–106)
CHOLEST SERPL-MCNC: 191 MG/DL (ref 100–199)
CO2 SERPL-SCNC: 24 MMOL/L (ref 20–29)
CREAT SERPL-MCNC: 0.76 MG/DL (ref 0.57–1)
CREAT UR-MCNC: 113.3 MG/DL
EGFRCR SERPLBLD CKD-EPI 2021: 88 ML/MIN/1.73
EOSINOPHIL # BLD AUTO: 0.3 X10E3/UL (ref 0–0.4)
EOSINOPHIL NFR BLD AUTO: 3 %
ERYTHROCYTE [DISTWIDTH] IN BLOOD BY AUTOMATED COUNT: 13.9 % (ref 11.7–15.4)
GLOBULIN SER CALC-MCNC: 2.5 G/DL (ref 1.5–4.5)
GLUCOSE SERPL-MCNC: 130 MG/DL (ref 70–99)
HBA1C MFR BLD: 6.6 % (ref 4.8–5.6)
HCT VFR BLD AUTO: 43.8 % (ref 34–46.6)
HDLC SERPL-MCNC: 43 MG/DL
HGB BLD-MCNC: 14.3 G/DL (ref 11.1–15.9)
IMM GRANULOCYTES # BLD AUTO: 0.1 X10E3/UL (ref 0–0.1)
IMM GRANULOCYTES NFR BLD AUTO: 1 %
LDLC SERPL CALC-MCNC: 120 MG/DL (ref 0–99)
LYMPHOCYTES # BLD AUTO: 2.6 X10E3/UL (ref 0.7–3.1)
LYMPHOCYTES NFR BLD AUTO: 25 %
MCH RBC QN AUTO: 27.9 PG (ref 26.6–33)
MCHC RBC AUTO-ENTMCNC: 32.6 G/DL (ref 31.5–35.7)
MCV RBC AUTO: 85 FL (ref 79–97)
MICROALBUMIN UR-MCNC: 6.8 UG/ML
MONOCYTES # BLD AUTO: 0.7 X10E3/UL (ref 0.1–0.9)
MONOCYTES NFR BLD AUTO: 7 %
NEUTROPHILS # BLD AUTO: 6.7 X10E3/UL (ref 1.4–7)
NEUTROPHILS NFR BLD AUTO: 63 %
PLATELET # BLD AUTO: 344 X10E3/UL (ref 150–450)
POTASSIUM SERPL-SCNC: 5.7 MMOL/L (ref 3.5–5.2)
PROT SERPL-MCNC: 7.2 G/DL (ref 6–8.5)
RBC # BLD AUTO: 5.13 X10E6/UL (ref 3.77–5.28)
SODIUM SERPL-SCNC: 144 MMOL/L (ref 134–144)
TRIGL SERPL-MCNC: 155 MG/DL (ref 0–149)
VLDLC SERPL CALC-MCNC: 28 MG/DL (ref 5–40)
WBC # BLD AUTO: 10.4 X10E3/UL (ref 3.4–10.8)

## 2024-05-29 ENCOUNTER — OFFICE VISIT (OUTPATIENT)
Dept: PRIMARY CARE | Facility: CLINIC | Age: 64
End: 2024-05-29
Payer: COMMERCIAL

## 2024-05-29 VITALS
DIASTOLIC BLOOD PRESSURE: 76 MMHG | WEIGHT: 270 LBS | SYSTOLIC BLOOD PRESSURE: 132 MMHG | RESPIRATION RATE: 18 BRPM | HEART RATE: 112 BPM | BODY MASS INDEX: 44.98 KG/M2 | OXYGEN SATURATION: 99 % | HEIGHT: 65 IN

## 2024-05-29 DIAGNOSIS — E11.9 TYPE 2 DIABETES MELLITUS WITHOUT COMPLICATION, WITHOUT LONG-TERM CURRENT USE OF INSULIN (CMS/HCC): Primary | ICD-10-CM

## 2024-05-29 DIAGNOSIS — C85.80 MARGINAL ZONE LYMPHOMA (CMS/HCC): ICD-10-CM

## 2024-05-29 DIAGNOSIS — Z12.31 BREAST CANCER SCREENING BY MAMMOGRAM: ICD-10-CM

## 2024-05-29 DIAGNOSIS — I10 ESSENTIAL HYPERTENSION, BENIGN: ICD-10-CM

## 2024-05-29 DIAGNOSIS — E66.01 MORBID OBESITY WITH BODY MASS INDEX (BMI) OF 45.0 TO 49.9 IN ADULT (CMS/HCC): ICD-10-CM

## 2024-05-29 DIAGNOSIS — Z12.11 COLON CANCER SCREENING: ICD-10-CM

## 2024-05-29 DIAGNOSIS — L50.9 URTICARIA: ICD-10-CM

## 2024-05-29 PROBLEM — J01.00 ACUTE NON-RECURRENT MAXILLARY SINUSITIS: Status: RESOLVED | Noted: 2020-12-10 | Resolved: 2024-05-29

## 2024-05-29 PROBLEM — Z01.818 PREOPERATIVE CLEARANCE: Status: RESOLVED | Noted: 2023-06-01 | Resolved: 2024-05-29

## 2024-05-29 PROBLEM — M17.11 PRIMARY OSTEOARTHRITIS OF RIGHT KNEE: Status: RESOLVED | Noted: 2023-05-15 | Resolved: 2024-05-29

## 2024-05-29 PROCEDURE — 3078F DIAST BP <80 MM HG: CPT | Performed by: NURSE PRACTITIONER

## 2024-05-29 PROCEDURE — 3008F BODY MASS INDEX DOCD: CPT | Performed by: NURSE PRACTITIONER

## 2024-05-29 PROCEDURE — 99214 OFFICE O/P EST MOD 30 MIN: CPT | Performed by: NURSE PRACTITIONER

## 2024-05-29 PROCEDURE — 3075F SYST BP GE 130 - 139MM HG: CPT | Performed by: NURSE PRACTITIONER

## 2024-05-29 RX ORDER — PREDNISONE 10 MG/1
TABLET ORAL
Qty: 21 TABLET | Refills: 0 | Status: SHIPPED | OUTPATIENT
Start: 2024-05-29 | End: 2024-12-03 | Stop reason: ALTCHOICE

## 2024-05-29 ASSESSMENT — ENCOUNTER SYMPTOMS
SHORTNESS OF BREATH: 0
DIABETIC ASSOCIATED SYMPTOMS: 0
HYPERTENSION: 1
PALPITATIONS: 0

## 2024-05-29 ASSESSMENT — PATIENT HEALTH QUESTIONNAIRE - PHQ9: SUM OF ALL RESPONSES TO PHQ9 QUESTIONS 1 & 2: 0

## 2024-05-29 ASSESSMENT — PAIN SCALES - GENERAL: PAINLEVEL: 0-NO PAIN

## 2024-05-29 NOTE — PROGRESS NOTES
[facility] at [address]  [place] [address]  [phone]  Fax [phone]      Patient ID: Anh Rowan                              : 1960    Visit Date: 2024    Chief Complaint: Facial Swelling         Patient ID: Anh Rowan is a 64 y.o. female.    Patient Active Problem List   Diagnosis    Essential hypertension, benign    Fatty liver    Morbid obesity with body mass index (BMI) of 45.0 to 49.9 in adult (CMS/HCC)    Marginal zone lymphoma (CMS/HCC)    Type 2 diabetes mellitus without complication, without long-term current use of insulin (CMS/HCC)    Venous insufficiency of both lower extremities    Lumbar disc disorder    Lumbar facet arthropathy    Urticaria         Current Outpatient Medications:     blood sugar diagnostic (ACCU-CHEK GUIDE TEST STRIPS) strip, Test 1-2 times daily, Disp: 100 strip, Rfl: 1    blood-glucose meter (ACCU-CHEK GUIDE GLUCOSE METER) misc, Test bid, Disp: 1 each, Rfl: 0    celecoxib (celeBREX) 200 mg capsule, daily., Disp: , Rfl:     empagliflozin (JARDIANCE) 10 mg tablet, Take 1 tablet (10 mg total) by mouth daily., Disp: 90 tablet, Rfl: 1    lancets (ACCU-CHEK FASTCLIX LANCET DRUM) misc, Test blood sugar one to two times daily, Disp: 100 each, Rfl: 1    metFORMIN XR (GLUCOPHAGE-XR) 500 mg 24 hr tablet, TAKE 2 TABLETS BY MOUTH TWICE A DAY, Disp: 360 tablet, Rfl: 1    minocycline (MINOCIN,DYNACIN) 100 mg capsule, TAKE ONE PILL DAILY WITH A FULL GLASS OF WATER. DO NOT LIE DOWN 1 HOUR AFTER TAKING., Disp: , Rfl:     predniSONE (DELTASONE) 10 mg tablet, TAKE 2 TABS TWICE A DAY X 3 THEN 1 TWICE A DAY X 3 THEN 1 EVERY DAY X 3. TAKE WITH FOOD., Disp: 21 tablet, Rfl: 0    semaglutide (OZEMPIC) 1 mg/dose (2 mg/1.5 mL) subcutaneous injection, Inject 1 mg under the skin every (seven) 7 days., Disp: 3 mL, Rfl: 2    cetirizine (ZyrTEC) 10 mg tablet, Take 1 tablet (10 mg total) by mouth daily., Disp: 30  tablet, Rfl: 0    ergocalciferol (VITAMIN D2) 50,000 unit(1250 mcg) capsule, Take 1 capsule (50,000 Units total) by mouth once a week for 8 doses., Disp: 8 capsule, Rfl: 0    Allergies   Allergen Reactions    Clarithromycin Rash       Social History     Tobacco Use    Smoking status: Former    Smokeless tobacco: Never   Substance Use Topics    Alcohol use: Never    Drug use: Never       Health Maintenance   Topic Date Due    Colorectal Cancer Screening  Never done    Cervical Cancer Screening  03/07/2023    Annual Dilated Retinal Exam  02/25/2024    Breast Cancer Screening  05/22/2024    Influenza Vaccine (Season Ended) 02/15/2025 (Originally 8/1/2024)    COVID-19 Vaccine (1) 02/15/2025 (Originally [birth year]/23/1966)    Zoster Vaccine (1 of 2) 05/29/2025 (Originally 5/23/1980)    RSV (60+ years old [shared decision making] or in pregnancy during 32 through 36 weeks) (1 - 1-dose 60+ series) 05/29/2025 (Originally 5/23/2021)    Depression Screening  02/15/2025    Diabetic Foot Exam  04/17/2025    Diabetes Kidney Health Evaluation: eGFR  05/25/2025    Diabetes Kidney Health Evaluation:  uACR  05/25/2025    Hemoglobin A1C  05/25/2025    Hepatitis C Screening  Completed    Meningococcal ACWY  Aged Out    RSV <20 months  Aged Out    HIB Vaccines  Aged Out    Hepatitis B Vaccines  Aged Out    IPV Vaccines  Aged Out    HPV Vaccines  Aged Out    DTaP, Tdap, and Td Vaccines  Discontinued    HIV Screening  Discontinued    Pneumococcal  Discontinued       HPI  Eyelid swelling  Had a large hive on R forehead yesterday then awoke with eye swollen shut.  Took Benadryl.    Routine med check   Recent labs done.    Diabetes  She presents for her follow-up diabetic visit. She has type 2 diabetes mellitus. Onset time: years. Her disease course has been stable. There are no hypoglycemic associated symptoms. There are no diabetic associated symptoms. Pertinent negatives for diabetes include no chest pain. There are no diabetic complications.  "Current diabetic treatment includes oral agent (monotherapy) (Ozempic). She is compliant with treatment all of the time. Her weight is stable. Meal planning includes avoidance of concentrated sweets. She participates in exercise three times a week. There is no change in her home blood glucose trend. An ACE inhibitor/angiotensin II receptor blocker is not being taken. She does not see a podiatrist.Eye exam is current.   Hypertension  This is a chronic problem. The current episode started more than 1 year ago. The problem is controlled. Pertinent negatives include no chest pain, palpitations, peripheral edema or shortness of breath. There are no associated agents to hypertension. Past treatments include lifestyle changes. The current treatment provides significant improvement. There are no compliance problems.        The following have been reviewed and updated as appropriate in this visit:   Allergies  Meds  Problems         Review of System  Review of Systems   Respiratory:  Negative for shortness of breath.    Cardiovascular:  Negative for chest pain and palpitations.       Objective     Vitals  Vitals:    05/29/24 1028   BP: 132/76   Pulse: (!) 112   Resp: 18   SpO2: 99%   Weight: 122 kg (270 lb)   Height: 1.651 m (5' 5\")     Body mass index is 44.93 kg/m².      Physical Exam  Vitals reviewed.   Constitutional:       General: She is not in acute distress.     Appearance: Normal appearance. She is not ill-appearing, toxic-appearing or diaphoretic.   Eyes:      Conjunctiva/sclera:      Right eye: Right conjunctiva is not injected. No chemosis or exudate.     Left eye: Left conjunctiva is not injected. No chemosis or exudate.  Cardiovascular:      Rate and Rhythm: Normal rate and regular rhythm.      Heart sounds: No murmur heard.     No friction rub. No gallop.   Pulmonary:      Effort: Pulmonary effort is normal.      Breath sounds: Normal breath sounds. No wheezing, rhonchi or rales.   Musculoskeletal:      " Right lower leg: No edema.      Left lower leg: No edema.   Skin:     Comments: R upper eyelid with marked edema, no erythema, mild tenderness.   Neurological:      Mental Status: She is alert and oriented to person, place, and time.         Assessment/Plan     Problem List Items Addressed This Visit       Essential hypertension, benign     BP stable  Same meds  Follow up 6 months         Morbid obesity with body mass index (BMI) of 45.0 to 49.9 in adult (CMS/[facility])     BMI 44  Continue Ozempic weekly  Continue diet and weight loss efforts  Regular exercise program.         Marginal zone lymphoma (CMS/[facility])     Oncology follows  Stable.         Type 2 diabetes mellitus without complication, without long-term current use of insulin (CMS/HCC) - Primary      Latest Reference Range & Units 05/25/24 07:11   Sodium 134 - 144 mmol/L 144   Potassium, Bld 3.5 - 5.2 mmol/L 5.7 (H)   Chloride 96 - 106 mmol/L 101   CO2 20 - 29 mmol/L 24   BUN 8 - 27 mg/dL 8   Creatinine 0.57 - 1.00 mg/dL 0.76   Glucose 70 - 99 mg/dL 130 (H)   Calcium 8.7 - 10.3 mg/dL 10.2   AST (SGOT) 0 - 40 IU/L 43 (H)   ALT (SGPT) 0 - 32 IU/L 44 (H)   Alkaline Phosphatase 44 - 121 IU/L 67   Total Protein 6.0 - 8.5 g/dL 7.2   Albumin 3.9 - 4.9 g/dL 4.7   Bilirubin, Total 0.0 - 1.2 mg/dL 0.3   eGFR >59 mL/min/1.73 88   Bun/Creatinine Ratio 12 - 28  11 (L)   Globulin 1.5 - 4.5 g/dL 2.5   (H): Data is abnormally high  (L): Data is abnormally low   Latest Reference Range & Units 05/25/24 07:11   Hemoglobin A1C 4.8 - 5.6 % 6.6 (H)   (H): Data is abnormally high   Latest Reference Range & Units 05/25/24 07:11   Cholesterol 100 - 199 mg/dL 191   Triglycerides 0 - 149 mg/dL 155 (H)   HDL >39 mg/dL 43   LDL Calculated 0 - 99 mg/dL 120 (H)   (H): Data is abnormally high   Latest Reference Range & Units 05/25/24 07:11   Creatinine, Urine Not Estab. mg/dL 113.3   Microalb/Creat Ratio 0 - 29 mg/g creat 6   Microalbumin, Ur Not Estab. ug/mL 6.8     #s stable  Same meds  Repeat  labs 6 months  Watch carbohydrates in diet/ avoid concentrated sweets  Follow up 6 mo         Urticaria     Prednisone taper  Allegra daily--stay on 4 weeks  Benadryl HS  Allergy consult if needed--Dr Hamlin.         Relevant Medications    predniSONE (DELTASONE) 10 mg tablet     Other Visit Diagnoses       Breast cancer screening by mammogram        Relevant Orders    BI SCREENING MAMMOGRAM BILATERAL(TOMOSYNTHESIS)    Colon cancer screening        Relevant Orders    FIT Test                HAYDER James  5/29/2024

## 2024-05-29 NOTE — ASSESSMENT & PLAN NOTE
Latest Reference Range & Units 05/25/24 07:11   Sodium 134 - 144 mmol/L 144   Potassium, Bld 3.5 - 5.2 mmol/L 5.7 (H)   Chloride 96 - 106 mmol/L 101   CO2 20 - 29 mmol/L 24   BUN 8 - 27 mg/dL 8   Creatinine 0.57 - 1.00 mg/dL 0.76   Glucose 70 - 99 mg/dL 130 (H)   Calcium 8.7 - 10.3 mg/dL 10.2   AST (SGOT) 0 - 40 IU/L 43 (H)   ALT (SGPT) 0 - 32 IU/L 44 (H)   Alkaline Phosphatase 44 - 121 IU/L 67   Total Protein 6.0 - 8.5 g/dL 7.2   Albumin 3.9 - 4.9 g/dL 4.7   Bilirubin, Total 0.0 - 1.2 mg/dL 0.3   eGFR >59 mL/min/1.73 88   Bun/Creatinine Ratio 12 - 28  11 (L)   Globulin 1.5 - 4.5 g/dL 2.5   (H): Data is abnormally high  (L): Data is abnormally low   Latest Reference Range & Units 05/25/24 07:11   Hemoglobin A1C 4.8 - 5.6 % 6.6 (H)   (H): Data is abnormally high   Latest Reference Range & Units 05/25/24 07:11   Cholesterol 100 - 199 mg/dL 191   Triglycerides 0 - 149 mg/dL 155 (H)   HDL >39 mg/dL 43   LDL Calculated 0 - 99 mg/dL 120 (H)   (H): Data is abnormally high   Latest Reference Range & Units 05/25/24 07:11   Creatinine, Urine Not Estab. mg/dL 113.3   Microalb/Creat Ratio 0 - 29 mg/g creat 6   Microalbumin, Ur Not Estab. ug/mL 6.8       #s stable  Same meds  Repeat labs 6 months  Watch carbohydrates in diet/ avoid concentrated sweets  Follow up 6 mo

## 2024-05-29 NOTE — ASSESSMENT & PLAN NOTE
Prednisone taper  Allegra daily--stay on 4 weeks  Benadryl HS  Allergy consult if needed--Dr Hamlin.

## 2024-05-30 ENCOUNTER — DOCUMENTATION (OUTPATIENT)
Dept: PRIMARY CARE | Facility: CLINIC | Age: 64
End: 2024-05-30

## 2024-05-30 DIAGNOSIS — L50.9 URTICARIA: ICD-10-CM

## 2024-05-30 RX ORDER — CETIRIZINE HYDROCHLORIDE 10 MG/1
10 TABLET ORAL DAILY
Qty: 90 TABLET | Refills: 1 | Status: SHIPPED | OUTPATIENT
Start: 2024-05-30 | End: 2025-02-05 | Stop reason: SDUPTHER

## 2024-05-30 NOTE — PROGRESS NOTES
Quyen Richards MA has completed a chart review for Anh Rowan and have determined that the care gap has been satisfied.    Care Gap Source: [facility]    Care Gap(s) Identified: Diabetic Nephropathy Screening    Chart Review Completed: Yes            Microalbumin completed on 5/25/24.

## 2024-05-30 NOTE — TELEPHONE ENCOUNTER
Medicine Refill Request    Last Office Visit: 5/29/2024   Last Consult Visit: 6/1/2023  Last Telemedicine Visit: 12/8/2022 Meghan Cordova CRNP    Next Appointment: 12/3/2024      Current Outpatient Medications:     blood sugar diagnostic (ACCU-CHEK GUIDE TEST STRIPS) strip, Test 1-2 times daily, Disp: 100 strip, Rfl: 1    blood-glucose meter (ACCU-CHEK GUIDE GLUCOSE METER) misc, Test bid, Disp: 1 each, Rfl: 0    celecoxib (celeBREX) 200 mg capsule, daily., Disp: , Rfl:     cetirizine (ZyrTEC) 10 mg tablet, Take 1 tablet (10 mg total) by mouth daily., Disp: 30 tablet, Rfl: 0    empagliflozin (JARDIANCE) 10 mg tablet, Take 1 tablet (10 mg total) by mouth daily., Disp: 90 tablet, Rfl: 1    ergocalciferol (VITAMIN D2) 50,000 unit(1250 mcg) capsule, Take 1 capsule (50,000 Units total) by mouth once a week for 8 doses., Disp: 8 capsule, Rfl: 0    lancets (ACCU-CHEK FASTCLIX LANCET DRUM) misc, Test blood sugar one to two times daily, Disp: 100 each, Rfl: 1    metFORMIN XR (GLUCOPHAGE-XR) 500 mg 24 hr tablet, TAKE 2 TABLETS BY MOUTH TWICE A DAY, Disp: 360 tablet, Rfl: 1    minocycline (MINOCIN,DYNACIN) 100 mg capsule, TAKE ONE PILL DAILY WITH A FULL GLASS OF WATER. DO NOT LIE DOWN 1 HOUR AFTER TAKING., Disp: , Rfl:     predniSONE (DELTASONE) 10 mg tablet, TAKE 2 TABS TWICE A DAY X 3 THEN 1 TWICE A DAY X 3 THEN 1 EVERY DAY X 3. TAKE WITH FOOD., Disp: 21 tablet, Rfl: 0    semaglutide (OZEMPIC) 1 mg/dose (2 mg/1.5 mL) subcutaneous injection, Inject 1 mg under the skin every (seven) 7 days., Disp: 3 mL, Rfl: 2      BP Readings from Last 3 Encounters:   05/29/24 132/76   04/19/24 138/70   03/29/24 128/84       Recent Lab results:  Lab Results   Component Value Date    CHOL 191 05/25/2024   ,   Lab Results   Component Value Date    HDL 43 05/25/2024   ,   Lab Results   Component Value Date    LDLCALC 120 (H) 05/25/2024   ,   Lab Results   Component Value Date    TRIG 155 (H) 05/25/2024        Lab Results   Component Value Date     GLUCOSE 130 (H) 05/25/2024   ,   Lab Results   Component Value Date    HGBA1C 6.6 (H) 05/25/2024         Lab Results   Component Value Date    CREATININE 0.76 05/25/2024       Lab Results   Component Value Date    TSH 3.320 02/11/2023           Lab Results   Component Value Date    HGBA1C 6.6 (H) 05/25/2024

## 2024-06-19 DIAGNOSIS — E11.65 TYPE 2 DIABETES MELLITUS WITH HYPERGLYCEMIA, WITHOUT LONG-TERM CURRENT USE OF INSULIN (CMS/HCC): ICD-10-CM

## 2024-06-19 RX ORDER — METFORMIN HYDROCHLORIDE 500 MG/1
TABLET, EXTENDED RELEASE ORAL
Qty: 360 TABLET | Refills: 1 | Status: SHIPPED | OUTPATIENT
Start: 2024-06-19 | End: 2024-12-24

## 2024-06-19 NOTE — TELEPHONE ENCOUNTER
Medicine Refill Request    Last Office Visit: 5/29/2024   Last Consult Visit: 6/1/2023  Last Telemedicine Visit: 12/8/2022 Meghan Cordova CRNP    Next Appointment: 12/3/2024      Current Outpatient Medications:     blood sugar diagnostic (ACCU-CHEK GUIDE TEST STRIPS) strip, Test 1-2 times daily, Disp: 100 strip, Rfl: 1    blood-glucose meter (ACCU-CHEK GUIDE GLUCOSE METER) misc, Test bid, Disp: 1 each, Rfl: 0    celecoxib (celeBREX) 200 mg capsule, daily., Disp: , Rfl:     cetirizine (ZyrTEC) 10 mg tablet, TAKE 1 TABLET BY MOUTH EVERY DAY, Disp: 90 tablet, Rfl: 1    empagliflozin (JARDIANCE) 10 mg tablet, Take 1 tablet (10 mg total) by mouth daily., Disp: 90 tablet, Rfl: 1    ergocalciferol (VITAMIN D2) 50,000 unit(1250 mcg) capsule, Take 1 capsule (50,000 Units total) by mouth once a week for 8 doses., Disp: 8 capsule, Rfl: 0    lancets (ACCU-CHEK FASTCLIX LANCET DRUM) misc, Test blood sugar one to two times daily, Disp: 100 each, Rfl: 1    metFORMIN XR (GLUCOPHAGE-XR) 500 mg 24 hr tablet, TAKE 2 TABLETS BY MOUTH TWICE A DAY, Disp: 360 tablet, Rfl: 1    minocycline (MINOCIN,DYNACIN) 100 mg capsule, TAKE ONE PILL DAILY WITH A FULL GLASS OF WATER. DO NOT LIE DOWN 1 HOUR AFTER TAKING., Disp: , Rfl:     predniSONE (DELTASONE) 10 mg tablet, TAKE 2 TABS TWICE A DAY X 3 THEN 1 TWICE A DAY X 3 THEN 1 EVERY DAY X 3. TAKE WITH FOOD., Disp: 21 tablet, Rfl: 0    semaglutide (OZEMPIC) 1 mg/dose (2 mg/1.5 mL) subcutaneous injection, Inject 1 mg under the skin every (seven) 7 days., Disp: 3 mL, Rfl: 2      BP Readings from Last 3 Encounters:   05/29/24 132/76   04/19/24 138/70   03/29/24 128/84       Recent Lab results:  Lab Results   Component Value Date    CHOL 191 05/25/2024   ,   Lab Results   Component Value Date    HDL 43 05/25/2024   ,   Lab Results   Component Value Date    LDLCALC 120 (H) 05/25/2024   ,   Lab Results   Component Value Date    TRIG 155 (H) 05/25/2024        Lab Results   Component Value Date    GLUCOSE  130 (H) 05/25/2024   ,   Lab Results   Component Value Date    HGBA1C 6.6 (H) 05/25/2024         Lab Results   Component Value Date    CREATININE 0.76 05/25/2024       Lab Results   Component Value Date    TSH 3.320 02/11/2023           Lab Results   Component Value Date    HGBA1C 6.6 (H) 05/25/2024

## 2024-07-21 DIAGNOSIS — E11.9 TYPE 2 DIABETES MELLITUS WITHOUT COMPLICATION, WITHOUT LONG-TERM CURRENT USE OF INSULIN (CMS/HCC): ICD-10-CM

## 2024-07-22 RX ORDER — SEMAGLUTIDE 1.34 MG/ML
INJECTION, SOLUTION SUBCUTANEOUS
Qty: 3 ML | Refills: 2 | Status: SHIPPED | OUTPATIENT
Start: 2024-07-22 | End: 2024-11-04 | Stop reason: SDUPTHER

## 2024-07-25 ENCOUNTER — APPOINTMENT (OUTPATIENT)
Dept: URBAN - METROPOLITAN AREA CLINIC 203 | Age: 64
Setting detail: DERMATOLOGY
End: 2024-07-31

## 2024-07-25 DIAGNOSIS — L71.8 OTHER ROSACEA: ICD-10-CM

## 2024-07-25 DIAGNOSIS — L57.8 OTHER SKIN CHANGES DUE TO CHRONIC EXPOSURE TO NONIONIZING RADIATION: ICD-10-CM

## 2024-07-25 DIAGNOSIS — L20.89 OTHER ATOPIC DERMATITIS: ICD-10-CM

## 2024-07-25 DIAGNOSIS — Z71.89 OTHER SPECIFIED COUNSELING: ICD-10-CM

## 2024-07-25 PROCEDURE — OTHER PRESCRIPTION MEDICATION MANAGEMENT: OTHER

## 2024-07-25 PROCEDURE — 99214 OFFICE O/P EST MOD 30 MIN: CPT

## 2024-07-25 PROCEDURE — OTHER COUNSELING: OTHER

## 2024-07-25 PROCEDURE — OTHER SUNSCREEN RECOMMENDATIONS: OTHER

## 2024-07-25 PROCEDURE — OTHER PRESCRIPTION: OTHER

## 2024-07-25 RX ORDER — SODIUM SULFACETAMIDE AND SULFUR 80; 40 MG/ML; MG/ML
LOTION TOPICAL QD
Qty: 473 | Refills: 4 | Status: ERX | COMMUNITY
Start: 2024-07-25

## 2024-07-25 RX ORDER — SODIUM SULFACETAMIDE AND SULFUR 80; 40 MG/ML; MG/ML
LOTION TOPICAL QD
Qty: 473 | Refills: 4 | Status: ERX

## 2024-07-25 RX ORDER — DOXYCYCLINE HYCLATE 20 MG
TABLET ORAL QD
Qty: 180 | Refills: 3 | Status: ERX | COMMUNITY
Start: 2024-07-25

## 2024-07-25 RX ORDER — BETAMETHASONE DIPROPIONATE 0.5 MG/G
CREAM, AUGMENTED TOPICAL QD
Qty: 50 | Refills: 5 | Status: ERX

## 2024-07-25 ASSESSMENT — LOCATION DETAILED DESCRIPTION DERM
LOCATION DETAILED: LEFT INFERIOR FOREHEAD
LOCATION DETAILED: LEFT MEDIAL BREAST 10-11:00 REGION
LOCATION DETAILED: RIGHT CENTRAL MALAR CHEEK
LOCATION DETAILED: LEFT MEDIAL BREAST 11-12:00 REGION
LOCATION DETAILED: LEFT MEDIAL MALAR CHEEK
LOCATION DETAILED: LEFT ANTECUBITAL SKIN

## 2024-07-25 ASSESSMENT — LOCATION SIMPLE DESCRIPTION DERM
LOCATION SIMPLE: LEFT FOREHEAD
LOCATION SIMPLE: LEFT BREAST
LOCATION SIMPLE: LEFT CHEEK
LOCATION SIMPLE: LEFT UPPER ARM
LOCATION SIMPLE: RIGHT CHEEK

## 2024-07-25 ASSESSMENT — LOCATION ZONE DERM
LOCATION ZONE: TRUNK
LOCATION ZONE: FACE
LOCATION ZONE: ARM

## 2024-07-25 NOTE — PROCEDURE: PRESCRIPTION MEDICATION MANAGEMENT
Detail Level: Zone
Render In Strict Bullet Format?: No
Continue Regimen: Sulfa cleanse QD\\n\\nDoxycycline 20mg BID
Initiate Treatment: Betamethasone QD

## 2024-07-25 NOTE — PROCEDURE: SUNSCREEN RECOMMENDATIONS

## 2024-08-24 DIAGNOSIS — E11.9 TYPE 2 DIABETES MELLITUS WITHOUT COMPLICATION, WITHOUT LONG-TERM CURRENT USE OF INSULIN (CMS/HCC): ICD-10-CM

## 2024-08-26 RX ORDER — EMPAGLIFLOZIN 10 MG/1
10 TABLET, FILM COATED ORAL DAILY
Qty: 90 TABLET | Refills: 1 | Status: SHIPPED | OUTPATIENT
Start: 2024-08-26 | End: 2024-11-04 | Stop reason: SDUPTHER

## 2024-08-26 NOTE — TELEPHONE ENCOUNTER
Medicine Refill Request    Last Office Visit: 5/29/2024   Last Consult Visit: 6/1/2023  Last Telemedicine Visit: 12/8/2022 Meghan Cordova CRNP    Next Appointment: 12/3/2024      Current Outpatient Medications:     blood sugar diagnostic (ACCU-CHEK GUIDE TEST STRIPS) strip, Test 1-2 times daily, Disp: 100 strip, Rfl: 1    blood-glucose meter (ACCU-CHEK GUIDE GLUCOSE METER) misc, Test bid, Disp: 1 each, Rfl: 0    celecoxib (celeBREX) 200 mg capsule, daily., Disp: , Rfl:     cetirizine (ZyrTEC) 10 mg tablet, TAKE 1 TABLET BY MOUTH EVERY DAY, Disp: 90 tablet, Rfl: 1    empagliflozin (JARDIANCE) 10 mg tablet, Take 1 tablet (10 mg total) by mouth daily., Disp: 90 tablet, Rfl: 1    ergocalciferol (VITAMIN D2) 50,000 unit(1250 mcg) capsule, Take 1 capsule (50,000 Units total) by mouth once a week for 8 doses., Disp: 8 capsule, Rfl: 0    lancets (ACCU-CHEK FASTCLIX LANCET DRUM) misc, Test blood sugar one to two times daily, Disp: 100 each, Rfl: 1    metFORMIN XR (GLUCOPHAGE-XR) 500 mg 24 hr tablet, TAKE 2 TABLETS BY MOUTH TWICE A DAY, Disp: 360 tablet, Rfl: 1    minocycline (MINOCIN,DYNACIN) 100 mg capsule, TAKE ONE PILL DAILY WITH A FULL GLASS OF WATER. DO NOT LIE DOWN 1 HOUR AFTER TAKING., Disp: , Rfl:     predniSONE (DELTASONE) 10 mg tablet, TAKE 2 TABS TWICE A DAY X 3 THEN 1 TWICE A DAY X 3 THEN 1 EVERY DAY X 3. TAKE WITH FOOD., Disp: 21 tablet, Rfl: 0    semaglutide (OZEMPIC) 1 mg/dose (4 mg/3 mL) subcutaneous injection, INJECT 1 MG SUBCUTANEOUSLY EVERY 7 DAYS, Disp: 3 mL, Rfl: 2      BP Readings from Last 3 Encounters:   05/29/24 132/76   04/19/24 138/70   03/29/24 128/84       Recent Lab results:  Lab Results   Component Value Date    CHOL 191 05/25/2024   ,   Lab Results   Component Value Date    HDL 43 05/25/2024   ,   Lab Results   Component Value Date    LDLCALC 120 (H) 05/25/2024   ,   Lab Results   Component Value Date    TRIG 155 (H) 05/25/2024        Lab Results   Component Value Date    GLUCOSE 130 (H)  05/25/2024   ,   Lab Results   Component Value Date    HGBA1C 6.6 (H) 05/25/2024         Lab Results   Component Value Date    CREATININE 0.76 05/25/2024       Lab Results   Component Value Date    TSH 3.320 02/11/2023           Lab Results   Component Value Date    HGBA1C 6.6 (H) 05/25/2024

## 2024-11-04 DIAGNOSIS — E11.9 TYPE 2 DIABETES MELLITUS WITHOUT COMPLICATION, WITHOUT LONG-TERM CURRENT USE OF INSULIN (CMS/HCC): ICD-10-CM

## 2024-11-05 RX ORDER — SEMAGLUTIDE 1.34 MG/ML
1 INJECTION, SOLUTION SUBCUTANEOUS
Qty: 3 ML | Refills: 2 | Status: SHIPPED | OUTPATIENT
Start: 2024-11-05 | End: 2025-02-03

## 2024-11-05 NOTE — TELEPHONE ENCOUNTER
Medicine Refill Request    Last Office Visit: 5/29/2024   Last Consult Visit: 6/1/2023  Last Telemedicine Visit: 12/8/2022 Meghan Cordova CRNP    Next Appointment: 12/3/2024      Current Outpatient Medications:     blood sugar diagnostic (ACCU-CHEK GUIDE TEST STRIPS) strip, Test 1-2 times daily, Disp: 100 strip, Rfl: 1    blood-glucose meter (ACCU-CHEK GUIDE GLUCOSE METER) misc, Test bid, Disp: 1 each, Rfl: 0    celecoxib (celeBREX) 200 mg capsule, daily., Disp: , Rfl:     cetirizine (ZyrTEC) 10 mg tablet, TAKE 1 TABLET BY MOUTH EVERY DAY, Disp: 90 tablet, Rfl: 1    ergocalciferol (VITAMIN D2) 50,000 unit(1250 mcg) capsule, Take 1 capsule (50,000 Units total) by mouth once a week for 8 doses., Disp: 8 capsule, Rfl: 0    JARDIANCE 10 mg tablet, TAKE 1 TABLET BY MOUTH EVERY DAY, Disp: 90 tablet, Rfl: 1    lancets (ACCU-CHEK FASTCLIX LANCET DRUM) misc, Test blood sugar one to two times daily, Disp: 100 each, Rfl: 1    metFORMIN XR (GLUCOPHAGE-XR) 500 mg 24 hr tablet, TAKE 2 TABLETS BY MOUTH TWICE A DAY, Disp: 360 tablet, Rfl: 1    minocycline (MINOCIN,DYNACIN) 100 mg capsule, TAKE ONE PILL DAILY WITH A FULL GLASS OF WATER. DO NOT LIE DOWN 1 HOUR AFTER TAKING., Disp: , Rfl:     predniSONE (DELTASONE) 10 mg tablet, TAKE 2 TABS TWICE A DAY X 3 THEN 1 TWICE A DAY X 3 THEN 1 EVERY DAY X 3. TAKE WITH FOOD., Disp: 21 tablet, Rfl: 0    semaglutide (OZEMPIC) 1 mg/dose (4 mg/3 mL) subcutaneous injection, INJECT 1 MG SUBCUTANEOUSLY EVERY 7 DAYS, Disp: 3 mL, Rfl: 2      BP Readings from Last 3 Encounters:   05/29/24 132/76   04/19/24 138/70   03/29/24 128/84       Recent Lab results:  Lab Results   Component Value Date    CHOL 191 05/25/2024   ,   Lab Results   Component Value Date    HDL 43 05/25/2024   ,   Lab Results   Component Value Date    LDLCALC 120 (H) 05/25/2024   ,   Lab Results   Component Value Date    TRIG 155 (H) 05/25/2024        Lab Results   Component Value Date    GLUCOSE 130 (H) 05/25/2024   ,   Lab Results    Component Value Date    HGBA1C 6.6 (H) 05/25/2024         Lab Results   Component Value Date    CREATININE 0.76 05/25/2024       Lab Results   Component Value Date    TSH 3.320 02/11/2023           Lab Results   Component Value Date    HGBA1C 6.6 (H) 05/25/2024

## 2024-11-28 LAB
ALBUMIN SERPL-MCNC: 4.6 G/DL (ref 3.9–4.9)
ALP SERPL-CCNC: 76 IU/L (ref 44–121)
ALT SERPL-CCNC: 25 IU/L (ref 0–32)
AST SERPL-CCNC: 20 IU/L (ref 0–40)
BILIRUB SERPL-MCNC: 0.3 MG/DL (ref 0–1.2)
BUN SERPL-MCNC: 16 MG/DL (ref 8–27)
BUN/CREAT SERPL: 23 (ref 12–28)
CALCIUM SERPL-MCNC: 10 MG/DL (ref 8.7–10.3)
CHLORIDE SERPL-SCNC: 100 MMOL/L (ref 96–106)
CO2 SERPL-SCNC: 24 MMOL/L (ref 20–29)
CREAT SERPL-MCNC: 0.7 MG/DL (ref 0.57–1)
EGFRCR SERPLBLD CKD-EPI 2021: 97 ML/MIN/1.73
GLOBULIN SER CALC-MCNC: 2.6 G/DL (ref 1.5–4.5)
GLUCOSE SERPL-MCNC: 118 MG/DL (ref 70–99)
HBA1C MFR BLD: 6.5 % (ref 4.8–5.6)
POTASSIUM SERPL-SCNC: 5.5 MMOL/L (ref 3.5–5.2)
PROT SERPL-MCNC: 7.2 G/DL (ref 6–8.5)
SODIUM SERPL-SCNC: 141 MMOL/L (ref 134–144)

## 2024-12-03 ENCOUNTER — OFFICE VISIT (OUTPATIENT)
Dept: PRIMARY CARE | Facility: CLINIC | Age: 64
End: 2024-12-03
Payer: COMMERCIAL

## 2024-12-03 VITALS
WEIGHT: 262 LBS | OXYGEN SATURATION: 99 % | SYSTOLIC BLOOD PRESSURE: 138 MMHG | BODY MASS INDEX: 43.65 KG/M2 | TEMPERATURE: 97.2 F | HEART RATE: 100 BPM | DIASTOLIC BLOOD PRESSURE: 82 MMHG | HEIGHT: 65 IN | RESPIRATION RATE: 18 BRPM

## 2024-12-03 DIAGNOSIS — E11.9 TYPE 2 DIABETES MELLITUS WITHOUT COMPLICATION, WITHOUT LONG-TERM CURRENT USE OF INSULIN (CMS/HCC): Primary | ICD-10-CM

## 2024-12-03 DIAGNOSIS — H61.22 IMPACTED CERUMEN OF LEFT EAR: ICD-10-CM

## 2024-12-03 DIAGNOSIS — E66.01 MORBID OBESITY WITH BODY MASS INDEX (BMI) OF 45.0 TO 49.9 IN ADULT (CMS/HCC): ICD-10-CM

## 2024-12-03 DIAGNOSIS — E87.5 HYPERKALEMIA: ICD-10-CM

## 2024-12-03 DIAGNOSIS — C85.80 MARGINAL ZONE LYMPHOMA (CMS/HCC): ICD-10-CM

## 2024-12-03 DIAGNOSIS — I10 ESSENTIAL HYPERTENSION, BENIGN: ICD-10-CM

## 2024-12-03 PROBLEM — L50.9 URTICARIA: Status: RESOLVED | Noted: 2024-04-19 | Resolved: 2024-12-03

## 2024-12-03 PROCEDURE — 3079F DIAST BP 80-89 MM HG: CPT | Performed by: NURSE PRACTITIONER

## 2024-12-03 PROCEDURE — 3008F BODY MASS INDEX DOCD: CPT | Performed by: NURSE PRACTITIONER

## 2024-12-03 PROCEDURE — 99213 OFFICE O/P EST LOW 20 MIN: CPT | Performed by: NURSE PRACTITIONER

## 2024-12-03 PROCEDURE — 3075F SYST BP GE 130 - 139MM HG: CPT | Performed by: NURSE PRACTITIONER

## 2024-12-03 ASSESSMENT — PATIENT HEALTH QUESTIONNAIRE - PHQ9: SUM OF ALL RESPONSES TO PHQ9 QUESTIONS 1 & 2: 0

## 2024-12-03 ASSESSMENT — PAIN SCALES - GENERAL: PAINLEVEL_OUTOF10: 0-NO PAIN

## 2024-12-03 ASSESSMENT — ENCOUNTER SYMPTOMS
RESPIRATORY NEGATIVE: 1
CARDIOVASCULAR NEGATIVE: 1
DIABETIC ASSOCIATED SYMPTOMS: 0
CONSTITUTIONAL NEGATIVE: 1

## 2024-12-03 NOTE — ASSESSMENT & PLAN NOTE
Left ear flushed with warm water lavage until clear.  TM intact and clear  Canal with mild erythema, no edema or exudate.  Follow up as needed.

## 2024-12-03 NOTE — PROGRESS NOTES
[facility]  [address]  [place] [address]  [phone]  Fax [phone]      Patient ID: Anh Rowan                              : 1960    Visit Date: 12/3/2024    Chief Complaint: Follow-up (Routine 6m visit )         Patient ID: Anh Rowan is a 64 y.o. female.    Patient Active Problem List   Diagnosis    Essential hypertension, benign    Fatty liver    Morbid obesity with body mass index (BMI) of 45.0 to 49.9 in adult (CMS/HCC)    Marginal zone lymphoma (CMS/HCC)    Type 2 diabetes mellitus without complication, without long-term current use of insulin (CMS/HCC)    Venous insufficiency of both lower extremities    Lumbar disc disorder    Lumbar facet arthropathy    Hyperkalemia    Impacted cerumen of left ear         Current Outpatient Medications:     blood sugar diagnostic (ACCU-CHEK GUIDE TEST STRIPS) strip, Test 1-2 times daily, Disp: 100 strip, Rfl: 1    blood-glucose meter (ACCU-CHEK GUIDE GLUCOSE METER) misc, Test bid, Disp: 1 each, Rfl: 0    cetirizine (ZyrTEC) 10 mg tablet, TAKE 1 TABLET BY MOUTH EVERY DAY, Disp: 90 tablet, Rfl: 1    empagliflozin (JARDIANCE) 10 mg tablet, Take 1 tablet (10 mg total) by mouth daily., Disp: 90 tablet, Rfl: 1    lancets (ACCU-CHEK FASTCLIX LANCET DRUM) misc, Test blood sugar one to two times daily, Disp: 100 each, Rfl: 1    metFORMIN XR (GLUCOPHAGE-XR) 500 mg 24 hr tablet, TAKE 2 TABLETS BY MOUTH TWICE A DAY, Disp: 360 tablet, Rfl: 1    minocycline (MINOCIN,DYNACIN) 100 mg capsule, TAKE ONE PILL DAILY WITH A FULL GLASS OF WATER. DO NOT LIE DOWN 1 HOUR AFTER TAKING., Disp: , Rfl:     semaglutide (OZEMPIC) 1 mg/dose (4 mg/3 mL) subcutaneous injection, Inject 1 mg under the skin every (seven) 7 days., Disp: 3 mL, Rfl: 2    Allergies   Allergen Reactions    Clarithromycin Rash       Social History     Tobacco Use    Smoking status: Former    Smokeless tobacco: Never   Substance Use  Topics    Alcohol use: Never    Drug use: Never       Health Maintenance   Topic Date Due    Colorectal Cancer Screening  Never done    Cervical Cancer Screening  03/07/2023    Annual Dilated Retinal Exam  02/25/2024    COVID-19 Vaccine (1) 02/15/2025 (Originally 5/23/1965)    Zoster Vaccine (1 of 2) 05/29/2025 (Originally 5/23/1980)    RSV Vaccine (1 - Risk 60-74 years 1-dose series) 05/29/2025 (Originally 5/23/2021)    Influenza Vaccine (1) 12/03/2025 (Originally 8/1/2024)    Diabetic Foot Exam  04/17/2025    Diabetes Kidney Health Evaluation:  uACR  05/25/2025    Breast Cancer Screening  07/05/2025    Diabetes Kidney Health Evaluation: eGFR  11/27/2025    Hemoglobin A1C  11/27/2025    Depression Screening  12/03/2025    Hepatitis C Screening  Completed    Meningococcal ACWY  Aged Out    RSV <20 months  Aged Out    HIB Vaccines  Aged Out    Hepatitis B Vaccines  Aged Out    IPV Vaccines  Aged Out    HPV Vaccines  Aged Out    DTaP, Tdap, and Td Vaccines  Discontinued    HIV Screening  Discontinued    Pneumococcal  Discontinued       HPI  Routine follow up  Recent labs  K+ was elevated  Does eat a lot of bananas and oranges every day  Also salads every day.    Diabetes  She presents for her follow-up diabetic visit. She has type 2 diabetes mellitus. Her disease course has been stable. There are no diabetic associated symptoms. There are no diabetic complications. Risk factors for coronary artery disease include obesity. Current diabetic treatment includes oral agent (dual therapy) (Ozempic). She is compliant with treatment all of the time. She is following a generally healthy diet. Meal planning includes avoidance of concentrated sweets. She participates in exercise intermittently. An ACE inhibitor/angiotensin II receptor blocker is not being taken. She sees a podiatrist.Eye exam is current.       The following have been reviewed and updated as appropriate in this visit:   Allergies  Meds  Problems         Review  "of System  Review of Systems   Constitutional: Negative.    Respiratory: Negative.     Cardiovascular: Negative.        Objective     Vitals  Vitals:    12/03/24 1543   BP: 138/82   BP Location: Left upper arm   Patient Position: Sitting   Pulse: 100   Resp: 18   Temp: 36.2 °C (97.2 °F)   TempSrc: Temporal   SpO2: 99%   Weight: 119 kg (262 lb)   Height: 1.651 m (5' 5\")     Body mass index is 43.6 kg/m².      Physical Exam  Vitals reviewed.   Constitutional:       General: She is not in acute distress.     Appearance: Normal appearance. She is not ill-appearing, toxic-appearing or diaphoretic.   HENT:      Right Ear: Tympanic membrane, ear canal and external ear normal.      Left Ear: External ear normal. There is impacted cerumen.   Cardiovascular:      Rate and Rhythm: Normal rate and regular rhythm.      Heart sounds: No murmur heard.     No friction rub. No gallop.   Pulmonary:      Effort: Pulmonary effort is normal.      Breath sounds: Normal breath sounds. No wheezing, rhonchi or rales.   Musculoskeletal:      Right lower leg: No edema.      Left lower leg: No edema.   Neurological:      Mental Status: She is alert and oriented to person, place, and time.         Assessment/Plan     Problem List Items Addressed This Visit       Essential hypertension, benign     BP stable.  Off meds.  Monitor         Morbid obesity with body mass index (BMI) of 45.0 to 49.9 in adult (CMS/[facility])     BMI 43  Encouraged weight loss and regula exercise.         Marginal zone lymphoma (CMS/[facility])     Stable.         Type 2 diabetes mellitus without complication, without long-term current use of insulin (CMS/[facility]) - Primary      Latest Reference Range & Units 11/27/24 08:19   Sodium 134 - 144 mmol/L 141   Potassium, Bld 3.5 - 5.2 mmol/L 5.5 (H)   Chloride 96 - 106 mmol/L 100   CO2 20 - 29 mmol/L 24   BUN 8 - 27 mg/dL 16   Creatinine 0.57 - 1.00 mg/dL 0.70   Glucose 70 - 99 mg/dL 118 (H)   Calcium 8.7 - 10.3 mg/dL 10.0   AST (SGOT) 0 - 40 " IU/L 20   ALT (SGPT) 0 - 32 IU/L 25   Alkaline Phosphatase 44 - 121 IU/L 76   Total Protein 6.0 - 8.5 g/dL 7.2   Albumin 3.9 - 4.9 g/dL 4.6   Bilirubin, Total 0.0 - 1.2 mg/dL 0.3   eGFR >59 mL/min/1.73 97   Bun/Creatinine Ratio 12 - 28  23   Globulin 1.5 - 4.5 g/dL 2.6   (H): Data is abnormally high   Latest Reference Range & Units 11/27/24 08:19   Hemoglobin A1C 4.8 - 5.6 % 6.5 (H)   (H): Data is abnormally high    Stable on current meds.  No changes  Follow up 6 months         Hyperkalemia     Persistent  May be diet related  Avoid all K+ rich foods for 1 month  Repeat BMP in 1 month         Relevant Orders    Basic metabolic panel    Impacted cerumen of left ear     Left ear flushed with warm water lavage until clear.  TM intact and clear  Canal with mild erythema, no edema or exudate.  Follow up as needed.                HAYDER James  12/3/2024

## 2024-12-03 NOTE — ASSESSMENT & PLAN NOTE
Latest Reference Range & Units 11/27/24 08:19   Sodium 134 - 144 mmol/L 141   Potassium, Bld 3.5 - 5.2 mmol/L 5.5 (H)   Chloride 96 - 106 mmol/L 100   CO2 20 - 29 mmol/L 24   BUN 8 - 27 mg/dL 16   Creatinine 0.57 - 1.00 mg/dL 0.70   Glucose 70 - 99 mg/dL 118 (H)   Calcium 8.7 - 10.3 mg/dL 10.0   AST (SGOT) 0 - 40 IU/L 20   ALT (SGPT) 0 - 32 IU/L 25   Alkaline Phosphatase 44 - 121 IU/L 76   Total Protein 6.0 - 8.5 g/dL 7.2   Albumin 3.9 - 4.9 g/dL 4.6   Bilirubin, Total 0.0 - 1.2 mg/dL 0.3   eGFR >59 mL/min/1.73 97   Bun/Creatinine Ratio 12 - 28  23   Globulin 1.5 - 4.5 g/dL 2.6   (H): Data is abnormally high   Latest Reference Range & Units 11/27/24 08:19   Hemoglobin A1C 4.8 - 5.6 % 6.5 (H)   (H): Data is abnormally high    Stable on current meds.  No changes  Follow up 6 months

## 2024-12-24 DIAGNOSIS — E11.65 TYPE 2 DIABETES MELLITUS WITH HYPERGLYCEMIA, WITHOUT LONG-TERM CURRENT USE OF INSULIN (CMS/HCC): ICD-10-CM

## 2024-12-24 RX ORDER — METFORMIN HYDROCHLORIDE 500 MG/1
TABLET, EXTENDED RELEASE ORAL
Qty: 360 TABLET | Refills: 1 | Status: SHIPPED | OUTPATIENT
Start: 2024-12-24

## 2025-02-02 DIAGNOSIS — E11.9 TYPE 2 DIABETES MELLITUS WITHOUT COMPLICATION, WITHOUT LONG-TERM CURRENT USE OF INSULIN (CMS/HCC): ICD-10-CM

## 2025-02-03 RX ORDER — SEMAGLUTIDE 1.34 MG/ML
INJECTION, SOLUTION SUBCUTANEOUS
Qty: 3 ML | Refills: 2 | Status: SHIPPED | OUTPATIENT
Start: 2025-02-03

## 2025-02-03 NOTE — TELEPHONE ENCOUNTER
Medicine Refill Request    Last Office Visit: 12/3/2024   Last Consult Visit: 6/1/2023  Last Telemedicine Visit: 12/8/2022 Meghan Cordova CRNP    Next Appointment: 6/3/2025      Current Outpatient Medications:     blood sugar diagnostic (ACCU-CHEK GUIDE TEST STRIPS) strip, Test 1-2 times daily, Disp: 100 strip, Rfl: 1    blood-glucose meter (ACCU-CHEK GUIDE GLUCOSE METER) misc, Test bid, Disp: 1 each, Rfl: 0    cetirizine (ZyrTEC) 10 mg tablet, TAKE 1 TABLET BY MOUTH EVERY DAY, Disp: 90 tablet, Rfl: 1    empagliflozin (JARDIANCE) 10 mg tablet, Take 1 tablet (10 mg total) by mouth daily., Disp: 90 tablet, Rfl: 1    lancets (ACCU-CHEK FASTCLIX LANCET DRUM) misc, Test blood sugar one to two times daily, Disp: 100 each, Rfl: 1    metFORMIN XR (GLUCOPHAGE-XR) 500 mg 24 hr tablet, TAKE 2 TABLETS BY MOUTH TWICE A DAY, Disp: 360 tablet, Rfl: 1    minocycline (MINOCIN,DYNACIN) 100 mg capsule, TAKE ONE PILL DAILY WITH A FULL GLASS OF WATER. DO NOT LIE DOWN 1 HOUR AFTER TAKING., Disp: , Rfl:     semaglutide (OZEMPIC) 1 mg/dose (4 mg/3 mL) subcutaneous injection, Inject 1 mg under the skin every (seven) 7 days., Disp: 3 mL, Rfl: 2      BP Readings from Last 3 Encounters:   12/03/24 138/82   05/29/24 132/76   04/19/24 138/70       Recent Lab results:  Lab Results   Component Value Date    CHOL 191 05/25/2024   ,   Lab Results   Component Value Date    HDL 43 05/25/2024   ,   Lab Results   Component Value Date    LDLCALC 120 (H) 05/25/2024   ,   Lab Results   Component Value Date    TRIG 155 (H) 05/25/2024        Lab Results   Component Value Date    GLUCOSE 118 (H) 11/27/2024   ,   Lab Results   Component Value Date    HGBA1C 6.5 (H) 11/27/2024         Lab Results   Component Value Date    CREATININE 0.70 11/27/2024       Lab Results   Component Value Date    TSH 3.320 02/11/2023           Lab Results   Component Value Date    HGBA1C 6.5 (H) 11/27/2024

## 2025-02-05 DIAGNOSIS — L50.9 URTICARIA: ICD-10-CM

## 2025-02-05 RX ORDER — CETIRIZINE HYDROCHLORIDE 10 MG/1
10 TABLET ORAL DAILY
Qty: 90 TABLET | Refills: 1 | Status: SHIPPED | OUTPATIENT
Start: 2025-02-05

## 2025-02-05 NOTE — TELEPHONE ENCOUNTER
Medicine Refill Request    Last Office Visit: 12/3/2024   Last Consult Visit: 6/1/2023  Last Telemedicine Visit: 12/8/2022 Mehgan Cordova CRNP    Next Appointment: 6/3/2025      Current Outpatient Medications:     blood sugar diagnostic (ACCU-CHEK GUIDE TEST STRIPS) strip, Test 1-2 times daily, Disp: 100 strip, Rfl: 1    blood-glucose meter (ACCU-CHEK GUIDE GLUCOSE METER) misc, Test bid, Disp: 1 each, Rfl: 0    cetirizine (ZyrTEC) 10 mg tablet, TAKE 1 TABLET BY MOUTH EVERY DAY, Disp: 90 tablet, Rfl: 1    empagliflozin (JARDIANCE) 10 mg tablet, Take 1 tablet (10 mg total) by mouth daily., Disp: 90 tablet, Rfl: 1    lancets (ACCU-CHEK FASTCLIX LANCET DRUM) misc, Test blood sugar one to two times daily, Disp: 100 each, Rfl: 1    metFORMIN XR (GLUCOPHAGE-XR) 500 mg 24 hr tablet, TAKE 2 TABLETS BY MOUTH TWICE A DAY, Disp: 360 tablet, Rfl: 1    minocycline (MINOCIN,DYNACIN) 100 mg capsule, TAKE ONE PILL DAILY WITH A FULL GLASS OF WATER. DO NOT LIE DOWN 1 HOUR AFTER TAKING., Disp: , Rfl:     semaglutide (OZEMPIC) 1 mg/dose (4 mg/3 mL) subcutaneous injection, INJECT 1 MG SUBCUTANEOUSLY EVERY 7 DAYS, Disp: 3 mL, Rfl: 2      BP Readings from Last 3 Encounters:   12/03/24 138/82   05/29/24 132/76   04/19/24 138/70       Recent Lab results:  Lab Results   Component Value Date    CHOL 191 05/25/2024   ,   Lab Results   Component Value Date    HDL 43 05/25/2024   ,   Lab Results   Component Value Date    LDLCALC 120 (H) 05/25/2024   ,   Lab Results   Component Value Date    TRIG 155 (H) 05/25/2024        Lab Results   Component Value Date    GLUCOSE 118 (H) 11/27/2024   ,   Lab Results   Component Value Date    HGBA1C 6.5 (H) 11/27/2024         Lab Results   Component Value Date    CREATININE 0.70 11/27/2024       Lab Results   Component Value Date    TSH 3.320 02/11/2023           Lab Results   Component Value Date    HGBA1C 6.5 (H) 11/27/2024

## 2025-03-18 ENCOUNTER — OFFICE VISIT (OUTPATIENT)
Dept: PRIMARY CARE | Facility: CLINIC | Age: 65
End: 2025-03-18
Payer: COMMERCIAL

## 2025-03-18 VITALS
HEIGHT: 65 IN | DIASTOLIC BLOOD PRESSURE: 74 MMHG | BODY MASS INDEX: 43.6 KG/M2 | OXYGEN SATURATION: 97 % | TEMPERATURE: 97.5 F | SYSTOLIC BLOOD PRESSURE: 130 MMHG | HEART RATE: 78 BPM | RESPIRATION RATE: 17 BRPM

## 2025-03-18 DIAGNOSIS — E11.9 TYPE 2 DIABETES MELLITUS WITHOUT COMPLICATION, WITHOUT LONG-TERM CURRENT USE OF INSULIN (CMS/HCC): ICD-10-CM

## 2025-03-18 DIAGNOSIS — R22.1 LUMP IN NECK: Primary | ICD-10-CM

## 2025-03-18 DIAGNOSIS — I10 ESSENTIAL HYPERTENSION, BENIGN: ICD-10-CM

## 2025-03-18 DIAGNOSIS — C85.80 MARGINAL ZONE LYMPHOMA (CMS/HCC): ICD-10-CM

## 2025-03-18 DIAGNOSIS — E11.65 TYPE 2 DIABETES MELLITUS WITH HYPERGLYCEMIA, WITHOUT LONG-TERM CURRENT USE OF INSULIN (CMS/HCC): ICD-10-CM

## 2025-03-18 PROBLEM — H61.22 IMPACTED CERUMEN OF LEFT EAR: Status: RESOLVED | Noted: 2024-12-03 | Resolved: 2025-03-18

## 2025-03-18 PROCEDURE — 3008F BODY MASS INDEX DOCD: CPT | Performed by: NURSE PRACTITIONER

## 2025-03-18 PROCEDURE — 3075F SYST BP GE 130 - 139MM HG: CPT | Performed by: NURSE PRACTITIONER

## 2025-03-18 PROCEDURE — 3078F DIAST BP <80 MM HG: CPT | Performed by: NURSE PRACTITIONER

## 2025-03-18 PROCEDURE — 99214 OFFICE O/P EST MOD 30 MIN: CPT | Performed by: NURSE PRACTITIONER

## 2025-03-18 RX ORDER — LANCETS
EACH MISCELLANEOUS
Qty: 100 EACH | Refills: 1 | Status: SHIPPED | OUTPATIENT
Start: 2025-03-18

## 2025-03-18 RX ORDER — BLOOD SUGAR DIAGNOSTIC
STRIP MISCELLANEOUS
Qty: 100 STRIP | Refills: 1 | Status: SHIPPED | OUTPATIENT
Start: 2025-03-18

## 2025-03-18 RX ORDER — DEXTROSE 4 G
TABLET,CHEWABLE ORAL
Qty: 1 EACH | Refills: 0 | Status: SHIPPED | OUTPATIENT
Start: 2025-03-18

## 2025-03-18 ASSESSMENT — ENCOUNTER SYMPTOMS
DIAPHORESIS: 0
CHILLS: 0
NECK PAIN: 0
FEVER: 0

## 2025-03-18 ASSESSMENT — PAIN SCALES - GENERAL: PAINLEVEL_OUTOF10: 0-NO PAIN

## 2025-03-18 NOTE — PROGRESS NOTES
[facility] [address]  [place] [address]  [phone]  Fax [phone]      Patient ID: Anh Rowan                              : 1960    Visit Date: 3/18/2025    Chief Complaint: Neck Pain (Lump )         Patient ID: Anh Rowan is a 64 y.o. female.    Patient Active Problem List   Diagnosis    Essential hypertension, benign    Fatty liver    Morbid obesity with body mass index (BMI) of 45.0 to 49.9 in adult (CMS/HCC)    Marginal zone lymphoma (CMS/HCC)    Type 2 diabetes mellitus without complication, without long-term current use of insulin (CMS/HCC)    Venous insufficiency of both lower extremities    Lumbar disc disorder    Lumbar facet arthropathy    Hyperkalemia    Lump in neck         Current Outpatient Medications:     blood sugar diagnostic (ACCU-CHEK GUIDE TEST STRIPS) strip, Test 1-2 times daily, Disp: 100 strip, Rfl: 1    blood-glucose meter (ACCU-CHEK GUIDE GLUCOSE METER) misc, Test bid, Disp: 1 each, Rfl: 0    empagliflozin (JARDIANCE) 10 mg tablet, Take 1 tablet (10 mg total) by mouth daily., Disp: 90 tablet, Rfl: 1    lancets (ACCU-CHEK FASTCLIX LANCET DRUM) misc, Test blood sugar one to two times daily, Disp: 100 each, Rfl: 1    cetirizine (ZyrTEC) 10 mg tablet, Take 1 tablet (10 mg total) by mouth daily., Disp: 90 tablet, Rfl: 1    metFORMIN XR (GLUCOPHAGE-XR) 500 mg 24 hr tablet, TAKE 2 TABLETS BY MOUTH TWICE A DAY, Disp: 360 tablet, Rfl: 1    minocycline (MINOCIN,DYNACIN) 100 mg capsule, TAKE ONE PILL DAILY WITH A FULL GLASS OF WATER. DO NOT LIE DOWN 1 HOUR AFTER TAKING., Disp: , Rfl:     semaglutide (OZEMPIC) 1 mg/dose (4 mg/3 mL) subcutaneous injection, INJECT 1 MG SUBCUTANEOUSLY EVERY 7 DAYS, Disp: 3 mL, Rfl: 2    Allergies   Allergen Reactions    Clarithromycin Rash       Social History     Tobacco Use    Smoking status: Former    Smokeless tobacco: Never   Substance Use Topics    Alcohol use: Never  "   Drug use: Never       Health Maintenance   Topic Date Due    Colorectal Cancer Screening  Never done    COVID-19 Vaccine (1) Never done    Pneumococcal (50 years of age and older) (1 of 2 - PCV) Never done    Zoster Vaccine (1 of 2) 05/29/2025 (Originally 5/23/1980)    RSV Vaccine (1 - Risk 60-74 years 1-dose series) 05/29/2025 (Originally 5/23/2021)    Influenza Vaccine (1) 12/03/2025 (Originally 8/1/2024)    Diabetic Foot Exam  04/17/2025    Diabetes Kidney Health Evaluation:  uACR  05/25/2025    Breast Cancer Screening  07/05/2025    Annual Dilated Retinal Exam  11/26/2025    Diabetes Kidney Health Evaluation: eGFR  11/27/2025    Hemoglobin A1C  11/27/2025    Depression Screening  12/03/2025    Cervical Cancer Screening  11/19/2029    Hepatitis C Screening  Completed    Meningococcal ACWY  Aged Out    RSV <20 months  Aged Out    HIB Vaccines  Aged Out    Hepatitis B Vaccines  Aged Out    IPV Vaccines  Aged Out    Meningococcal B  Aged Out    HPV Vaccines  Aged Out    DTaP, Tdap, and Td Vaccines  Discontinued    HIV Screening  Discontinued       HPI  Lump R neck area  No pain  No rash/skin changes  Noticed in the past few weeks  No recent illness or trauma  No fever, chills  Her school nurse thought it was her muscle.  She notes a difference between her R and L side--they are not even.        The following have been reviewed and updated as appropriate in this visit:   Allergies  Meds  Problems         Review of System  Review of Systems   Constitutional:  Negative for chills, diaphoresis and fever.   HENT: Negative.     Musculoskeletal:  Negative for neck pain.       Objective     Vitals  Vitals:    03/18/25 1626   BP: 130/74   BP Location: Left upper arm   Patient Position: Sitting   Pulse: 78   Resp: 17   Temp: 36.4 °C (97.5 °F)   TempSrc: Temporal   SpO2: 97%   Height: 1.651 m (5' 5\")     Body mass index is 43.6 kg/m².      Physical Exam  Vitals reviewed.   Constitutional:       General: She is not in " acute distress.     Appearance: Normal appearance. She is not ill-appearing, toxic-appearing or diaphoretic.   Neck:      Thyroid: No thyroid mass, thyromegaly or thyroid tenderness.      Comments: Mass/prominence palpated posterior cervical region on R  Non tender  Cardiovascular:      Rate and Rhythm: Regular rhythm.   Pulmonary:      Effort: Pulmonary effort is normal. No respiratory distress.   Musculoskeletal:      Cervical back: No edema, erythema or rigidity. No pain with movement or muscular tenderness. Normal range of motion.   Neurological:      Mental Status: She is alert and oriented to person, place, and time.         Assessment/Plan     Problem List Items Addressed This Visit       Essential hypertension, benign    BP stable.         Marginal zone lymphoma (CMS/HCC)    Stable.  Oncology manages.         Type 2 diabetes mellitus without complication, without long-term current use of insulin (CMS/HCC)    Labs due end of May.  Ordered today.  Needs new glucometer with supplies.  Ordered.         Relevant Medications    lancets (ACCU-CHEK FASTCLIX LANCET DRUM) misc    blood-glucose meter (ACCU-CHEK GUIDE GLUCOSE METER) misc    blood sugar diagnostic (ACCU-CHEK GUIDE TEST STRIPS) strip    empagliflozin (JARDIANCE) 10 mg tablet    Other Relevant Orders    Comprehensive metabolic panel    Lipid panel    Hemoglobin A1c    Microalbumin/Creatinine Ur Random    Lump in neck - Primary    US neck ordered.         Relevant Orders    ULTRASOUND NECK     Other Visit Diagnoses         Type 2 diabetes mellitus with hyperglycemia, without long-term current use of insulin (CMS/HCC)        Relevant Medications    lancets (ACCU-CHEK FASTCLIX LANCET DRUM) misc    blood-glucose meter (ACCU-CHEK GUIDE GLUCOSE METER) misc    blood sugar diagnostic (ACCU-CHEK GUIDE TEST STRIPS) strip    empagliflozin (JARDIANCE) 10 mg tablet                HAYDER James  3/18/2025

## 2025-03-21 ENCOUNTER — APPOINTMENT (OUTPATIENT)
Dept: URBAN - METROPOLITAN AREA CLINIC 203 | Age: 65
Setting detail: DERMATOLOGY
End: 2025-03-21

## 2025-03-21 DIAGNOSIS — L71.8 OTHER ROSACEA: ICD-10-CM

## 2025-03-21 DIAGNOSIS — C85.8 OTHER SPECIFIED TYPES OF NON-HODGKIN LYMPHOMA: ICD-10-CM

## 2025-03-21 PROBLEM — C85.80 OTHER SPECIFIED TYPES OF NON-HODGKIN LYMPHOMA, UNSPECIFIED SITE: Status: ACTIVE | Noted: 2025-03-21

## 2025-03-21 PROCEDURE — 99213 OFFICE O/P EST LOW 20 MIN: CPT

## 2025-03-21 PROCEDURE — OTHER COUNSELING: OTHER

## 2025-03-21 PROCEDURE — OTHER PRESCRIPTION MEDICATION MANAGEMENT: OTHER

## 2025-03-21 ASSESSMENT — LOCATION ZONE DERM
LOCATION ZONE: SCALP
LOCATION ZONE: FACE

## 2025-03-21 ASSESSMENT — LOCATION SIMPLE DESCRIPTION DERM
LOCATION SIMPLE: RIGHT SCALP
LOCATION SIMPLE: LEFT CHEEK

## 2025-03-21 ASSESSMENT — LOCATION DETAILED DESCRIPTION DERM
LOCATION DETAILED: RIGHT CENTRAL FRONTAL SCALP
LOCATION DETAILED: LEFT INFERIOR CENTRAL MALAR CHEEK

## 2025-03-21 NOTE — PROCEDURE: PRESCRIPTION MEDICATION MANAGEMENT
Render In Strict Bullet Format?: No
Initiate Treatment: Rosacea triple cream compound from [place]
Detail Level: Zone
Plan: Patients scalp was examined by parting hair with qtips and examining skin with dermatoscope. \\n\\nPts scalp is clear at today’s visit.

## 2025-03-24 ENCOUNTER — TELEPHONE (OUTPATIENT)
Dept: PRIMARY CARE | Facility: CLINIC | Age: 65
End: 2025-03-24
Payer: COMMERCIAL

## 2025-03-24 ENCOUNTER — HOSPITAL ENCOUNTER (OUTPATIENT)
Dept: RADIOLOGY | Age: 65
Discharge: HOME | End: 2025-03-24
Attending: NURSE PRACTITIONER
Payer: COMMERCIAL

## 2025-03-24 DIAGNOSIS — R22.1 LUMP IN NECK: ICD-10-CM

## 2025-03-24 DIAGNOSIS — E11.9 TYPE 2 DIABETES MELLITUS WITHOUT COMPLICATION, WITHOUT LONG-TERM CURRENT USE OF INSULIN (CMS/HCC): Primary | ICD-10-CM

## 2025-03-24 PROCEDURE — 76536 US EXAM OF HEAD AND NECK: CPT

## 2025-03-24 RX ORDER — BLOOD-GLUCOSE SENSOR
EACH MISCELLANEOUS
Qty: 2 EACH | Refills: 5 | Status: SHIPPED | OUTPATIENT
Start: 2025-03-24 | End: 2025-04-21 | Stop reason: SDUPTHER

## 2025-03-24 RX ORDER — BLOOD-GLUCOSE,RECEIVER,CONT
EACH MISCELLANEOUS
Qty: 1 EACH | Refills: 0 | Status: SHIPPED | OUTPATIENT
Start: 2025-03-24

## 2025-03-24 NOTE — TELEPHONE ENCOUNTER
Rec'd a script last wk for a new diabetic monitor.    At Pharmacy it was identified the monitor was new model so her lancets and strips were incompatible.  Pharmacist directed her to not  & contact provider.     Pt asks if she can get a Dexacom 7?    Please review & advise.

## 2025-03-25 ENCOUNTER — RESULTS FOLLOW-UP (OUTPATIENT)
Dept: PRIMARY CARE | Facility: CLINIC | Age: 65
End: 2025-03-25

## 2025-04-21 ENCOUNTER — TELEPHONE (OUTPATIENT)
Dept: PRIMARY CARE | Facility: CLINIC | Age: 65
End: 2025-04-21
Payer: COMMERCIAL

## 2025-04-21 DIAGNOSIS — E11.9 TYPE 2 DIABETES MELLITUS WITHOUT COMPLICATION, WITHOUT LONG-TERM CURRENT USE OF INSULIN (CMS/HCC): ICD-10-CM

## 2025-04-21 RX ORDER — BLOOD-GLUCOSE SENSOR
EACH MISCELLANEOUS
Qty: 4 EACH | Refills: 6 | Status: SHIPPED | OUTPATIENT
Start: 2025-04-21

## 2025-04-21 NOTE — TELEPHONE ENCOUNTER
Refills have been requested for the following medications:      Other - Can i have an month at a time of Dexcom7      Preferred pharmacy: [id number] - [place] - [address] [place] [address] AT [facility]  Delivery method: Pickup        Medication renewals requested in this message routed separately:         blood-glucose sensor (DEXCOM G7 SENSOR) device device [Meghan Cordova]    Original script is for quantity of 2    Order pended for 4

## 2025-05-03 DIAGNOSIS — E11.9 TYPE 2 DIABETES MELLITUS WITHOUT COMPLICATION, WITHOUT LONG-TERM CURRENT USE OF INSULIN (CMS/HCC): ICD-10-CM

## 2025-05-05 RX ORDER — SEMAGLUTIDE 1.34 MG/ML
INJECTION, SOLUTION SUBCUTANEOUS
Qty: 3 ML | Refills: 2 | Status: SHIPPED | OUTPATIENT
Start: 2025-05-05

## 2025-06-08 LAB
ALBUMIN SERPL-MCNC: 4.7 G/DL (ref 3.9–4.9)
ALBUMIN/CREAT UR: 13 MG/G CREAT (ref 0–29)
ALP SERPL-CCNC: 83 IU/L (ref 44–121)
ALT SERPL-CCNC: 23 IU/L (ref 0–32)
AST SERPL-CCNC: 24 IU/L (ref 0–40)
BILIRUB SERPL-MCNC: 0.4 MG/DL (ref 0–1.2)
BUN SERPL-MCNC: 10 MG/DL (ref 8–27)
BUN/CREAT SERPL: 14 (ref 12–28)
CALCIUM SERPL-MCNC: 9.9 MG/DL (ref 8.7–10.3)
CHLORIDE SERPL-SCNC: 98 MMOL/L (ref 96–106)
CHOLEST SERPL-MCNC: 203 MG/DL (ref 100–199)
CO2 SERPL-SCNC: 25 MMOL/L (ref 20–29)
CREAT SERPL-MCNC: 0.72 MG/DL (ref 0.57–1)
CREAT UR-MCNC: 96.5 MG/DL
EGFRCR SERPLBLD CKD-EPI 2021: 93 ML/MIN/1.73
GLOBULIN SER CALC-MCNC: 2.6 G/DL (ref 1.5–4.5)
GLUCOSE SERPL-MCNC: 116 MG/DL (ref 70–99)
HBA1C MFR BLD: 5.9 % (ref 4.8–5.6)
HDLC SERPL-MCNC: 45 MG/DL
LDLC SERPL CALC-MCNC: 131 MG/DL (ref 0–99)
MICROALBUMIN UR-MCNC: 12.3 UG/ML
POTASSIUM SERPL-SCNC: 5.1 MMOL/L (ref 3.5–5.2)
PROT SERPL-MCNC: 7.3 G/DL (ref 6–8.5)
SODIUM SERPL-SCNC: 140 MMOL/L (ref 134–144)
TRIGL SERPL-MCNC: 150 MG/DL (ref 0–149)
VLDLC SERPL CALC-MCNC: 27 MG/DL (ref 5–40)

## 2025-06-09 ENCOUNTER — RESULTS FOLLOW-UP (OUTPATIENT)
Dept: PRIMARY CARE | Facility: CLINIC | Age: 65
End: 2025-06-09

## 2025-06-13 PROBLEM — E78.00 HYPERCHOLESTEROLEMIA: Status: ACTIVE | Noted: 2025-06-13

## 2025-06-13 PROBLEM — R22.1 LUMP IN NECK: Status: RESOLVED | Noted: 2025-03-18 | Resolved: 2025-06-13

## 2025-06-13 PROBLEM — E87.5 HYPERKALEMIA: Status: RESOLVED | Noted: 2024-12-03 | Resolved: 2025-06-13

## 2025-06-18 ENCOUNTER — OFFICE VISIT (OUTPATIENT)
Dept: PRIMARY CARE | Facility: CLINIC | Age: 65
End: 2025-06-18
Payer: COMMERCIAL

## 2025-06-18 VITALS
HEIGHT: 65 IN | BODY MASS INDEX: 43.6 KG/M2 | TEMPERATURE: 97.2 F | DIASTOLIC BLOOD PRESSURE: 88 MMHG | SYSTOLIC BLOOD PRESSURE: 130 MMHG | OXYGEN SATURATION: 99 % | HEART RATE: 106 BPM

## 2025-06-18 DIAGNOSIS — I10 ESSENTIAL HYPERTENSION, BENIGN: ICD-10-CM

## 2025-06-18 DIAGNOSIS — E11.9 TYPE 2 DIABETES MELLITUS WITHOUT COMPLICATION, WITHOUT LONG-TERM CURRENT USE OF INSULIN (CMS/HCC): Primary | ICD-10-CM

## 2025-06-18 DIAGNOSIS — Z12.31 BREAST CANCER SCREENING BY MAMMOGRAM: ICD-10-CM

## 2025-06-18 DIAGNOSIS — E78.00 HYPERCHOLESTEROLEMIA: ICD-10-CM

## 2025-06-18 DIAGNOSIS — E66.01 MORBID OBESITY WITH BODY MASS INDEX (BMI) OF 45.0 TO 49.9 IN ADULT (CMS/HCC): ICD-10-CM

## 2025-06-18 DIAGNOSIS — C85.80 MARGINAL ZONE LYMPHOMA (CMS/HCC): ICD-10-CM

## 2025-06-18 DIAGNOSIS — Z12.11 COLON CANCER SCREENING: ICD-10-CM

## 2025-06-18 PROCEDURE — 3008F BODY MASS INDEX DOCD: CPT | Performed by: NURSE PRACTITIONER

## 2025-06-18 PROCEDURE — 3079F DIAST BP 80-89 MM HG: CPT | Performed by: NURSE PRACTITIONER

## 2025-06-18 PROCEDURE — 3075F SYST BP GE 130 - 139MM HG: CPT | Performed by: NURSE PRACTITIONER

## 2025-06-18 PROCEDURE — 99214 OFFICE O/P EST MOD 30 MIN: CPT | Performed by: NURSE PRACTITIONER

## 2025-06-18 RX ORDER — ROSUVASTATIN CALCIUM 5 MG/1
5 TABLET, COATED ORAL DAILY
Qty: 90 TABLET | Refills: 1 | Status: SHIPPED | OUTPATIENT
Start: 2025-06-18 | End: 2025-12-15

## 2025-06-18 ASSESSMENT — ENCOUNTER SYMPTOMS
MYALGIAS: 0
SHORTNESS OF BREATH: 0
DIABETIC ASSOCIATED SYMPTOMS: 0

## 2025-06-18 ASSESSMENT — PAIN SCALES - GENERAL: PAINLEVEL_OUTOF10: 0-NO PAIN

## 2025-06-18 NOTE — ASSESSMENT & PLAN NOTE
Latest Reference Range & Units 06/07/25 07:13   Sodium 134 - 144 mmol/L 140   Potassium, Bld 3.5 - 5.2 mmol/L 5.1   Chloride 96 - 106 mmol/L 98   CO2 20 - 29 mmol/L 25   BUN 8 - 27 mg/dL 10   Creatinine 0.57 - 1.00 mg/dL 0.72   Glucose 70 - 99 mg/dL 116 (H)   Calcium 8.7 - 10.3 mg/dL 9.9   AST (SGOT) 0 - 40 IU/L 24   ALT (SGPT) 0 - 32 IU/L 23   Alkaline Phosphatase 44 - 121 IU/L 83   Total Protein 6.0 - 8.5 g/dL 7.3   Albumin 3.9 - 4.9 g/dL 4.7   Bilirubin, Total 0.0 - 1.2 mg/dL 0.4   eGFR >59 mL/min/1.73 93   Bun/Creatinine Ratio 12 - 28  14   Globulin 1.5 - 4.5 g/dL 2.6   Cholesterol 100 - 199 mg/dL 203 (H)   Triglycerides 0 - 149 mg/dL 150 (H)   HDL >39 mg/dL 45   LDL Calculated 0 - 99 mg/dL 131 (H)   (H): Data is abnormally high    Start Rosuvastatin QD  Repeat  labs 6 mo

## 2025-06-18 NOTE — PROGRESS NOTES
[facility] at [address]  [place] [address]  [phone]  Fax [phone]      Patient ID: Anh Rowan                              : 1960    Visit Date: 2025    Chief Complaint: Follow-up         Patient ID: Anh Rowan is a 65 y.o. female.    Patient Active Problem List   Diagnosis    Essential hypertension, benign    Fatty liver    Morbid obesity with body mass index (BMI) of 45.0 to 49.9 in adult (CMS/HCC)    Marginal zone lymphoma (CMS/HCC)    Type 2 diabetes mellitus without complication, without long-term current use of insulin (CMS/HCC)    Venous insufficiency of both lower extremities    Lumbar disc disorder    Lumbar facet arthropathy    Hypercholesterolemia         Current Outpatient Medications:     blood sugar diagnostic (ACCU-CHEK GUIDE TEST STRIPS) strip, Test 1-2 times daily, Disp: 100 strip, Rfl: 1    blood-glucose meter (ACCU-CHEK GUIDE GLUCOSE METER) misc, Test bid, Disp: 1 each, Rfl: 0    blood-glucose meter,continuous (DEXCOM G7 ) misc , To monitor blood glucose PRN, Disp: 1 each, Rfl: 0    blood-glucose sensor (DEXCOM G7 SENSOR) device device, Use one new sensor every 10 days to monitor blood sugar, Disp: 4 each, Rfl: 6    cetirizine (ZyrTEC) 10 mg tablet, Take 1 tablet (10 mg total) by mouth daily., Disp: 90 tablet, Rfl: 1    empagliflozin (JARDIANCE) 10 mg tablet, Take 1 tablet (10 mg total) by mouth daily., Disp: 90 tablet, Rfl: 1    lancets (ACCU-CHEK FASTCLIX LANCET DRUM) misc, Test blood sugar one to two times daily, Disp: 100 each, Rfl: 1    metFORMIN XR (GLUCOPHAGE-XR) 500 mg 24 hr tablet, TAKE 2 TABLETS BY MOUTH TWICE A DAY, Disp: 360 tablet, Rfl: 1    minocycline (MINOCIN,DYNACIN) 100 mg capsule, TAKE ONE PILL DAILY WITH A FULL GLASS OF WATER. DO NOT LIE DOWN 1 HOUR AFTER TAKING., Disp: , Rfl:     rosuvastatin (CRESTOR) 5 mg tablet, Take 1 tablet (5 mg total) by mouth daily.,  Disp: 90 tablet, Rfl: 1    semaglutide (OZEMPIC) 1 mg/dose (4 mg/3 mL) subcutaneous injection, INJECT 1 MG SUBCUTANEOUSLY EVERY 7 DAYS, Disp: 3 mL, Rfl: 2    Allergies   Allergen Reactions    Clarithromycin Rash       Social History     Tobacco Use    Smoking status: Former    Smokeless tobacco: Never   Substance Use Topics    Alcohol use: Never    Drug use: Never       Health Maintenance   Topic Date Due    Colorectal Cancer Screening  Never done    Diabetic Foot Exam  04/17/2025    Influenza Vaccine (Season Ended) 12/03/2025 (Originally 8/1/2025)    RSV Vaccine (1 - Risk 60-74 years 1-dose series) 06/13/2026 (Originally 5/23/2021)    COVID-19 Vaccine (1) 06/13/2026 (Originally 5/23/1966)    Zoster Vaccine (1 of 2) 06/18/2026 (Originally 5/23/1980)    Pneumococcal (50 years of age and older) (1 of 2 - PCV) 06/18/2026 (Originally 5/23/1980)    Breast Cancer Screening  07/05/2025    Annual Dilated Retinal Exam  11/26/2025    Depression Screening  12/03/2025    Diabetes Kidney Health Evaluation: eGFR  06/07/2026    Diabetes Kidney Health Evaluation:  uACR  06/07/2026    Hemoglobin A1C  06/07/2026    Cervical Cancer Screening  11/19/2029    Hepatitis C Screening  Completed    Meningococcal ACWY  Aged Out    RSV <20 months  Aged Out    HIB Vaccines  Aged Out    Hepatitis B Vaccines  Aged Out    IPV Vaccines  Aged Out    Meningococcal B  Aged Out    HPV Vaccines  Aged Out    DTaP, Tdap, and Td Vaccines  Discontinued    HIV Screening  Discontinued       HPI  Routine med check    Diabetes  She presents for her follow-up diabetic visit. She has type 2 diabetes mellitus. Her disease course has been stable. There are no diabetic associated symptoms. Pertinent negatives for diabetes include no chest pain. There are no diabetic complications. Current diabetic treatment includes oral agent (monotherapy) (Ozempic). She is compliant with treatment all of the time. Her weight is decreasing steadily. She is following a generally  "healthy diet. Meal planning includes avoidance of concentrated sweets. She participates in exercise intermittently. An ACE inhibitor/angiotensin II receptor blocker is not being taken. She sees a podiatrist.Eye exam is current.   Hyperlipidemia  This is a chronic problem. The current episode started more than 1 year ago. The problem is uncontrolled. Recent lipid tests were reviewed and are high. Exacerbating diseases include diabetes and obesity. Pertinent negatives include no chest pain, myalgias or shortness of breath. She is currently on no antihyperlipidemic treatment. There are no compliance problems.        The following have been reviewed and updated as appropriate in this visit:   Allergies  Meds  Problems         Review of System  Review of Systems   Respiratory:  Negative for shortness of breath.    Cardiovascular:  Negative for chest pain.   Musculoskeletal:  Negative for myalgias.       Objective     Vitals  Vitals:    06/18/25 0912   BP: 130/88   BP Location: Left upper arm   Patient Position: Sitting   Pulse: (!) 106   Temp: 36.2 °C (97.2 °F)   TempSrc: Temporal   SpO2: 99%   Height: 1.651 m (5' 5\")     Body mass index is 43.6 kg/m².    Physical Exam  Vitals reviewed.   Constitutional:       General: She is not in acute distress.     Appearance: Normal appearance. She is not ill-appearing, toxic-appearing or diaphoretic.   Cardiovascular:      Rate and Rhythm: Normal rate and regular rhythm.      Heart sounds: No murmur heard.     No friction rub. No gallop.   Pulmonary:      Effort: Pulmonary effort is normal.      Breath sounds: Normal breath sounds. No wheezing, rhonchi or rales.   Musculoskeletal:      Right lower leg: No edema.      Left lower leg: No edema.   Neurological:      Mental Status: She is alert and oriented to person, place, and time.         Assessment/Plan     Problem List Items Addressed This Visit       Essential hypertension, benign    BP stable.  Continue to monitor.  Follow " up 6 months.         Morbid obesity with body mass index (BMI) of 45.0 to 49.9 in adult (CMS/[facility])    Is down 50+ lbs  Continue weight loss efforts.         Marginal zone lymphoma (CMS/[facility])    Oncology managing  Stable.         Type 2 diabetes mellitus without complication, without long-term current use of insulin (CMS/[facility]) - Primary     Latest Reference Range & Units 06/07/25 07:13   Hemoglobin A1C 4.8 - 5.6 % 5.9 (H)   (H): Data is abnormally high   Latest Reference Range & Units 06/07/25 07:13   Creatinine, Urine Not Estab. mg/dL 96.5   Microalb/Creat Ratio 0 - 29 mg/g creat 13   Microalbumin, Ur Not Estab. ug/mL 12.3     Stable  Same meds  Follow up 6 months  Repeat labs 6 mo         Relevant Orders    Comprehensive metabolic panel    Hemoglobin A1c    Hypercholesterolemia     Latest Reference Range & Units 06/07/25 07:13   Sodium 134 - 144 mmol/L 140   Potassium, Bld 3.5 - 5.2 mmol/L 5.1   Chloride 96 - 106 mmol/L 98   CO2 20 - 29 mmol/L 25   BUN 8 - 27 mg/dL 10   Creatinine 0.57 - 1.00 mg/dL 0.72   Glucose 70 - 99 mg/dL 116 (H)   Calcium 8.7 - 10.3 mg/dL 9.9   AST (SGOT) 0 - 40 IU/L 24   ALT (SGPT) 0 - 32 IU/L 23   Alkaline Phosphatase 44 - 121 IU/L 83   Total Protein 6.0 - 8.5 g/dL 7.3   Albumin 3.9 - 4.9 g/dL 4.7   Bilirubin, Total 0.0 - 1.2 mg/dL 0.4   eGFR >59 mL/min/1.73 93   Bun/Creatinine Ratio 12 - 28  14   Globulin 1.5 - 4.5 g/dL 2.6   Cholesterol 100 - 199 mg/dL 203 (H)   Triglycerides 0 - 149 mg/dL 150 (H)   HDL >39 mg/dL 45   LDL Calculated 0 - 99 mg/dL 131 (H)   (H): Data is abnormally high    Start Rosuvastatin QD  Repeat  labs 6 mo         Relevant Medications    rosuvastatin (CRESTOR) 5 mg tablet    Other Relevant Orders    Comprehensive metabolic panel    Lipid panel     Other Visit Diagnoses         Colon cancer screening        Relevant Orders    FIT Test      Breast cancer screening by mammogram        Relevant Orders    BI SCREENING MAMMOGRAM BILATERAL(TOMOSYNTHESIS)                Meghan  HAYDER Cordova  6/18/2025

## 2025-06-18 NOTE — ASSESSMENT & PLAN NOTE
Latest Reference Range & Units 06/07/25 07:13   Hemoglobin A1C 4.8 - 5.6 % 5.9 (H)   (H): Data is abnormally high   Latest Reference Range & Units 06/07/25 07:13   Creatinine, Urine Not Estab. mg/dL 96.5   Microalb/Creat Ratio 0 - 29 mg/g creat 13   Microalbumin, Ur Not Estab. ug/mL 12.3     Stable  Same meds  Follow up 6 months  Repeat labs 6 mo

## 2025-07-02 DIAGNOSIS — E11.65 TYPE 2 DIABETES MELLITUS WITH HYPERGLYCEMIA, WITHOUT LONG-TERM CURRENT USE OF INSULIN (CMS/HCC): ICD-10-CM

## 2025-07-02 RX ORDER — METFORMIN HYDROCHLORIDE 500 MG/1
1000 TABLET, EXTENDED RELEASE ORAL 2 TIMES DAILY
Qty: 360 TABLET | Refills: 1 | Status: SHIPPED | OUTPATIENT
Start: 2025-07-02

## 2025-07-02 NOTE — TELEPHONE ENCOUNTER
Medicine Refill Request    Last Office Visit: 6/18/2025   Last Consult Visit: 6/1/2023  Last Telemedicine Visit: 12/8/2022 Meghan Cordova CRNP    Next Appointment: 12/16/2025      Current Outpatient Medications:     blood sugar diagnostic (ACCU-CHEK GUIDE TEST STRIPS) strip, Test 1-2 times daily, Disp: 100 strip, Rfl: 1    blood-glucose meter (ACCU-CHEK GUIDE GLUCOSE METER) misc, Test bid, Disp: 1 each, Rfl: 0    blood-glucose meter,continuous (DEXCOM G7 ) misc , To monitor blood glucose PRN, Disp: 1 each, Rfl: 0    blood-glucose sensor (DEXCOM G7 SENSOR) device device, Use one new sensor every 10 days to monitor blood sugar, Disp: 4 each, Rfl: 6    cetirizine (ZyrTEC) 10 mg tablet, Take 1 tablet (10 mg total) by mouth daily., Disp: 90 tablet, Rfl: 1    empagliflozin (JARDIANCE) 10 mg tablet, Take 1 tablet (10 mg total) by mouth daily., Disp: 90 tablet, Rfl: 1    lancets (ACCU-CHEK FASTCLIX LANCET DRUM) misc, Test blood sugar one to two times daily, Disp: 100 each, Rfl: 1    metFORMIN XR (GLUCOPHAGE-XR) 500 mg 24 hr tablet, TAKE 2 TABLETS BY MOUTH TWICE A DAY, Disp: 360 tablet, Rfl: 1    minocycline (MINOCIN,DYNACIN) 100 mg capsule, TAKE ONE PILL DAILY WITH A FULL GLASS OF WATER. DO NOT LIE DOWN 1 HOUR AFTER TAKING., Disp: , Rfl:     rosuvastatin (CRESTOR) 5 mg tablet, Take 1 tablet (5 mg total) by mouth daily., Disp: 90 tablet, Rfl: 1    semaglutide (OZEMPIC) 2 mg/dose (8 mg/3 mL) subcutaneous injection, Inject 2 mg under the skin every (seven) 7 days., Disp: 3 mL, Rfl: 2    BP Readings from Last 3 Encounters:   06/18/25 130/88   03/18/25 130/74   12/03/24 138/82       Recent Lab results:  Lab Results   Component Value Date    CHOL 203 (H) 06/07/2025   ,   Lab Results   Component Value Date    HDL 45 06/07/2025   ,   Lab Results   Component Value Date    LDLCALC 131 (H) 06/07/2025   ,   Lab Results   Component Value Date    TRIG 150 (H) 06/07/2025        Lab Results   Component Value Date     GLUCOSE 116 (H) 06/07/2025   ,   Lab Results   Component Value Date    HGBA1C 5.9 (H) 06/07/2025         Lab Results   Component Value Date    CREATININE 0.72 06/07/2025       Lab Results   Component Value Date    TSH 3.320 02/11/2023           Lab Results   Component Value Date    HGBA1C 5.9 (H) 06/07/2025

## 2025-08-02 DIAGNOSIS — L50.9 URTICARIA: ICD-10-CM

## 2025-08-04 RX ORDER — CETIRIZINE HYDROCHLORIDE 10 MG/1
10 TABLET ORAL DAILY
Qty: 90 TABLET | Refills: 1 | Status: SHIPPED | OUTPATIENT
Start: 2025-08-04

## 2025-09-04 DIAGNOSIS — E11.9 TYPE 2 DIABETES MELLITUS WITHOUT COMPLICATION, WITHOUT LONG-TERM CURRENT USE OF INSULIN (CMS/HCC): ICD-10-CM

## 2025-09-04 RX ORDER — EMPAGLIFLOZIN 10 MG/1
10 TABLET, FILM COATED ORAL DAILY
Qty: 90 TABLET | Refills: 1 | Status: SHIPPED | OUTPATIENT
Start: 2025-09-04